# Patient Record
Sex: FEMALE | Race: WHITE
[De-identification: names, ages, dates, MRNs, and addresses within clinical notes are randomized per-mention and may not be internally consistent; named-entity substitution may affect disease eponyms.]

---

## 2018-12-19 ENCOUNTER — HOSPITAL ENCOUNTER (EMERGENCY)
Dept: HOSPITAL 97 - ER | Age: 48
Discharge: HOME | End: 2018-12-19
Payer: COMMERCIAL

## 2018-12-19 DIAGNOSIS — I10: ICD-10-CM

## 2018-12-19 DIAGNOSIS — J18.9: Primary | ICD-10-CM

## 2018-12-19 PROCEDURE — 99283 EMERGENCY DEPT VISIT LOW MDM: CPT

## 2018-12-19 PROCEDURE — 87081 CULTURE SCREEN ONLY: CPT

## 2018-12-19 PROCEDURE — 87804 INFLUENZA ASSAY W/OPTIC: CPT

## 2018-12-19 PROCEDURE — 71046 X-RAY EXAM CHEST 2 VIEWS: CPT

## 2018-12-19 PROCEDURE — 87070 CULTURE OTHR SPECIMN AEROBIC: CPT

## 2018-12-19 NOTE — ER
Nurse's Notes                                                                                     

 [facility]                                                                

Name: Maricel Varner                                                                              

Age: 48 yrs                                                                                       

Sex: Female                                                                                       

: 1970                                                                                   

MRN: [id number]                                                                                   

Arrival Date: 2018                                                                          

Time: 18:18                                                                                       

Account#: [id number]                                                                            

Bed DIS1                                                                                          

Private MD: MISTI ROONEY                                                                          

Diagnosis: Pneumonia                                                                              

                                                                                                  

Presentation:                                                                                     

                                                                                             

18:38 Presenting complaint: Patient states: Cough, sore throat, sore ribs, chills and body    ph  

      aches x 2-3 days, denies N/V/D. Transition of care: patient was not received from           

      another setting of care. Onset of symptoms was 2018. Risk Assessment: Do       

      you want to hurt yourself or someone else? Patient reports no desire to harm self or        

      others. Initial Sepsis Screen: Does the patient meet any 2 criteria? No. Patient's          

      initial sepsis screen is negative. Does the patient have a suspected source of              

      infection? No. Patient's initial sepsis screen is negative. Care prior to arrival: None.    

18:38 Method Of Arrival: Ambulatory                                                           ph  

18:38 Acuity: NAY 4                                                                           ph  

                                                                                                  

Historical:                                                                                       

- Allergies:                                                                                      

18:43 No Known Allergies;                                                                     ph  

- PMHx:                                                                                           

18:43 Hepatitis; Hypertension;                                                                ph  

- PSHx:                                                                                           

18:43 Tonsillectomy; ;                                                               ph  

                                                                                                  

- Immunization history:: Adult Immunizations up to date.                                          

- Social history:: Smoking status: Patient/guardian denies using tobacco.                         

- Ebola Screening: : No symptoms or risks identified at this time.                                

                                                                                                  

                                                                                                  

Screenin:56 Abuse screen: Denies threats or abuse. Nutritional screening: No deficits noted.        la1 

      Tuberculosis screening: No symptoms or risk factors identified. Fall Risk None              

      identified.                                                                                 

                                                                                                  

Assessment:                                                                                       

18:55 General: Appears in no apparent distress. Behavior is calm, cooperative. Pain: Denies   la1 

      pain. Neuro: Level of Consciousness is awake, alert, obeys commands, Oriented to            

      person, place, time, situation. Cardiovascular: Capillary refill < 3 seconds Patient's      

      skin is warm and dry. Respiratory: Airway is patent Trachea midline Respiratory effort      

      is even, unlabored, Respiratory pattern is regular, symmetrical, Breath sounds are          

      clear bilaterally. Respiratory: Reports cough that is non-productive, persistent. GI:       

      No signs and/or symptoms were reported involving the gastrointestinal system. : No        

      signs and/or symptoms were reported regarding the genitourinary system.                     

                                                                                                  

Vital Signs:                                                                                      

18:41  / 95; Pulse 94; Resp 18; Temp 98.9(O); Pulse Ox 98% on R/A; Weight 90.72 kg;     ph  

      Height 5 ft. 7 in. (170.18 cm); Pain 10/10;                                                 

18:41 Body Mass Index 31.32 (90.72 kg, 170.18 cm)                                             ph  

                                                                                                  

ED Course:                                                                                        

18:18 Patient arrived in ED.                                                                  sb2 

18:19 MISTI ROONEY is Private Physician.                                                      sb2 

18:23 Jean-Pierre Soares PA is PHCP.                                                              jmm 

18:23 José Brar MD is Attending Physician.                                              m 

18:33 Ronald Pandya, RN is Primary Nurse.                                                       la1 

18:41 Triage completed.                                                                       ph  

18:43 Arm band placed on.                                                                     ph  

18:56 Call light in reach.                                                                    la1 

18:56 No provider procedures requiring assistance completed. Patient did not have IV access   la1 

      during this emergency room visit.                                                           

19:08 Chest Pa And Lat (2 Views) XRAY In Process Unspecified.                                 EDMS

19:43 MISTI ROONEY is Referral Physician.                                                     [facility] 

                                                                                                  

Administered Medications:                                                                         

No medications were administered                                                                  

                                                                                                  

                                                                                                  

Outcome:                                                                                          

19:43 Discharge ordered by MD.                                                                m 

19:57 Discharged to home ambulatory.                                                          la1 

19:57 Condition: stable                                                                           

19:57 Discharge instructions given to patient, Instructed on discharge instructions, follow       

      up and referral plans. medication usage, Demonstrated understanding of instructions,        

      follow-up care, medications, Prescriptions given X 1.                                       

19:57 Patient left the ED.                                                                    la1 

                                                                                                  

Signatures:                                                                                       

Dispatcher [employer]                           EDMS                                                 

Jean-Pierre Soares PA PA   Ronald Yang, RN                         RN   la1                                                  

Katlin Mitchell RN                      RN                                                      

Larissa Whitley                               sb2                                                  

                                                                                                  

**************************************************************************************************

## 2018-12-19 NOTE — EDPHYS
Physician Documentation                                                                           

 [facility]                                                                

Name: Maricel Varner                                                                              

Age: 48 yrs                                                                                       

Sex: Female                                                                                       

: 1970                                                                                   

MRN: [id number]                                                                                   

Arrival Date: 2018                                                                          

Time: 18:18                                                                                       

Account#: [id number]                                                                            

Bed DIS1                                                                                          

Private MD: MISTI ROONEY                                                                          

ED Physician José Brar                                                                       

HPI:                                                                                              

                                                                                             

18:47 This 48 yrs old  Female presents to ER via Ambulatory with complaints of Flu   jmm 

      Symptoms.                                                                                   

18:47 The patient or guardian reports cough. Onset: The symptoms/episode began/occurred       jmm 

      gradually, 2 day(s) ago. Modifying factors: The symptoms are alleviated by nothing. the     

      symptoms are aggravated by nothing. Associated signs and symptoms: Pertinent positives:     

      sore throat, Pertinent negatives:. This is a 48 year old female with a history of HTN       

      that presents to the ED with cough, congestion, sore throat beginning this past Monday.     

      Family members have similar symptoms. .                                                     

                                                                                                  

Historical:                                                                                       

- Allergies:                                                                                      

18:43 No Known Allergies;                                                                     ph  

- PMHx:                                                                                           

18:43 Hepatitis; Hypertension;                                                                ph  

- PSHx:                                                                                           

18:43 Tonsillectomy; ;                                                               ph  

                                                                                                  

- Immunization history:: Adult Immunizations up to date.                                          

- Social history:: Smoking status: Patient/guardian denies using tobacco.                         

- Ebola Screening: : No symptoms or risks identified at this time.                                

                                                                                                  

                                                                                                  

ROS:                                                                                              

18:47 Eyes: Negative for injury, pain, redness, and discharge.                                jmm 

18:47 Neck: Negative for injury, pain, and swelling, Cardiovascular: Negative for chest pain,     

      palpitations, and edema.                                                                    

18:47 Constitutional: Positive for chills.                                                        

18:47 ENT: Positive for sore throat.                                                              

18:47 Respiratory: Positive for cough.                                                            

18:47 All other systems are negative.                                                             

                                                                                                  

Exam:                                                                                             

18:47 Constitutional:  This is a well developed, well nourished patient who is awake, alert,  jmm 

      and in no acute distress. Head/Face:  atraumatic. Eyes:  EOMI, no conjunctival erythema     

      appreciated ENT:  Moist Mucus Membranes Neck:  Trachea midline, Supple Chest/axilla:        

      Normal chest wall appearance and motion.                                                    

18:47 Cardiovascular: Rate: normal, Rhythm: regular.                                              

18:47 Respiratory: the patient does not display signs of respiratory distress,  Respirations:     

      normal, Breath sounds: are clear throughout.                                                

18:47 Abdomen/GI: Inspection: abdomen appears normal, Bowel sounds: normal, Palpation: soft,      

      nontender, in all quadrants.                                                                

18:47 Back: ROM is normal.                                                                        

18:47 Musculoskeletal/extremity: ROM: intact in all extremities.                                  

18:47 Skin: Appearance: Color: normal in color.                                                   

18:47 Neuro: Orientation: is normal, Mentation: is normal, Memory: is normal.                     

18:47 Psych: Behavior/mood is pleasant, cooperative.                                              

                                                                                                  

Vital Signs:                                                                                      

18:41  / 95; Pulse 94; Resp 18; Temp 98.9(O); Pulse Ox 98% on R/A; Weight 90.72 kg;     ph  

      Height 5 ft. 7 in. (170.18 cm); Pain 10/10;                                                 

18:41 Body Mass Index 31.32 (90.72 kg, 170.18 cm)                                             ph  

                                                                                                  

MDM:                                                                                              

18:43 Patient medically screened.                                                             [facility] 

19:42 Data reviewed: vital signs, nurses notes. Counseling: I had a detailed discussion with  [facility] 

      the patient and/or guardian regarding: the historical points, exam findings, and any        

      diagnostic results supporting the discharge/admit diagnosis, lab results, radiology         

      results, the need for outpatient follow up, to return to the emergency department if        

      symptoms worsen or persist or if there are any questions or concerns that arise at          

      home. ED course: Patient is alert and non toxic in appearance in the ED. Patient            

      advised to follow up with PCP in 1 to 2 days for reevaluation. Patient has no signs of      

      resp distress. Patient given strict return precautions. .                                   

                                                                                                  

                                                                                             

18:44 Order name: Influenza Screen (a \T\ B); Complete Time: 19:28                              [facility]

                                                                                             

18:44 Order name: Strep; Complete Time: 19:28                                                 [facility] 

                                                                                             

18:44 Order name: Chest Pa And Lat (2 Views) XRAY; Complete Time: 19:21                       [facility] 

                                                                                             

19:25 Order name: Throat Culture                                                              EDMS

                                                                                                  

Administered Medications:                                                                         

No medications were administered                                                                  

                                                                                                  

                                                                                                  

Disposition:                                                                                      

                                                                                             

07:40 Co-signature as Attending Physician, José Brar MD I agree with the assessment and   kdr 

      plan of care.                                                                               

                                                                                                  

Disposition:                                                                                      

18 19:43 Discharged to Home. Impression: Pneumonia.                                         

- Condition is Stable.                                                                            

- Discharge Instructions: Community-Acquired Pneumonia, Adult.                                    

- Prescriptions for Levaquin 750 mg Oral Tablet - take 1 tablet by ORAL route once                

  daily for 10 days; 10 tablet.                                                                   

- Medication Reconciliation Form, Thank You Letter, Antibiotic Education, Prescription            

  Opioid Use form.                                                                                

- Follow up: MISTI ROONEY; When: 1 - 2 days; Reason: Recheck today's complaints,                  

  Continuance of care, Re-evaluation by your physician.                                           

                                                                                                  

                                                                                                  

                                                                                                  

Signatures:                                                                                       

Dispatcher [employer]                           EDMS                                                 

José Brar MD MD kdr Mickail, Joel, PA PA jmm Attema, Lee, RN                         RN   la1                                                  

Katlin Mitchell RN                      RN   ph                                                   

                                                                                                  

Corrections: (The following items were deleted from the chart)                                    

                                                                                             

19:57 19:43 2018 19:43 Discharged to Home. Impression: Pneumonia. Condition is Stable.  la1 

      Forms are Medication Reconciliation Form, Thank You Letter, Antibiotic Education,           

      Prescription Opioid Use. Follow up: MISTI ROONEY; When: 1 - 2 days; Reason: Recheck          

      today's complaints, Continuance of care, Re-evaluation by your physician. rafi               

                                                                                                  

**************************************************************************************************

## 2018-12-19 NOTE — RAD REPORT
EXAM DESCRIPTION:  RAD - Chest Pa And Lat (2 Views) - 12/19/2018 7:08 pm

 

CLINICAL HISTORY:  cough, fever

Chest pain.

 

COMPARISON:  No comparisons

 

FINDINGS:  Mild linear opacities are seen in the lingula, suspicious for developing pneumonia. The tito
ngs are otherwise clear. The heart is normal in size. No displaced fractures.

 

IMPRESSION:  Early developing lingular pneumonia is suspected.

## 2018-12-21 NOTE — XMS REPORT
Patient Summary Document

 Created on:2018



Patient:WARREN PAGE

Sex:Female

:1970

External Reference #:[id number]





Demographics







 Address  [address] [place] [address]

 

 Home Phone  [phone]

 

 Work Phone  [phone] CE[employer]

 

 Email Address  [email or web address]

 

 Preferred Language  Unknown

 

 Marital Status  Unknown

 

 [employer] Affiliation  Unknown

 

 Race  Unknown

 

 Additional Race(s)  Unavailable

 

 Ethnic Group  Unknown









Author







 Organization  [facility]

 

 Address  [address]



   [place] [address]

 

 Phone  [phone]









Care Team Providers







 Name  Role  Phone

 

 DR QUINTIN RUIZ  Unavailable  Unavailable









Problems

This patient has no known problems.



Allergies, Adverse Reactions, Alerts

This patient has no known allergies or adverse reactions.



Medications

This patient has no known medications.



Encounters







 Start  End  Encounter  Admission  Attending  Care  Care  Encounter



 Date/Time  Date/Time  Type  Type  Clinicians  Facility  Department  ID

 

 2018  Outpatient  FAIZAN RUIZ  [facility]  [id number]



 03:56:00  05:45:00      QUINTIN      

 

 2018  Outpatient  FAIZAN RUIZ  [facility]  [id number]



 04:10:00  07:30:00      QUINTIN      

 

 2018  Outpatient  FAIZAN RUIZ  [facility]  [id number]



 04:29:00  06:32:00      QUINTIN      







Results







 Test Description  Test Time  Test Comments  Text Results  Atomic Results  
Result Comments









 PREGNANCY URINE MONOCLONAL**FB**  2018 04:58:00    









   Test Item  Value  Reference Range  Comments









 PREG UR (test code=PGU)  NEGATIVE  NEGATIVE  



PREGNANCY URINE MONOCLONAL**FB**2018 05:47:00





 Test Item  Value  Reference Range  Comments

 

 PREG UR (test code=PGU)  NEGATIVE  NEGATIVE  



PREGNANCY URINE MONOCLONAL**FB**2018 04:51:00





 Test Item  Value  Reference Range  Comments

 

 PREG UR (test code=PGU)  NEGATIVE  NEGATIVE  



PREGNANCY URINE MONOCLONAL**FB**2018 04:50:00





 Test Item  Value  Reference Range  Comments

 

 PREG UR (test code=PGU)  NEGATIVE  NEGATIVE

## 2019-09-22 ENCOUNTER — HOSPITAL ENCOUNTER (EMERGENCY)
Dept: HOSPITAL 97 - ER | Age: 49
Discharge: HOME | End: 2019-09-22
Payer: COMMERCIAL

## 2019-09-22 VITALS — TEMPERATURE: 98 F | DIASTOLIC BLOOD PRESSURE: 64 MMHG | OXYGEN SATURATION: 97 % | SYSTOLIC BLOOD PRESSURE: 131 MMHG

## 2019-09-22 DIAGNOSIS — H93.8X2: Primary | ICD-10-CM

## 2019-09-22 DIAGNOSIS — I10: ICD-10-CM

## 2019-09-22 PROCEDURE — 99283 EMERGENCY DEPT VISIT LOW MDM: CPT

## 2019-09-22 NOTE — XMS REPORT
Patient Summary Document

 Created on:2019



Patient:WARREN PAGE

Sex:Female

:1970

External Reference #:[id number]





Demographics







 Address  [address]



   [place] [address]

 

 Home Phone  [phone]

 

 Work Phone  [phone] CE[employer]

 

 Email Address  [email or web address]

 

 Preferred Language  Unknown

 

 Marital Status  Unknown

 

 [employer] Affiliation  Unknown

 

 Race  Unknown

 

 Additional Race(s)  Unavailable

 

 Ethnic Group  Unknown









Author







 Organization  [facility]

 

 Address  [address]



   [place] [address]

 

 Phone  [phone]









Support







 Name  Relationship  Address  Phone

 

 SONIA PAGE  Unavailable  [address]  [phone]



     [place] [address]  

 

 SONIA PAGE  Unavailable  [address]  [phone]



     [place] [address]  









Care Team Providers







 Name  Role  Phone

 

 DR QUINTIN RUIZ  Unavailable  Unavailable









Payers







 Payer Name  Policy Type  Policy Number  Effective Date  Expiration Date







Problems

This patient has no known problems.



Allergies, Adverse Reactions, Alerts







 Allergy  Allergy  Status  Severity  Reaction(s)  Onset  Inactive  Treating  
Comments



 Name  Type        Date  Date  Clinician  

 

 No Known  DA  Active  U    2019      



 Allergies          -15      



           00:00:0      



           0      







Medications

This patient has no known medications.



Encounters







 Start  End  Encounter  Admission  Attending  Care  Care  Encounter



 Date/Time  Date/Time  Type  Type  Clinicians  Facility  Department  ID

 

 2018  Outpatient  FAIZAN RUIZ  [facility]  [id number]



 03:56:00  05:45:00      QUINTIN      

 

 2018  Outpatient  FAIZAN RUIZ  [facility]  [id number]



 04:10:00  07:30:00      QUINTIN      

 

 2018  Outpatient  FAIZAN RUIZ  [facility]  [id number]



 04:29:00  06:32:00      QUINTIN      







Results







 Test Description  Test Time  Test Comments  Text Results  Atomic Results  
Result Comments









 PREGNANCY URINE MONOCLONAL**FB**  2018 04:58:00    









   Test Item  Value  Reference Range  Comments









 PREG UR (test code=PGU)  NEGATIVE  NEGATIVE  



PREGNANCY URINE MONOCLONAL**FB**2018 05:47:00





 Test Item  Value  Reference Range  Comments

 

 PREG UR (test code=PGU)  NEGATIVE  NEGATIVE  



PREGNANCY URINE MONOCLONAL**FB**2018 04:51:00





 Test Item  Value  Reference Range  Comments

 

 PREG UR (test code=PGU)  NEGATIVE  NEGATIVE  



PREGNANCY URINE MONOCLONAL**FB**2018 04:50:00





 Test Item  Value  Reference Range  Comments

 

 PREG UR (test code=PGU)  NEGATIVE  NEGATIVE

## 2019-09-22 NOTE — EDPHYS
Physician Documentation                                                                           

 [facility]                                                                 

Name: Maricel Varner                                                                              

Age: 48 yrs                                                                                       

Sex: Female                                                                                       

: 1970                                                                                   

MRN: [id number]                                                                                   

Arrival Date: 2019                                                                          

Time: 05:08                                                                                       

Account#: [id number]                                                                            

Bed 7                                                                                             

Private MD:                                                                                       

ED Physician Terrell Wong                                                                      

HPI:                                                                                              

                                                                                             

07:50 This 48 yrs old  Female presents to ER via Ambulatory with complaints of Bug   wa  

      In Ear.                                                                                     

07:50 This 48 yrs old  Female presents to ER via Ambulatory with complaints of Bug   wa  

      In Ear.                                                                                     

07:50 The patient presents with a foreign body sensation. The complaints affect the left ear. wa  

      Onset: The symptoms/episode began/occurred just prior to arrival. Modifying factors:        

      The symptoms are alleviated by nothing, the symptoms are aggravated by nothing.             

      Associated signs and symptoms: The patient has no apparent associated signs or              

      symptoms. Severity of symptoms: At their worst the symptoms were moderate in the            

      emergency department the symptoms have resolved. The patient has not experienced            

      similar symptoms in the past. The patient has not recently seen a physician. states         

      awoke with FB sensation in L ear. they put peroxide in the ear and patted the side of       

      the left side of head several times in route to ED. states has since improved.              

                                                                                                  

OB/GYN:                                                                                           

05:24 LMP 2019                                                                            ak1 

                                                                                                  

Historical:                                                                                       

- Allergies:                                                                                      

05:28 No Known Allergies;                                                                     ak1 

- Home Meds:                                                                                      

05:28 Buspirone Oral [Active]; Lisinopril Oral [Active];                                      ak1 

- PMHx:                                                                                           

05:28 Hepatitis; Hypertension;                                                                ak1 

- PSHx:                                                                                           

05:28 Tonsillectomy; ; hand sx; right brest abscess sx; foot sx;                     ak1 

                                                                                                  

- Immunization history:: Adult Immunizations unknown.                                             

- Social history:: Smoking status: Patient/guardian denies using tobacco.                         

- Ebola Screening: : No symptoms or risks identified at this time.                                

- Family history:: not pertinent.                                                                 

- Hospitalizations: : No recent hospitalization is reported.                                      

                                                                                                  

                                                                                                  

ROS:                                                                                              

07:52 Constitutional: Negative for fever, chills, and weight loss, Eyes: Negative for injury, wa  

      pain, redness, and discharge, Neck: Negative for injury, pain, and swelling,                

      Cardiovascular: Negative for chest pain, palpitations, and edema, Respiratory: Negative     

      for shortness of breath, cough, wheezing, and pleuritic chest pain, Abdomen/GI:             

      Negative for abdominal pain, nausea, vomiting, diarrhea, and constipation, Back:            

      Negative for injury and pain, : Negative for injury, bleeding, discharge, and             

      swelling, MS/Extremity: Negative for injury and deformity, Skin: Negative for injury,       

      rash, and discoloration, Neuro: Negative for headache, weakness, numbness, tingling,        

      and seizure, Psych: Negative for depression, anxiety, suicide ideation, homicidal           

      ideation, and hallucinations.                                                               

07:52 ENT: Positive for foreign body sensation, of the left ear.                                  

07:52 All other systems are negative.                                                             

                                                                                                  

Exam:                                                                                             

07:52 Constitutional:  This is a well developed, well nourished patient who is awake, alert,  wa  

      and in no acute distress. Head/Face:  Normocephalic, atraumatic. Eyes:  Pupils equal        

      round and reactive to light, extra-ocular motions intact.  Lids and lashes normal.          

      Conjunctiva and sclera are non-icteric and not injected.  Cornea within normal limits.      

      Periorbital areas with no swelling, redness, or edema. Neck:  Trachea midline, no           

      thyromegaly or masses palpated, and no cervical lymphadenopathy.  Supple, full range of     

      motion without nuchal rigidity, or vertebral point tenderness.  No Meningismus.             

      Chest/axilla:  Normal chest wall appearance and motion.  Nontender with no deformity.       

      No lesions are appreciated. Cardiovascular:  Regular rate and rhythm with a normal S1       

      and S2.  No gallops, murmurs, or rubs.  Normal PMI, no JVD.  No pulse deficits.             

      Respiratory:  Lungs have equal breath sounds bilaterally, clear to auscultation and         

      percussion.  No rales, rhonchi or wheezes noted.  No increased work of breathing, no        

      retractions or nasal flaring. Abdomen/GI:  Soft, non-tender, with normal bowel sounds.      

      No distension or tympany.  No guarding or rebound.  No evidence of tenderness               

      throughout. Back:  No spinal tenderness.  No costovertebral tenderness.  Full range of      

      motion. Skin:  Warm, dry with normal turgor.  Normal color with no rashes, no lesions,      

      and no evidence of cellulitis. MS/ Extremity:  Pulses equal, no cyanosis.                   

      Neurovascular intact.  Full, normal range of motion. Neuro:  Awake and alert, GCS 15,       

      oriented to person, place, time, and situation.  Cranial nerves II-XII grossly intact.      

      Motor strength 5/5 in all extremities.  Sensory grossly intact.  Cerebellar exam            

      normal.  Normal gait. Psych:  Awake, alert, with orientation to person, place and time.     

       Behavior, mood, and affect are within normal limits.                                       

07:52 ENT: External ear(s): are unremarkable, Ear canal(s): foreign body, is not appreciated,     

      TM's: mildly dull on L side compared to Right.                                              

                                                                                                  

Vital Signs:                                                                                      

05:24  / 64; Pulse 98; Resp 18; Temp 98.0; Pulse Ox 97% on R/A; Weight 90.72 kg (R);    ak1 

      Height 5 ft. 7 in. (170.18 cm) (R); Pain 0/10;                                              

05:24 Body Mass Index 31.32 (90.72 kg, 170.18 cm)                                             ak1 

                                                                                                  

MDM:                                                                                              

05:22 Patient medically screened.                                                             wa  

07:54 Differential diagnosis: otitis media, otitis externa, foreign body, acute otalgia,      wa  

      cerumen impaction. Data reviewed: vital signs, nurses notes. ED course: suspect FB came     

      out prior to presentation. will cover with Cortisporin otic and advise close f/u.           

                                                                                                  

Administered Medications:                                                                         

No medications were administered                                                                  

                                                                                                  

                                                                                                  

Disposition:                                                                                      

19 05:37 Discharged to Home. Impression: acute left ear foreign body sensation and          

  discomfort.                                                                                     

- Condition is Stable.                                                                            

- Discharge Instructions: Ear Foreign Body, Easy-to-Read.                                         

- Prescriptions for Cortisporin- TC 3.3-3-10-0.5 mg/mL Otic Drops, Suspension - instill           

  4 drops by OTIC route every 6 hours for 4 days; 1 bottle.                                       

- Medication Reconciliation Form, Thank You Letter, Antibiotic Education, Prescription            

  Opioid Use form.                                                                                

- Follow up: Private Physician; When: 2 - 3 days; Reason: Recheck today's complaints.             

- Problem is new.                                                                                 

- Symptoms have improved.                                                                         

- Notes: no bug was noted in your ear canal on exam. see [facility] doctor within 2-3 days if           

  sensation persists                                                                              

                                                                                                  

                                                                                                  

Signatures:                                                                                       

Mimi Rowley, RN                       RN   ak1                                                  

Terrell Wong MD MD   wa                                                   

                                                                                                  

Corrections: (The following items were deleted from the chart)                                    

05:48 05:37 2019 05:37 Discharged to Home. Impression: acute left ear foreign body      ak1 

      sensation and discomfort. Condition is Stable. Forms are Medication Reconciliation          

      Form, Thank You Letter, Antibiotic Education, Prescription Opioid Use. Follow up:           

      Private Physician; When: 2 - 3 days; Reason: Recheck today's complaints. Problem is         

      new. Symptoms have improved. wa                                                             

                                                                                                  

**************************************************************************************************

## 2019-09-22 NOTE — ER
Nurse's Notes                                                                                     

 [facility]                                                                 

Name: Maricel Varner                                                                              

Age: 48 yrs                                                                                       

Sex: Female                                                                                       

: 1970                                                                                   

MRN: [id number]                                                                                   

Arrival Date: 2019                                                                          

Time: 05:08                                                                                       

Account#: [id number]                                                                            

Bed 7                                                                                             

Private MD:                                                                                       

Diagnosis: acute left ear foreign body sensation and discomfort                                   

                                                                                                  

Presentation:                                                                                     

                                                                                             

05:25 Presenting complaint: Patient states: at 0400 she felt something in her left ear.       ak1 

      Transition of care: patient was not received from another setting of care. Onset of         

      symptoms was 2019 at 04:00. Risk Assessment: Do you want to hurt yourself     

      or someone else? Patient reports no desire to harm self or others. Initial Sepsis           

      Screen: Does the patient meet any 2 criteria? No. Patient's initial sepsis screen is        

      negative. Does the patient have a suspected source of infection? No. Patient's initial      

      sepsis screen is negative. Care prior to arrival: None.                                     

05:25 Method Of Arrival: Ambulatory                                                           ak1 

05:25 Acuity: NAY 4                                                                           ak1 

                                                                                                  

Triage Assessment:                                                                                

05:28 General: Appears in no apparent distress. Behavior is calm, cooperative, appropriate    ak1 

      for age. Pain: Denies pain. EENT: Reports insect in her left ear. Neuro: Level of           

      Consciousness is awake, alert, obeys commands, Oriented to person, place, time, Moves       

      all extremities. Speech is normal. Cardiovascular: No deficits noted. Respiratory: No       

      deficits noted. GI: No signs and/or symptoms were reported involving the                    

      gastrointestinal system. : No signs and/or symptoms were reported regarding the           

      genitourinary system. Derm: No signs and/or symptoms reported regarding the                 

      dermatologic system. Musculoskeletal: No signs and/or symptoms reported regarding the       

      musculoskeletal system.                                                                     

                                                                                                  

OB/GYN:                                                                                           

05:24 LMP 2019                                                                            ak1 

                                                                                                  

Historical:                                                                                       

- Allergies:                                                                                      

05:28 No Known Allergies;                                                                     ak1 

- Home Meds:                                                                                      

05:28 Buspirone Oral [Active]; Lisinopril Oral [Active];                                      ak1 

- PMHx:                                                                                           

05:28 Hepatitis; Hypertension;                                                                ak1 

- PSHx:                                                                                           

05:28 Tonsillectomy; ; hand sx; right brest abscess sx; foot sx;                     ak1 

                                                                                                  

- Immunization history:: Adult Immunizations unknown.                                             

- Social history:: Smoking status: Patient/guardian denies using tobacco.                         

- Ebola Screening: : No symptoms or risks identified at this time.                                

- Family history:: not pertinent.                                                                 

- Hospitalizations: : No recent hospitalization is reported.                                      

                                                                                                  

                                                                                                  

Screenin:29 Abuse screen: Denies threats or abuse. Denies injuries from another. Nutritional        ak1 

      screening: No deficits noted. Tuberculosis screening: No symptoms or risk factors           

      identified. Fall Risk None identified.                                                      

                                                                                                  

Assessment:                                                                                       

05:29 Reassessment: Patient appears in no apparent distress at this time. No changes from     ak1 

      previously documented assessment. see triage assessment.                                    

                                                                                                  

Vital Signs:                                                                                      

05:24  / 64; Pulse 98; Resp 18; Temp 98.0; Pulse Ox 97% on R/A; Weight 90.72 kg (R);    ak1 

      Height 5 ft. 7 in. (170.18 cm) (R); Pain 0/10;                                              

05:24 Body Mass Index 31.32 (90.72 kg, 170.18 cm)                                             ak1 

                                                                                                  

ED Course:                                                                                        

05:08 Patient arrived in ED.                                                                  1 

05:22 Terrell Wong MD is Attending Physician.                                             wa  

05:24 Mimi Rowley RN is Primary Nurse.                                                     ak1 

05:24 Arm band placed on Patient placed in an exam room, on a stretcher, on pulse oximetry,   ak1 

      Patient notified of wait time.                                                              

05:26 Triage completed.                                                                       ak1 

05:29 Patient has correct armband on for positive identification. Bed in low position. Call   ak1 

      light in reach. Side rails up X 1. Adult w/ patient. Pulse ox on. NIBP on.                  

05:47 No provider procedures requiring assistance completed. Patient did not have IV access   ak1 

      during this emergency room visit.                                                           

                                                                                                  

Administered Medications:                                                                         

No medications were administered                                                                  

                                                                                                  

                                                                                                  

Outcome:                                                                                          

05:37 Discharge ordered by MD.                                                                wa  

05:47 Discharged to home ambulatory, with family.                                             ak1 

05:47 Condition: good                                                                             

05:47 Discharge instructions given to patient, Instructed on discharge instructions, follow       

      up and referral plans. Demonstrated understanding of instructions, follow-up care,          

      medications, Prescriptions given X 1.                                                       

05:48 Patient left the ED.                                                                    ak1 

                                                                                                  

Signatures:                                                                                       

Mary Jo Schroeder                                ds1                                                  

Mimi Rowley RN                       RN   ak1                                                  

Terrell Wong MD MD wa                                                   

                                                                                                  

**************************************************************************************************

## 2019-09-22 NOTE — XMS REPORT
[facility]

 Created on:2019



Patient:Maricel Varner

Sex:Female

:1970

External Reference #:[id number]





Demographics







 Address  [address] [place] [address]



   [place] [address]

 

 Phone  [phone]

 

 Preferred Language  en

 

 Marital Status  Unknown

 

 Tenriism Affiliation  Unknown

 

 Race  White

 

 Ethnic Group  Unknown









Author







 Organization  [employer]









Care Team Providers







 Name  Role  Phone

 

 Lexie Pan  Provider Role  Unavailable









Allergies, Adverse Reactions, Alerts







 Substance  Reaction  Event Type

 

 N.K.D.A.  Info Not Available  Non Drug Allergy







Problems







 Problem Type  Condition  Code  Onset Dates  Condition Status

 

 Problem  Muscle cramps  R25.2    Active

 

 Problem  Hepatitis B carrier  B18.1    Active

 

 Problem  Hypokalemia  E87.6    Active

 

 Assessment  Major depressive disorder with single  F32.4    Active



   episode, in partial remission      

 

 Problem  Major depressive disorder with single  F32.4    Active



   episode, in partial remission      

 

 Problem  Hypertension, unspecified type  I10    Active







Medications







 Medication  Code  Code  Instructions  Start  End  Status  Dosage



   System      Date  Date    

 

 Zestoretic  NDC  52270604062  20-25 MG Orally      Active  take 1 tablet



       Once a day        every day

 

 Xerese  NDC  84501330069  5-1 %      Active  1 application



       Externally Five        to affected



       times a day        area

 

 Trintellix  NDC  [id number]  10 MG Orally  April    Active  1 tablet



       Once a day  2019      

 

 Sertraline  NDC  [id number]  50 MG Orally      Active  take 1 tablet



 HCl      Once a day        every day







Results

No Known Results



Summary Purpose

[employer] Submission

## 2019-09-22 NOTE — XMS REPORT
[facility]

 Created on:2019



Patient:Maricel Varner

Sex:Female

:1970

External Reference #:[id number]





Demographics







 Address  [address] [place] [address]

 

 Phone  [phone]

 

 Preferred Language  en

 

 Marital Status  Unknown

 

 Sikhism Affiliation  Unknown

 

 Race  White

 

 Ethnic Group  Unknown









Author







 Organization  [employer]









Care Team Providers







 Name  Role  Phone

 

 Lexie Pan  Provider Role  Unavailable









Allergies

No Known Allergies



Problems







 Problem Type  Condition  Code  Onset Dates  Condition Status

 

 Problem  Muscle cramps  R25.2    Active

 

 Problem  Hepatitis B carrier  B18.1    Active

 

 Problem  Hypokalemia  E87.6    Active

 

 Problem  Major depressive disorder with single  F32.4    Active



   episode, in partial remission      

 

 Problem  Hypertension, unspecified type  I10    Active







Medications







 Medication  Code System  Code  Instructions  Start  End Date  Status  Dosage



         Date      

 

 Trintellix  NDC  [id number]  10 MG Orally Once  ,    Active  1 tablet



       a day        

 

 Zestoretic  NDC  [id number]  20-25 MG Orally      Active  take 1



       Once a day        tablet



               every day







Results

No Known Results



Summary Purpose

[employer] Submission

## 2020-10-13 ENCOUNTER — HOSPITAL ENCOUNTER (OUTPATIENT)
Dept: HOSPITAL 97 - OR | Age: 50
Discharge: HOME | End: 2020-10-13
Attending: OBSTETRICS & GYNECOLOGY
Payer: COMMERCIAL

## 2020-10-13 VITALS — SYSTOLIC BLOOD PRESSURE: 127 MMHG | DIASTOLIC BLOOD PRESSURE: 67 MMHG | OXYGEN SATURATION: 99 % | TEMPERATURE: 97.4 F

## 2020-10-13 DIAGNOSIS — D25.2: ICD-10-CM

## 2020-10-13 DIAGNOSIS — C54.1: ICD-10-CM

## 2020-10-13 DIAGNOSIS — C53.0: Primary | ICD-10-CM

## 2020-10-13 DIAGNOSIS — D25.0: ICD-10-CM

## 2020-10-13 DIAGNOSIS — E05.90: ICD-10-CM

## 2020-10-13 DIAGNOSIS — I10: ICD-10-CM

## 2020-10-13 DIAGNOSIS — Z20.828: ICD-10-CM

## 2020-10-13 PROCEDURE — 88305 TISSUE EXAM BY PATHOLOGIST: CPT

## 2020-10-13 PROCEDURE — 0UBC8ZX EXCISION OF CERVIX, VIA NATURAL OR ARTIFICIAL OPENING ENDOSCOPIC, DIAGNOSTIC: ICD-10-PCS

## 2020-10-13 PROCEDURE — 0UDB8ZX EXTRACTION OF ENDOMETRIUM, VIA NATURAL OR ARTIFICIAL OPENING ENDOSCOPIC, DIAGNOSTIC: ICD-10-PCS

## 2020-10-13 PROCEDURE — 81025 URINE PREGNANCY TEST: CPT

## 2020-10-13 PROCEDURE — 58558 HYSTEROSCOPY BIOPSY: CPT

## 2020-10-13 NOTE — XMS REPORT
[facility]

                           Created on:2020



Patient:Maricel Varner

Sex:Female

:1970

External Reference #:[id number]





Demographics







                          Address                   [address] [place] [address]

 

                          Phone                     [phone]

 

                          Preferred Language        en

 

                          Marital Status            Unknown

 

                          Rastafarian Affiliation     Unknown

 

                          Race                      White

 

                          Ethnic Group              Unknown









Author







                          Organization              [employer]









Care Team Providers







                    Name                Role                Phone

 

                    Talia Pan        Provider Role       Unavailable









Allergies, Adverse Reactions, Alerts







                    Substance           Reaction            Event Type

 

                    N.K.D.A.            Info Not Available  Non Drug Allergy







Problems







             Problem Type Condition    Code         Onset Dates  Condition Statu

s

 

             Assessment   Anxiety      F41.9                     Active

 

             Assessment   Muscle cramps R25.2                     Active

 

             Problem      Muscle cramps R25.2                     Active

 

             Problem      Major depressive disorder with single F32.4           

          Active



                          episode, in partial remission                         

  

 

             Problem      Anxiety      F41.9                     Active

 

             Assessment   Hypertension, unspecified type I10                    

   Active

 

             Assessment   Major depressive disorder with single F32.4           

          Active



                          episode, in partial remission                         

  

 

             Problem      Hypertension, unspecified type I10                    

   Active







Medications







        Medication Code    Code    Instructions Start   End Date Status  Dosage



                System                  Date                    

 

        Trintellix NDC     [id number] 10 MG                   Active  TAKE 1



                                                                TABLET BY



                                                                MOUTH



                                                                EVERY DAY

 

        Zestoretic NDC     [id number] 20-25 MG Orally                 Active  t

guido 1



                                Once a day                         tablet



                                                                every day

 

        BusPIRone HCl NDC     [id number] 7.5 MG Orally                 Active  

1 tablet



                                Three times a                         



                                day                             







Results

No Known Results



Summary Purpose

[employer] Submission

## 2020-10-13 NOTE — XMS REPORT
Continuity of Care Document

                           Created on:2020



Patient:WARREN PAGE

Sex:Female

:1970

External Reference #:[id number]





Demographics







                          Address                   [id number] [place] [address] [address]



                                                    [place] [id number]

 

                          Home Phone                [phone]

 

                          Work Phone                [phone] CE[facility]

 

                          Email Address             [email or web address]

 

                          Preferred Language        en

 

                          Marital Status            Unknown

 

                          Worship Affiliation     Unknown

 

                          Race                      Unknown

 

                          Additional Race(s)        Unavailable



                                                    White

 

                          Ethnic Group              Unknown









Author







                          Organization              [facility]

t

 

                          Address                   [address]



                                                    [place] [address]

 

                          Phone                     [phone]









Support







                Name            Relationship    Address         Phone

 

                KAYLEE          Unavailable     [id number] [facility]  [phone]



                                                [place] [address] 

 

                KAYLEE          Unavailable     [id number] [facility]  [phone]



                                                [place] [address] 









Care Team Providers







                    Name                Role                Phone

 

                    DR SARA           Attending Clinician Unavailable

 

                    DR SARA           Admitting Clinician Unavailable









Payers







           Payer Name Policy Type Policy Number Effective Date Expiration Date S

ource







Problems







       Condition Condition Condition Status Onset  Resolution Last   Treating Co

mments 

Source



       Name   Details Category        Date   Date   Treatment Clinician        



                                                 Date                 

 

       Muscle Muscle Problem Active                                    [facility]



       cramps cramps                                                  [facility]



                                                                      [facility]



                                                                      l



                                                                      Outpati



                                                                      ent



                                                                      Clinics

 

       Major  Major  Diagnosis Active                                    [facility]



       depressive depressive                                                  [facility]

[facility] -



       disorder disorder                                                  [facility]

a



       with   with                                                    l



       single single                                                  Outpati



       episode, episode,                                                  ent



       in partial in partial                                                  Cl

inics



       remission remission                                                  

 

       Hypertensi Hypertensi Problem Active                                    [facility]

[facility]



       on,    on,                                                     [facility]



       unspecifie unspecifie                                                  Me

moria



       d type d type                                                  l



                                                                      Outpati



                                                                      ent



                                                                      Clinics

 

       Anxiety Anxiety Problem Active                                    [facility]



                                                                      [facility]



                                                                      [facility]







Allergies, Adverse Reactions, Alerts







       Allergy Allergy Status Severity Reaction(s) Onset  Inactive Treating Comm

ents 

Source



       Name   Type                        Date   Date   Clinician        

 

       No Known DA     Active U                                   [facility]



       Allergie                             2-15                        [place]



       s                                  00:00:                      [facility]



                                          00                          [facility]ic



                                                                      [facility]



                                                                      l







Medications







       Ordered Filled Start  Stop   Current Ordering Indication Dosage Frequency

 Signature

                    Comments            Components          Source



     Medication Medication Date Date Medication? Clinician                (SIG) 

          



     Name Name                                                   

 

     Trintellix Trintellix       Yes  Talia                TAKE 1         

  [facility]



               4-05           [place]                TABLET BY           [facility]



               00:00:                               MOUTH           [facility]



               00                                 EVERY DAY           l



                                                                 Outpati



                                                                 ent



                                                                 Clinics

 

     Zestoretic Zestoretic           Yes  Talia                take 1           

[facility]



                              [place]                tablet           [facility]



                                                  [facility]           [facility]



                                                                 [facility]



                                                                 ent



                                                                 Clinics

 

     BusPIRone BusPIRone           Yes  Talia                1 tablet           

[facility]



     HCl  HCl                 [place]                               [facility]



                                                                 [facility]



                                                                 l



                                                                 Outpati



                                                                 ent



                                                                 Clinics







Procedures

This patient has no known procedures.



Encounters







        Start   End     Encounter Admission Attending Care    Care    Encounter 

Source



        Date/Time Date/Time Type    Type    Clinicians Facility Department ID   

   

 

        2020 Outpatient                 [facility]

[id number] [facility]



        08:20:00 08:20:00                         [facility]



                                                Medicine                 Outpati



                                                                        ent



                                                                        Clinics

 

        2020 Outpatient                 [facility]

[id number] [facility]



        16:20:00 16:20:00                         [facility]



                                                Medicine                 Outpati



                                                                        ent



                                                                        Clinics

 

        2020 Outpatient                 [facility]

[id number] [facility]



        16:00:00 16:00:00                         [facility]



                                                Medicine                 Outpati



                                                                        ent



                                                                        Clinics

 

        2020 Outpatient                 [facility]

[id number] [facility]



        15:12:00 15:12:00                         [facility]



                                                Medicine                 Outpati



                                                                        ent



                                                                        Clinics

 

        2020 Outpatient                 [facility]

[id number] [facility]



        15:00:00 15:00:00                         [facility]



                                                Medicine                 Outpati



                                                                        ent



                                                                        Clinics

 

        2020 Outpatient                 [facility]

[id number] [facility]



        17:35:00 17:35:00                         [facility]



                                                Medicine                 Outpati



                                                                        ent



                                                                        Clinics

 

        2020 Outpatient                 [facility]

[id number] [facility]



        16:20:00 16:20:00                         [facility]



                                                Medicine                 Outpati



                                                                        ent



                                                                        Clinics

 

        2020 Outpatient                 [facility]

[id number] [facility]



        11:36:00 11:36:00                         [facility]



                                                Medicine                 Outpati



                                                                        ent



                                                                        Clinics

 

        2019-10-24 2019-10-24 Outpatient                 [facility]

[id number] [facility]



        08:00:00 08:00:00                         [facility]                 Outpati



                                                                        ent



                                                                        Clinics

 

        2019 Outpatient                 [facility]

[id number] [facility]



        16:00:00 16:00:00                         [facility]                 Outpati



                                                                        ent



                                                                        Clinics

 

        2019 Outpatient                 [facility]

[id number] [facility]



        11:46:00 11:46:00                         [facility]                 Outpati



                                                                        ent



                                                                        Clinics

 

        2019 Outpatient                 [facility]

[id number] [facility]



        16:40:00 16:40:00                         [facility]



                                                                        ent



                                                                        [facility]

 

        2018 Outpatient FAIZAN RUIZ  [facility]    [id number]

[address]



        03:56:00 05:45:00                 [facility]

 

        2018 Outpatient FAIZAN RUIZ  [facility]    [id number]

[address]



        04:10:00 07:30:00                 [facility]

 

        2018 Outpatient FAIZNA RUIZ  [facility]    [id number]

[address]



        04:29:00 06:32:00                 [facility]







Results







           Test Description Test Time  Test Comments Results    Result Comments 

Source









                    PREGNANCY URINE MONOCLONAL**FB** 2018 04:58:00 









                      Test Item  Value      Reference Range Interpretation Comme

nts









             PREG UR (test code = PGU) NEGATIVE     NEGATIVE                  



PREGNANCY URINE MONOCLONAL**FB**2018 05:47:00





             Test Item    Value        Reference Range Interpretation Comments

 

             PREG UR (test code = PGU) NEGATIVE     NEGATIVE                  



PREGNANCY URINE MONOCLONAL**FB**2018 04:51:00





             Test Item    Value        Reference Range Interpretation Comments

 

             PREG UR (test code = PGU) NEGATIVE     NEGATIVE                  



PREGNANCY URINE MONOCLONAL**FB**2018 04:50:00





             Test Item    Value        Reference Range Interpretation Comments

 

             PREG UR (test code = PGU) NEGATIVE     NEGATIVE

## 2020-10-13 NOTE — OP
Date of Procedure:  10/13/2020



Surgeon:  Guera Andrews MD



Preoperative Diagnosis:  Excessive menstrual cycle with leiomyomata (AUB-L).



Postoperative Diagnosis:  Excessive menstrual cycle with leiomyomata (AUB-L) and possible cervical ma
ss.



Procedures Performed:  

1.Exam under anesthesia, cervical biopsies.  

2.Hysteroscopy, polypectomy with MyoSure and dilatation and curettage.



Anesthesia:  General with LMA.



Specimens:  Cervical biopsy and endometrial curettings with polyps.



Complications:  No complications.



Drains:  No drains.



Condition:  Stable.



Ebl:  Minimal.



Findings:  On exam on direct visualization on the cervical canal, the circumferential irregular tissu
e starting at 1 o'clock to 5 o'clock in anticlockwise direction, appeared to be irregular and concern
ing for a cervical mass, so this was biopsied with punch biopsy.  Then, on diagnostic hysteroscopy, 3
 polyps were noted, 1 at the fundus posterior wall and 2 on the right lateral wall.  There was impres
chaparro of a leiomyoma on the left wall of the uterus.  Rest of the endometrial cavity unremarkable.



Indications:  The patient is a 49-year-old with bleeding.  She was evaluated with transvaginal ultras
ound, had fibroid in her uterus.  Endometrial lining was thickened concerning for polyps or a myoma. 
 She was unable to tolerate even an office exam very well and therefore wanted to have a hospital-bas
ed procedure for pain control and sedation, so this could be feasible with optimal visualization and 
performance of all the procedures that were indicated.



Description Of Procedure:  After informed consent was verified, she was taken back to OR, placed in a
 supine fashion on the operating table.  General anesthesia was given.  Placed in a dorsal lithotomy 
position.  Vulva, vagina, perineum were prepped in a sterile fashion.  Speculum was placed to expose 
the cervix.  Anterior lip was grasped with a single-tooth tenaculum.  After visualizing the entire ce
rvix well, cervical punch biopsy forceps was taken and biopsies were performed at 8, 9 and 10 o'clock
 positions and 12.  Cauterization here with the help of astringent was done with scope at the end of 
the procedure. 

Then, direct hysteroscopy was performed with SlimLine hysteroscope entering the uterine cavity.  Once
 the cavity was entered, the polyps were noted in the fundal aspect in the center posterior wall and 
on the right lateral wall small polyp polyps were seen as well.  The rest of the endometrium thickene
d, but impression of the fibroid on the left side.  The diagnostic scope was pulled out and a hystero
scopic resecting scope was introduced.  The MyoSure Lite device was inserted and the polyps were cut 
and removed and then the endometrial curettings were performed with the help of the same device as we
ll as the left lateral wall biopsy, which probably could include small fragments of the myoma. 



There was excellent hemostasis.  All the instruments were removed.  Instrument and sponge counts were
 done and were correct at the end of the case.  The patient tolerated the procedure well.  She was re
covered from anesthesia and taken to the PACU in a stable condition.  She was given 30 mg of Toradol 
prior to being woken up.  She has a 1 week followup appointment in the office.  She will be discharge
d today after tolerating diet and ambulating well.  No pain medication, ibuprofen, and other discharg
e instructions have been given to the patient in the form of a handout.  I looked for her significant
 other 

but was unable to find him.  We will communicate results with the patient later.





STARLA

DD:  10/13/2020 09:33:55Voice ID:  [id number]

DT:  10/13/2020 10:24:24Report ID:  [id number]

## 2020-11-12 ENCOUNTER — HOSPITAL ENCOUNTER (EMERGENCY)
Dept: HOSPITAL 97 - ER | Age: 50
Discharge: HOME | End: 2020-11-12
Payer: COMMERCIAL

## 2020-11-12 DIAGNOSIS — I10: ICD-10-CM
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
## 2020-11-12 DIAGNOSIS — F41.8: ICD-10-CM
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
## 2020-11-12 DIAGNOSIS — S93.691A: Primary | ICD-10-CM
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
## 2020-11-12 PROCEDURE — 99283 EMERGENCY DEPT VISIT LOW MDM: CPT

## 2020-11-12 NOTE — ER
Nurse's Notes                                                                                     

 [facility]                                                                 

Name: Maricel Varner                                                                              

Age: 49 yrs                                                                                       

Sex: Female                                                                                       

: 1970                                                                                   

MRN: [id number]                                                                                   

Arrival Date: 2020                                                                          

Time: 20:32                                                                                       

Account#: [id number]                                                                            

Bed 15                                                                                            

Private MD:                                                                                       

Diagnosis: Contusion of right foot;Other sprain of right foot                                     

                                                                                                  

Presentation:                                                                                     

                                                                                             

20:50 Chief complaint: Patient states: she tripped and fell just PTA injuring right foot      bb  

      which is painful pt also received new dx today of cervical and uterine cancer which has     

      spread to the lymph nodes. Coronavirus screen: At this time, the client does not            

      indicate any symptoms associated with coronavirus-19. The client reports previous COVID     

      testing was negative. Ebola Screen: No symptoms or risks identified at this time.           

      Initial Sepsis Screen: Does the patient meet any 2 criteria? No. Patient's initial          

      sepsis screen is negative. Does the patient have a suspected source of infection? No.       

      Patient's initial sepsis screen is negative. Risk Assessment: Do you want to hurt           

      yourself or someone else? Patient reports no desire to harm self or others. Onset of        

      symptoms was 2020.                                                             

20:50 Method Of Arrival: Wheelchair                                                           bb  

20:50 Acuity: NAY 4                                                                           bb  

                                                                                                  

OB/GYN:                                                                                           

20:54 LMP N/A -                                                                               bb  

                                                                                                  

Historical:                                                                                       

- Allergies:                                                                                      

20:54 No Known Allergies;                                                                     bb  

- Home Meds:                                                                                      

20:54 Buspirone Oral [Active]; lisinopril Oral [Active]; HCTZ [Active]; Trintellix oral oral  bb  

      [Active];                                                                                   

- PMHx:                                                                                           

20:54 Hepatitis; Hypertension; Depression; Anxiety; cervical/uterine cancer;                  bb  

- PSHx:                                                                                           

20:54 Tonsillectomy; Dental surgeries;                                                        bb  

                                                                                                  

- Immunization history:: Adult Immunizations up to date.                                          

- Social history:: Smoking status: Patient denies any tobacco usage or history of.                

  Patient uses alcohol, occasionally. Patient/guardian denies using street drugs.                 

                                                                                                  

                                                                                                  

Screenin:00 Abuse screen: Denies threats or abuse. Nutritional screening: No deficits noted.        jb4 

      Tuberculosis screening: No symptoms or risk factors identified. Fall Risk None              

      identified.                                                                                 

                                                                                                  

Assessment:                                                                                       

21:00 General: Appears in no apparent distress. uncomfortable, Behavior is calm, cooperative, jb4 

      appropriate for age. Pain: Complains of pain in dorsum of right foot Pain does not          

      radiate. Pain currently is 10 out of 10 on a pain scale. Quality of pain is described       

      as throbbing. Neuro: Level of Consciousness is awake, alert, obeys commands, Oriented       

      to person, place, time, situation. Cardiovascular: Patient's skin is warm and dry.          

      Pulses are 3+ in right dorsalis pedis artery. Respiratory: Airway is patent Respiratory     

      effort is even, unlabored, Respiratory pattern is regular, symmetrical. GI: No signs        

      and/or symptoms were reported involving the gastrointestinal system. : No signs           

      and/or symptoms were reported regarding the genitourinary system. EENT: No signs and/or     

      symptoms were reported regarding the EENT system. Derm: Skin is intact, Skin is pink,       

      warm \T\ dry. Musculoskeletal: Circulation, motion, and sensation intact. Range of          

      motion: intact in all extremities, Swelling present in dorsum of right foot.                

                                                                                                  

Vital Signs:                                                                                      

20:50  / 77; Pulse 101; Resp 18 S; Temp 99.5; Pulse Ox 98% on R/A; Weight 88.45 kg (R); bb  

      Height 5 ft. 7 in. (170.18 cm) (R); Pain 10/10;                                             

21:00  / 75; Pulse 89; Resp 16; Pulse Ox 97% on R/A;                                    jb4 

20:50 Body Mass Index 30.54 (88.45 kg, 170.18 cm)                                             bb  

                                                                                                  

ED Course:                                                                                        

20:32 Patient arrived in ED.                                                                  bp1 

20:45 [facility]                                                        kb  

20:45 Gilberto Blackwell MD is Attending Physician.                                             kb  

20:52 Triage completed.                                                                       bb  

20:54 Arm band placed on Patient placed in an exam room, on a stretcher, on pulse oximetry.   bb  

21:00 Patient has correct armband on for positive identification. Bed in low position. Call   jb4 

      light in reach. Side rails up X 1. Pulse ox on. NIBP on.                                    

21:02 Juan Pablo Carcamo, RN is Primary Nurse.                                                     jb4 

21:41 Foot Right 3 View XRAY In Process Unspecified.                                          EDMS

                                                                                                  

Administered Medications:                                                                         

21:11 Drug: Norco 10 mg-325 mg 1 tabs Route: PO;                                              jb4 

                                                                                                  

                                                                                                  

Outcome:                                                                                          

21:44 Discharge ordered by MD.                                                                kb  

22:23 Patient left the ED.                                                                    jb4 

                                                                                                  

Signatures:                                                                                       

Dispatcher [employer]                           EDMS                                                 

Nickolas, Gi, FNP-C                 FNP-Elaina Sandoval, RN                     RN   bb                                                   

Juan Pablo Carcamo RN                       RN   jb4                                                  

Melissa Prater                           [facility]                                                  

                                                                                                  

**************************************************************************************************

## 2020-11-12 NOTE — XMS REPORT
Summarization of Episode Note

                           Created on:2020



Patient:Maricel Varner

Sex:Female

:1970

External Reference #:[id number]





Demographics







                          Address                   [address] 



                                                    [place] [address]

 

                          Home Phone                [phone]

 

                          Mobile Phone              [phone]

 

                          Email Address             [email or web address]

 

                          Preferred Language        en

 

                          Marital Status            Unknown

 

                          Pentecostal Affiliation     Unknown

 

                          Race                      White

 

                          Ethnic Group              Not  or 









Author







                          Organization              [facility]

 

                          Address                   [address]. [address]



                                                    [place] [address]

 

                          Phone                     [phone]









Support







                Name            Relationship    Address         Phone

 

                Telly          Unavailable     [id number]    [phone]



                                                [place] [address] 

 

                Telly          Unavailable     [id number]    [phone]



                                                [place] [address] 









Care Team Providers







                    Name                Role                Phone

 

                    Mxaim               Unavailable         [phone]









PROBLEMS







        Type    Condition ICD9-CM OAX31-NX Onset   Condition SNOMED Code Notes



                        Code    Code    Dates   Status          

 

        Problem Malignant         C54.9           Active  575560644 



                neoplasm of                                         



                body of uterus,                                         



                unspecified                                         



                site                                            

 

        Problem Malignant         C53.9           Active  591791402 



                neoplasm of                                         



                cervix,                                         



                unspecified                                         



                site                                            

 

        Problem Hypertension,         I10             Active  98461174 



                unspecified                                         



                type                                            

 

        Problem Major           F32.4           Active  14839070 



                depressive                                         



                disorder with                                         



                single episode,                                         



                in partial                                         



                remission                                         

 

        Problem Muscle cramps         R25.2           Active  94336380 

 

        Problem Anxiety         F41.9           Active  47438578 







ALLERGIES

No Known Allergies



ENCOUNTERS from 1970 to 2020-10-30







             Encounter    Location     Date         Provider     Diagnosis

 

             [facility] [address] [facility] 30 Oct, 2020 Talia ingram neoplasm

of



             Family Medicine [address] [place],                           cervix,

 unspecified



                          [place] [address]                           site C53.9 and



                                                                 Malignant neopl

asm of



                                                                 body of uterus,



                                                                 unspecified sit

e



                                                                 C54.9







IMMUNIZATIONS

No Information



SOCIAL HISTORY

Tobacco Use:





                    Social History Observation Description         Date

 

                    Details (start date - stop date) Never Smoker        



Sex Assigned At Birth:





                          Social History Observation Description

 

                          Sex Assigned At Birth     Unknown



PHQ9





                    Question            Answer              Notes

 

                    Little interest or pleasure in doing things Nearly every day

    

 

                    Feeling down, depressed, or hopeless Nearly every day    

 

                    Trouble falling or staying asleep or sleeping too much Nearl

y every day    

 

                    Feeling tired or having little energy Nearly every day    

 

                    Poor appetite or overeating Not at all          

 

                    Feeling bad about yourself, or that you are a failure, or No

t at all          



                    have let yourself or your family down                     

 

                    Trouble concentrating on things, such as reading the Several

 days        



                    newspaper or watching television                     

 

                    Moving or speaking so slowly that other people could have No

t at all          



                    noticed; or the opposite, being so fidgety or restless that 

                    



                    you have been moving around a lot more than usual           

          

 

                    Total Score         13                  

 

                    Interpretation      Moderate Depression 

 

                    Thoughts that you would be better off dead or of hurting Not

 at all          



                    yourself in some way                     



Alcohol Screen





                    Question            Answer              Notes

 

                    Did you have a drink containing alcohol in the past Yes     

            



                    year?                                   

 

                    Points              1                   

 

                    Interpretation      Negative            

 

                    How often did you have a drink containing alcohol in Monthly

 or less (1 point) 



                    the past year?                          



Tobacco Use/Smoking





                    Question            Answer              Notes

 

                    Are you a           never smoker        







REASON FOR REFERRAL

No Information



VITAL SIGNS

No information



MEDICATIONS







             Medication   SIG (Take, Route, Frequency, Start Date   End Date    

 Status



                          Duration)                              

 

             Zestoretic 20-25 MG take 1 tablet every day Orally Once a          

                 Active



                          day for 90 days                           

 

             BusPIRone HCl 7.5 MG 1 tablet Orally Three times a day for         

                  Active



                          90 days                                

 

             Trintellix 10 MG TAKE 1 TABLET BY MOUTH EVERY DAY for              

             Active



                          90                                     







PROCEDURES

No Information



RESULTS

No Results



REASON FOR VISIT

speak to Lexie Pan



MEDICAL (GENERAL) HISTORY







                    Type                Description         Date

 

                    Medical History     Breast mass         

 

                    Medical History     Abnormal mammogram  

 

                    Medical History     Muscle cramps       

 

                    Medical History     Hypokalemia         

 

                    Medical History     Hypertension, unspecified type 

 

                    Medical History     Hepatitis B carrier 

 

                    Medical History     Hypokalemia         

 

                    Medical History     Epigastric pain     

 

                    Surgical History    Tumor Removal Sx - Left Ring Finger 

 

                    Surgical History    T&A                 

 

                    Surgical History               

 

                    Surgical History    Mass rt breast      2018

 

                    Surgical History    left heel spur      2018

 

                    Surgical History    rt bunionectomy     2018

 

                    Surgical History    tendon release lt mid finger and left th

umb 2019

 

                    Surgical History    tooth extraction    2020







Goals Section

No Information



Health Concerns

No Information



MEDICAL EQUIPMENT

No Information



MENTAL STATUS

No Information



FUNCTIONAL STATUS

No Information



ASSESSMENTS







                    Encounter Date      Diagnosis           Notes

 

                    30 Oct, 2020        Malignant neoplasm of body of uterus, un

specified site (ICD-10 - 



                                        C54.9)              

 

                    30 Oct, 2020        Malignant neoplasm of cervix, unspecifie

d site (ICD-10 - C53.9) 







PLAN OF TREATMENT

Medication





                Medication Name Sig             Start Date      Stop Date

 

                Zestoretic 20-25 MG take 1 tablet every day Orally Once a day   

              



                                for 90 days                     

 

                Trintellix 10 MG TAKE 1 TABLET BY MOUTH EVERY DAY for 90        

         

 

                BusPIRone HCl 7.5 MG 1 tablet Orally Three times a day for 90   

              



                                days                            



Next Appt





                                        Details

 

                                        Provider Name:Talia Pan, 2021 08

:20:00 AM, [address], [address], [place], [address], [phone]







Insurance Providers







          Payer Name Payer Address Payer     Insured   Patient   Coverage  Cover

age End



                              Phone     Name      Relationship to Start Date Duaret

e



                                                  Insured             

 

          [employer]     [address] [phone] Guido Varner        non [place]                         



                    [id number]

## 2020-11-12 NOTE — XMS REPORT
Continuity of Care Document

                          Created on:2020



Patient:WARREN PAGE

Sex:Female

:1970

External Reference #:[id number]





Demographics







                          Address                   [address] [place] [address] [address]



                                                    [place] [address]

 

                          Home Phone                [phone]

 

                          Work Phone                [phone] CELL

 

                          Mobile Phone              [phone]

 

                          Email Address             [email or web address]

 

                          Preferred Language        en

 

                          Marital Status            Unknown

 

                          Christianity Affiliation     Unknown

 

                          Race                      Unknown

 

                          Additional Race(s)        Unavailable



                                                    Unavailable



                                                    White

 

                          Ethnic Group              Unknown









Author







                          Organization              [facility]

t

 

                          Address                   [address] [place] [address] [address]



                                                    [place] [address]

 

                          Phone                     [phone]









Support







                Name            Relationship    Address         Phone

 

                KAYLEE          Unavailable     [address] [facility]  [phone]



                                                [place] [address] 

 

                KAYLEE          Unavailable     [address] [facility]  [phone]



                                                [place] [address] 

 

                Benoitia          Unavailable     [id number]  [address] [phone]



                                                [place] [address] 

 

                Benoitia          Unavailable     [address]  [address] [phone]



                                                [place] [address] 

 

                Alex           Friend          Unavailable     [phone]









Care Team Providers







                    Name                Role                Phone

 

                    HALI              Primary Care Physician Unavailable

 

                    HALI              Attending Clinician Unavailable

 

                    SYSTEM,  NOT IN     Attending Clinician Unavailable

 

                    Hali TOBIAS           Attending Clinician [phone]

 

                    Yudi BROWN,  P     Attending Clinician [phone]

 

                    Juani SANDERS             Attending Clinician [phone]

 

                    JUANI                Attending Clinician Unavailable

 

                    Herve Mcintyre  Attending Clinician Unavailable

 

                    Paul LARRY,  A        Attending Clinician Unavailable

 

                    Ray LARRY,  S      Attending Clinician Unavailable

 

                    Lay TOBIAS            Attending Clinician [phone]

 

                    BHARAT GOTTI        Attending Clinician Unavailable

 

                    Bharat Mccormick     Attending Clinician [phone]

 

                    Katiana SANDERS            Attending Clinician [phone]

 

                    RICHI James MA     Attending Clinician [phone]

 

                    Ashwin TOBIAS  G       Attending Clinician [phone]

 

                    Christina TOBIAS,  SPricila      Attending Clinician [phone]

 

                    Cornelia              Attending Clinician Unavailable

 

                    DR SARA           Attending Clinician Unavailable

 

                    HALI              Admitting Clinician Unavailable

 

                    DR SARA           Admitting Clinician Unavailable









Payers







           Payer Name Policy Type Policy Number Effective Date Expiration Date [employer]            [id number] 2008-10-28 00:00:00            



           OPEN ACCESS                                             







Problems







       Condition Condition Condition Status Onset  Resolution Last   Treating Co

mments 

Source



       Name   Details Category        Date   Date   Treatment Clinician        



                                                 Date                 

 

       Malignant Malignant Disease Active 2020                             MD



       neoplasm neoplasm               0-26                               Alpesh

o



       of cervix of cervix               00:00:                             n



       uteri  uteri                00                                 

 

       Complex Complex Disease Active 2020                             MD



       endometria endometria               0-26                               An

derso



       l      l                    00:00:                             n



       hyperplasi hyperplasi               00                                 



       a      a                                                       

 

       Abnormal Abnormal Disease Active 2020                             MD



       uterine uterine               0-26                               Anderso



       bleeding bleeding               00:00:                             n



                                   00                                 

 

       Malignant Malignant Disease Active 2020                             MD



       neoplasm neoplasm               0-26                               Alpesh

o



       of     of                   00:00:                             n



       endometriu endometriu               00                                 



       m      m                                                       

 

       Anxiety Anxiety Disease Active 2020                             MD



       disorder disorder               0-26                               Alpesh

o



                                   00:00:                             n



                                   00                                 

 

       Essential Essential Disease Active 2020                             MD



       hypertensi hypertensi               0-26                               An

derso



       on     on                   00:00:                             n



                                   00                                 

 

       Chronic Chronic Disease Active 2020                             MD



       depression depression               0-26                               An

derso



                                   00:00:                             n



                                   00                                 







Allergies, Adverse Reactions, Alerts







       Allergy Allergy Status Severity Reaction(s) Onset  Inactive Treating Comm

ents 

Source



       Name   Type                        Date   Date   Clinician        

 

       No Known DA     Active U                                   [facility]



       Allergie                             2-15                        [place]



       s                                  00:00:                      [facility]



                                          00                          dic



                                                                      [facility]



                                                                      l







Family History







           Family Member Diagnosis  Comments   Start Date Stop Date  Source

 

           Maternal aunt Breast cancer                                  MD Raymon

son

 

           Maternal aunt Lung cancer                                  MD Ramos

n

 

           Maternal grandfather Lung cancer                                  MD Blackwell

 

           Maternal uncle Lung cancer                                  MD Betts

on

 

           Paternal aunt Breast cancer                                  MD Raymon

son

 

           Paternal grandmother Lung cancer                                  MD Blackwell

 

           Natural sister Cervical cancer                                  MD Bozena padilla







Social History







           Social Habit Start Date Stop Date  Quantity   Comments   Source

 

           Sex Assigned At                                             MD Betts

on



           Birth                                                  

 

           Exposure to                       Not sure              MD Blackewll



           SARS-CoV-2 (event)                                             

 

           Tobacco use and 2020 Never used            MD Betts

on



           exposure   00:00:00   00:00:00                         

 

           Alcohol intake 2020 Current drinker            MD Bozena padilla



                      00:00:00   00:00:00   of alcohol            



                                            (finding)             

 

           Alcohol Comment 2020-10-26 2020-10-26 social                MD Betts

on



                      00:00:00   00:00:00                         









                Smoking Status  Start Date      Stop Date       Source

 

                Never smoker                                    MD Blackwell







Medications







       Ordered Filled Start  Stop   Current Ordering Indication Dosage Frequency

 Signature

                    Comments            Components          Source



     Medication Medication Date Date Medication? Clinician                (SIG) 

          



     Name Name                                                   

 

     aspirin 81      2020      Yes            81mg      Take 81 mg           M

D



     mg EC      1-12                               by mouth           Anderso



     tablet      16:58:                               daily.           n



               55                                                

 

     LYSINE ORAL      2020      Yes            1{tbl}      Take 1           MD



               1-12                               tablet by           Anderso



               16:58:                               mouth           n



               55                                 daily.           

 

     POTASSIUM      2020      Yes            1{tbl}      Take 1           MD



     ORAL      1-12                               tablet by           Anderso



               16:58:                               mouth           n



               55                                 daily.           

 

     Trintellix      2020      Yes            1{tbl}      Take 1           MD



     10 mg tab      0-23                               tablet by           Raymon

so



               00:00:                               mouth           n



               00                                 daily.           

 

     busPIRone            Yes            1{tbl}      Take 1           MD



     (BUSPAR)      8-21                               tablet by           Alpesh

o



     7.5 mg      00:00:                               mouth 3           n



     tablet      00                                 (three)           



                                                  times a           



                                                  day.           

 

     lisinopril-            Yes            1{tbl}      Take 1           MD



     hydroCHLORO      8-20                               tablet by           And

erso



     thiazide      00:00:                               mouth           n



     (PRINZIDE,Z      00                                 daily.           



     ESTORETIC)                                                        



     20-25 mg                                                        



     per tablet                                                        

 

     Trintellix Trintellix       Yes  Talia                TAKE 1         

  [facility]



               4-05           [place]                TABLET BY           [facility]



               00:00:                               MOUTH           [facility]



               00                                 EVERY DAY           l



                                                                 [facility]



                                                                 ent



                                                                 Clinics

 

     Zestoretic Zestoretic           Yes  Talia                take 1           

[facility]



                              [place]                tablet           [facility]



                                                  every day           [facility]



                                                                 l



                                                                 Outpati



                                                                 ent



                                                                 Clinics

 

     BusPIRone BusPIRone           Yes  Talia                1 tablet           

[facility]



     HCl  HCl                 [place]                               [facility]



                                                                 [facility]



                                                                 l



                                                                 Outpati



                                                                 ent



                                                                 Clinics







Vital Signs







             Vital Name   Observation Time Observation Value Comments     Source

 

             WEIGHT       2020 10:48:00 89.3 kg                   

 

             WEIGHT       2020 10:48:00 89.3 kg                   

 

             Systolic blood pressure 2020 16:57:34 132 mm[Hg]             

   MD Blackwell

 

             Diastolic blood pressure 2020 16:57:34 86 mm[Hg]             

    MD Blackwell

 

             Heart rate   2020 16:57:34 67 /min                   MD Ennis

son

 

             Body temperature 2020 16:57:34 36.72 Mercedes                 MD ANISHA estes

 

             Respiratory rate 2020 16:57:34 16 /min                   MD ANISHA estes

 

             Oxygen saturation in 2020 16:57:34 99 /min                   

MD Blackwell



             Arterial blood by Pulse                                        



             oximetry                                            

 

             Body weight  2020 16:48:00 89.3 kg                   MD Raymon don

 

             BMI          2020 16:48:00 31.64 kg/m2               MD Raymon

arian

 

             Body height  2020 20:36:00 168 cm                    MD Raymonvishnu don







Procedures







                Procedure       Date / Time Performed Performing Clinician [facility]

e

 

                PETCT CONTRAST ENHANCED 2020 22:30:00 Ina Gloria MD



                INITIAL TREATMENT STRATEGY                                 

 

                PROTHROMBIN TIME 2020 20:12:00 Ina Gloria MD

 

                TYPE AND SCREEN 2020 20:12:00 Ina Gloria MD

 

                PARTIAL THROMBOPLASTIN TIME 2020 20:12:00 Ina Gloria MD

 

                ABORH           2020 20:12:00 Ina Gloria MD

 

                ANTIBODY SCREEN 2020 20:12:00 Ina Gloria MD

 

                CLOT EXPIRATION DATE 2020 20:12:00 Ina Gloriae

rson

 

                TMP INTERPRETATION ANTIBODY 2020 20:12:00 Ina Gloria MD



                SCREEN NEGATIVE                                 

 

                CONFIRM ABORH TYPE 2020 20:09:00 Ina Gloriaers

on

 

                URINALYSIS WITH MICROSCOPIC IF 2020 20:08:00 Ina Gloria MD



                INDICATED                                       

 

                HUMAN CHORIONIC GONADOTROPIN, 2020 20:08:00 Ina Gloria MD



                QUALITATIVE, URINE                                 

 

                URINALYSIS MICROSCOPIC 2020 20:08:00 Ina Gloria MD

derson

 

                 -NCOV COVID-19 2020 15:21:00 Annalisa Lal MD And

quintinon

 

                IR CT GUIDED BIOPSY 2020 20:42:54 Ina Gloria MD Raymon

son



                MUSCLE/SOFT TISSUE PELVIC                                 

 

                PATHOLOGY BIOPSY 2020 20:24:00 Ina Gloria MD



                INTERPRETATION                                  

 

                PROTHROMBIN TIME 2020 17:41:00 Amanuel Gotti MD

 

                HUMAN CHORIONIC GONADOTROPIN, 2020 17:31:00 Amanuel Gotti MD



                QUALITATIVE, URINE                                 

 

                US HEAD NECK SOFT TISSUE 2020 16:38:15 Ina Gloria MD

 

                US FINE NEEDLE ASPIRATION 2020 16:38:15 Ina Gloria MD

 

                CYTOLOGY IMAGE-GUIDED FNA 2020 14:39:00 Ina Gloria MD



                INTERPRETATION                                  

 

                MRI PELVIS W WO CONTRAST - GYN 2020-10-30 21:36:00 Ina Gloria MD

 

                CT CHEST W CONTRAST 2020-10-28 12:24:00 Ina Gloria MD Raymon

son

 

                PATHOLOGY BIOPSY 2020-10-26 15:20:00 Ina Gloria MD



                INTERPRETATION                                  

 

                COMPREHENSIVE METABOLIC PANEL 2020-10-26 15:16:00 Ina Gloria MD

 

                COMPLETE BLOOD COUNT W/ 2020-10-26 15:16:00 Ina Gloria MDrson



                DIFFERENTIAL                                    

 

                HEMOGLOBIN A1C  2020-10-26 15:16:00 Ina Gloria MD

 

                HC HIV 1/2 AG AND AB 4TH GEN 2020-10-26 15:16:00 Ina Gloria MD

 

                GLUCOSE LEVEL   2020-10-26 15:16:00 Ina Gloria MD

 

                BLOOD UREA NITROGEN 2020-10-26 15:16:00 Ina Gloria MD Raymon

son

 

                ELECTROLYTE PANEL 2020-10-26 15:16:00 Ina Gloria MD Andquintino

n

 

                SERUM CREATININE 2020-10-26 15:16:00 Ina Gloria MD

 

                .GLOMERULAR FILTRATION RATE 2020-10-26 15:16:00 Ina Gloria MD

 

                CALCIUM LEVEL TOTAL 2020-10-26 15:16:00 Ina Gloria MD Raymon

son

 

                ALBUMIN LEVEL   2020-10-26 15:16:00 Ina Gloria MD

 

                ALKALINE PHOSPHATASE 2020-10-26 15:16:00 Ina Gloria MD

rson

 

                ALANINE AMINOTRANSFERASE 2020-10-26 15:16:00 Ina Gloria MD

 

                ASPARTATE AMINOTRANSFERASE 2020-10-26 15:16:00 Ina Gloria

 

                TOTAL PROTEIN   2020-10-26 15:16:00 Ina Gloria MD

 

                FRACTIONATED BILIRUBIN 2020-10-26 15:16:00 Ina Gloria MD

derson

 

                Results CBC     2020-10-26 15:16:00 Ina Gloria MD

 

                MANUAL DIFFERENTIAL 2020-10-26 15:16:00 Ina Gloria MD Raymon

son

 

                TMP HIV 1/2 AG&AB PATH INTERP 2020-10-26 15:16:00 Ina Gloria MD

 

                MICHELLE MISCELLANEOUS TEST 2020-10-26 15:16:00 Ina Gloria MD

nderson

 

                 -NCOV COVID-19 2020-10-22 18:14:00 Annalisa Lal MD And

erson

 

                PATHOLOGY OUTSIDE 2020-10-13 00:00:00 Carla Vasquez MD



                INTERPRETATION                                  

 

                OSI US PELVIC   2020 16:48:17 Annalisa Lal MD







Plan of Care







             Planned Activity Planned Date Details      Comments     Source







Encounters







        Start   End     Encounter Admission Attending Care    Care    Encounter 

Source



        Date/Time Date/Time Type    Type    Clinicians Facility Department ID   

   

 

        2020-10-27         Inpatient         BAILEE LAL     GYN     [id number] 

MD



        17:10:12                         ANNALISA           Oncology         Alpesh currie

 

        2020-10-21         Outpatient         [facility], MDA     MDA     [id number]

 MD



        10:32:06                         PROVIDER                         Alpesh

o



                                                                        rosey

 

        2020 Outpatient EL      HALI BAILEE     MDA     [id number]

[id number] MD



        10:26:34 13:05:48                 ANNALISA                           Alpesh

o



                                                                        n

 

        2020 Outpatient EL      INA GLORIA MDA     MDA     107

[id number] MD



        14:20:38 14:20:38                                                 Alpesh

o



                                                                        rosey

 

        2020 Outpatient EL      INA GLORIA     MDA     107

[id number] MD



        13:48:22 13:48:22                                                 Alpesh

o



                                                                        n

 

        2020 Outpatient EL      INA GLORIA MDA     MDA     107

[id number] MD



        09:14:15 09:43:40                                                 Alpesh

o



                                                                        n

 

        2020-11-10 2020-11-10 Outpatient EL      INA GLORIA MDA     MDA     107

[id number] MD



        09:02:42 09:42:36                                                 Alpesh

o



                                                                        rosey

 

        2020 Outpatient EL      INA GLORIA MDA     MDA     107

[id number] MD



        12:41:43 23:59:00                                                 Alpesh

o



                                                                        n

 

        2020 Outpatient EL      AMANUEL GOTTI MDA     MDA     [id number]

[id number] MD



        11:31:33 12:40:00                                                 Alpesh

o



                                                                        rosey

 

        2020 Outpatient INA DURAN     MDA     107

[id number] MD



        07:37:43 07:37:43                                                 Alpesh currie

 

        2020 Outpatient BAILEE HOROWITZ     MDA     [id number]

[id number] MD



        10:30:00 23:59:00                 ANNALISA currie

 

        2020-10-30 2020-10-30 Outpatient INA DURAN     MDA     107

[id number] MD



        14:40:32 14:40:32                                                 Alpesh currie

 

        2020-10-30 2020-10-30 Outpatient                 [facility]  [facility]  [id number]

 [facility]



        00:00:00 00:00:00                                                 [facility]



                                                                        ent



                                                                        Clinics

 

        2020-10-28 2020-10-28 Outpatient BAILEE HOROWITZ     MDA     [id number]

[id number] MD



        11:46:26 11:46:26                 ANNALISA currie

 

        2020-10-28 2020-10-28 Outpatient INA DURAN     MDA     107

[id number] MD



        07:02:02 07:02:02                                                 Alpesh currie

 

        2020-10-26 2020-10-26 Outpatient INA DURAN     MDA     [id number]

[id number] MD



        09:58:12 10:17:54                                                 Alpesh currie

 

        2020-10-26 2020-10-26 Outpatient BAILEE HOROWITZ     MDA     [id number]

[id number] MD



        08:01:09 08:01:09                 ANNALISA currie

 

        2020-10-26 2020-10-26 Outpatient BAILEE HOROWITZ     MDA     [id number]

[id number] MD



        07:54:14 07:56:50                 ANNALISA currie

 

        2020-10-22 2020-10-22 Outpatient BAILEE HOROWITZ     MDA     [id number]

[id number] MD



        13:06:29 13:06:29                 ANNALISA currie

 

        2020 Outpatient                 [facility] [address]

[id number] [facility]



        08:20:00 08:20:00                         [facility]



                                                Medicine                 Outpati



                                                                        ent



                                                                        Clinics

 

        2020 Outpatient                 [facility] [address]

[id number] [facility]



        16:20:00 16:20:00                         [facility]



                                                Medicine                 Outpati



                                                                        ent



                                                                        Clinics

 

        2020 Outpatient                 [facility]

[id number] [facility]



        16:00:00 16:00:00                         t [facility]



                                                Medicine                 Outpati



                                                                        ent



                                                                        Clinics

 

        2020 Outpatient                 [facility]

[id number] [facility]



        15:12:00 15:12:00                         t [facility]



                                                Medicine                 Outpati



                                                                        ent



                                                                        Clinics

 

        2020 Outpatient                 [facility]

[id number] [facility]



        15:00:00 15:00:00                         t [facility]



                                                Medicine                 Outpati



                                                                        ent



                                                                        Clinics

 

        2020 Outpatient                 [facility]

[id number] [facility]



        17:35:00 17:35:00                         t [facility]



                                                Medicine                 Outpati



                                                                        ent



                                                                        Clinics

 

        2020 Outpatient                 [facility]

[id number] [facility]



        16:20:00 16:20:00                         t [facility]



                                                Medicine                 Outpati



                                                                        ent



                                                                        Clinics

 

        2020 Outpatient                 [facility]

[id number] [facility]



        11:36:00 11:36:00                         t [facility]



                                                Medicine                 Outpati



                                                                        ent



                                                                        Clinics

 

        2019-10-24 2019-10-24 Outpatient                 [facility]

[id number] [facility]



        08:00:00 08:00:00                         t [facility]



                                                Medicine                 Outpati



                                                                        ent



                                                                        Clinics

 

        2019 Outpatient                 [facility]

[id number] [facility]



        16:00:00 16:00:00                         t [facility]



                                                Medicine                 Outpati



                                                                        ent



                                                                        Clinics

 

        2019 Outpatient                 [facility]

[id number] [facility]



        11:46:00 11:46:00                         t [facility]



                                                Medicine                 Outpati



                                                                        ent



                                                                        Clinics

 

        2019 Outpatient                 [facility]

[id number] [facility]



        16:40:00 16:40:00                         [facility]



                                                Medicine                 Outpati



                                                                        ent



                                                                        Clinics

 

        2018 Outpatient FAIZAN RUIZ  [facility]    [id number]

[address]



        03:56:00 05:45:00                 [facility]

 

        2018 Outpatient FAIZAN RUIZ  [facility]    [id number]

[address]



        04:10:00 07:30:00                 [facility]

 

        2018 Outpatient FAIZAN RUIZ  [facility]    [id number]

[address]



        04:29:00 06:32:00                 [facility]







Results







           Test Description Test Time  Test Comments Results    Result     [facility]

e



                                                       Comments   

 

           PETCT Contrast 2020             Primary neoplasm            MD 

Rishi



           Enhanced Initial 14:54:20              is noted within the           

 



           Treatment                        cervix. Nodular            



           Strategy                         density is noted in            



                                            the paracervical            



                                            region, may            



                                            represent a            



                                            paracervical lymph            



                                            node Left common            



                                            iliac lymph node            



                                            has been biopsied            



                                            and demonstrated no            



                                            evidence of            



                                            lymphoid tissue.            



                                            Interface,            



                                            Radiology Results            



                                            In - 2020            



                                            8:56 AM CSTFULL            



                                            RESULT:Examination:            



                                             Contrast-Enhanced            



                                            FDG PET/CT,            



                                            2020 4:30            



                                            PMClinical History:            



                                            Squamous cell            



                                            cancer of the            



                                            cervixIndication:            



                                            Evaluate disease            



                                            statusComparison:            



                                            None correlation            



                                            was made to an MRI            



                                            dated 2020Technique:            



                                            Following             



                                            intravenous            



                                            administration of            



                                            10.9 mCi of F-18            



                                            FDG via the left            



                                            and cubital vein a            



                                            PET/CT was            



                                            performed from the            



                                            vertex of the skull            



                                            to the proximal            



                                            thighs. The blood            



                                            glucose level at            



                                            the time of            



                                            injection was 105            



                                            mg per DL. CT            



                                            scanning was done            



                                            for attenuation            



                                            correction, image            



                                            registration, and            



                                            diagnosis with scan            



                                            parameters            



                                            optimized to            



                                            minimize radiation            



                                            exposure to the            



                                            patient. The CT            



                                            portion of the            



                                            examination was            



                                            performed with            



                                            intravenous and            



                                            enteric contrast.            



                                            SUV measurements            



                                            are reported as            



                                            maximum SUV based            



                                            on body weight            



                                            unless otherwise            



                                            specified.            



                                            Findings: Head and            



                                            Neck: No FDG avid            



                                            disease is noted            



                                            within the brain.            



                                            No neck adenopathy            



                                            is noted to suggest            



                                            metastatic            



                                            disease.Chest:            



                                            Biopsy-proven            



                                            pulmonary nodules            



                                            are noted within            



                                            the thyroid gland.            



                                            No lung nodules            



                                            identified to            



                                            suggest metastatic            



                                            disease.              



                                            Nonspecific right            



                                            upper lobe            



                                            pulmonary nodule is            



                                            noted measures less            



                                            than 5 mm and is            



                                            unchanged since the            



                                            prior study. The            



                                            heart is normal            



                                            size.Abdomen and            



                                            Pelvis: No focal            



                                            hepatic lesions            



                                            identified to            



                                            suggest malignancy.            



                                            There is no            



                                            intrahepatic or            



                                            extra hepatic            



                                            biliary ductal            



                                            dilation. The            



                                            spleen the adrenal            



                                            glands and the            



                                            pancreas are            



                                            normal. There is no            



                                            evidence              



                                            hydronephrosis. The            



                                            gallbladder is            



                                            normal.A left            



                                            common iliac lymph            



                                            node is noted            



                                            measures 1.4 x 1.1            



                                            cm is FDG avid has            



                                            a maximum SUV of            



                                            4.5 has been            



                                            biopsied previously            



                                            however no lymphoid            



                                            tissue is noted            



                                            within this lymph            



                                            node.A mass is            



                                            noted within the            



                                            cervix has a            



                                            maximum SUV of            



                                            13.6. Left            



                                            paracervical nodule            



                                            is identified has a            



                                            maximum SUV of 4.0            



                                            and is of concern            



                                            for metastatic            



                                            disease. Fibroids            



                                            are noted within            



                                            the uterus. Cysts            



                                            are noted within            



                                            the ovaries            



                                            bilaterally.Musculo            



                                            skeletal: No            



                                            definite skeletal            



                                            metastases are            



                                            noted.IMPRESSION:Pr            



                                            imary neoplasm is            



                                            noted within the            



                                            cervix. Nodular            



                                            density is noted in            



                                            the paracervical            



                                            region, may            



                                            represent a            



                                            paracervical lymph            



                                            nodeLeft common            



                                            iliac lymph node            



                                            has been biopsied            



                                            and demonstrated no            



                                            evidence of            



                                            lymphoid tissue.            









                    MD COVID-19 (PRASHANTH-CoV-2) PCR Asymptomatic 2020 06:10:49

 









                      Test Item  Value      Reference Range Interpretation Comme

nts









             COVID19 SARS Indication (test Pre-OR Procedure                     

      



             code = 02408)                                        

 

             COVID19 SARS Result (test code Not Detected Not Detected           

   



             = 79221-9)                                          

 

             COVID19 SARS Interpretation SARS-CoV-2 NOT Detected. Reference     

                      



             (test code = 72260) Range: Not Detected Methodology: The           

                



                          [employer] RealTime SARS-CoV-2 assay is a                 

          



                          qualitative real-time reverse                         

  



                          transcription polymerase chain                        

   



                          reaction (rRT-PCR) test to detect RNA                 

          



                          from SARS-CoV-2 in nasal,                           



                          nasopharyngeal and oropharyngeal swabs                

           



                          from patients with signs and symptoms                 

          



                          of infection who are suspected of                     

      



                          COVID-19 by their health care                         

  



                          provider. The [employer] RealTime                         

  



                          SARS-CoV-2 performed on the [employer]                    

       



                          m2000 System is a dual target assay                   

        



                          with primers and probes for the RdRp                  

         



                          and N genes. Results must be                          

 



                          interpreted within the context of all                 

          



                          relevant clinical and laboratory                      

     



                          findings, and epidemiological risk                    

       



                          factors. Positive results are                         

  



                          indicative of the presence of                         

  



                          SARS-CoV-2 RNA; clinical correlation                  

         



                          with patient history and other                        

   



                          diagnostic information is necessary to                

           



                          determine patient infection status.                   

        



                          Positive results do not rule out                      

     



                          bacterial infection or co-infection                   

        



                          with other viruses. Negative results                  

         



                          do not preclude SARS-CoV-2 infection                  

         



                          and should not be used as the sole                    

       



                          basis for patient management                          

 



                          decisions. The [employer] RealTime                        

   



                          SARS-CoV-2 assay is for in vitro                      

     



                          diagnostic use under FDA Emergency Use                

           



                          Authorization only. Testing is limited                

           



                          to laboratories certified under the                   

        



                          Clinical Laboratory Improvement                       

    



                          Amendments of 1988 (CLIA), 42U.S.C.                   

        



                          263a, to perform high complexity                    

       



                          tests. The Test was performed by the                  

         



                          CLIA-certified, high-complexity                       

    



                          [facility] ([facility])                

           



                          at [facility] under the                

           



                                                    Food and Drug Administration

 (FDA)



                                                            



                          s Emergency Use Authorization.                        

   



                          Factsheet for patients:                           



                          https://[email or web address]/AbbottFactS                

           



                          heetPatientsFactsheet for healthcare                  

         



                          providers:                             



                          https://www.[email or web address]/AbbottFactS                

           



                          heetHCP Test performed by:The                         

  



                          [facility] [address] [place] [address]                        

   



MD BlackwellTM Interpretation Antibody Screen [id number] 02:03:55





             Test Item    Value        Reference Range Interpretation Comments

 

             TMP Auto Neg  At the present                           ____________

____________



             ABSC Interp  time, patient                           ______________

__________



             (test code = plasma shows no                           ____SHELBY GARCIAS MD -



             7535)        evidence of RBC                           [id number]

d by: SHELBY



                          alloantibodies.                           MD Shahzad LOWE



                                                                 [id number]

ate/Time:



                                                                 2020 20:0

3 PM CST



                                                                  Transcribed Da

te/Time:



                                                                 2020 20:0

3 PM



                                                                 CSTElectronical

ly Signed



                                                                 By: MD Shahzad MOROCHO



                                                                 [id number]  on 



                                                                 20:03 PM



MD BlackwellAntibody [id number] 23:23:07





             Test Item    Value        Reference Range Interpretation Comments

 

             ABSC. (test code = Negative ABSC                           



             890-4)                                              

 

             PRABHJOT (test code = PRABHJOT) Please schedule at [facility]                                 



MD [id number] 23:22:52





             Test Item    Value        Reference Range Interpretation Comments

 

             ABORh. (test code = A POS                                  



             882-1)                                              

 

             PRABHJOT (test code = PRABHJOT) Please schedule at [facility]                                 



MD BlackwellClot Expiration [id number] 23:22:50





             Test Item    Value        Reference Range Interpretation Comments

 

             T & S Expiration (test code = 2020                           

  



             5318)                                               



MD BlackwellConfirm [id number] 23:22:13





             Test Item    Value        Reference Range Interpretation Comments

 

             ABORh Confirm. (test code = 882-1) A POS                           

       



MD BlackwellPartial Thromboplastin [id number] 20:54:36





             Test Item    Value        Reference Range Interpretation Comments

 

             PTT (test code 27.3         24.2- 36.0                Testing Perfo

rmed



             = 86591-4)                second(s)                 [facility]



                                                                 Ambulatory Care



                                                                 [id number]



                                                                 [address]



                                                                 [id number] Licen

se:



                                                                 [id number]

 

             PRABHJOT (test code Please schedule at                           



             = PRABHJOT)       [facility] lab                           



                          cannot be scheduled                           



                          at the following                           



                          locations due to                           



                          collection/proccess                           



                          ing restrictions:                           



                          [facility] CTR                           



                          and [facility]                           



                          CTR.                                   



MD BlackwellPT/YEB3045-26-74 20:54:35





             Test Item    Value        Reference Range Interpretation Comments

 

             PT (test code 13.7         12.0- 14.3                Testing Perfor

med



             = 5902-2)                 second(s)                 [facility]



                                                                 Ambulatory Care



                                                                 [id number]



                                                                 [address], [address]



                                                                 [address] [address]

[address]

 

             INR (test code 1.10         0.90-1.10                 Testing Perfo

rmed



             = 6301-6)                                           [facility]



                                                                 Ambulatory Care



                                                                 [id number]



                                                                 [address] [address]

[address]

 

             PRABHJOT (test code Please schedule at                           



             = PRABHJOT)       [facility] lab                           



                          cannot be scheduled                           



                          at the following                           



                          locations due to                           



                          collection/proccess                           



                          ing restrictions:                           



                          [facility] CTR                           



                          and [facility]                           



                          CTR.                                   



MD BlackwellUrinalysis with Oqceztvveuh5910-28-14 20:51:13





             Test Item    Value        Reference Range Interpretation Comments

 

             UA WBC (test code = 107          0- 2 /HPF    H            



             7904)                                               

 

             UA RBC (test code = 89           0- 2 /HPF    H            



             7891)                                               

 

             UA Mucous (test code = 2+           TRACE /HPF   A            



             7887)                                               

 

             UA Bacteria (test code 1+           NOT SEEN /HPF A            



             = 7870)                                             

 

             UA Squam Epi (test 4+           OCC /HPF     A            



             code = 7896)                                        

 

             PRABHJOT (test code = PRABHJOT) Some reporting                           



                          parameters within the                           



                          Urinalysis test have                           



                          changed due to the                           



                          implementation of new                           



                          instrumentation in the                           



                          [facility], allowing                           



                          greater sensitivity of                           



                          measurement. Urinalysis                           



                          results reported by the                           



                          [facility]                           



                          using existing                           



                          instrumentation, as                           



                          well as Urinalysis                           



                          testing performed                           



                          manually or by backup                           



                          methodology at the [facility] will remain                           



                          relatively unchanged.                           



                          New reporting                           



                          parameters and units                           



                          will now be reported                           



                          for all [facility].                           

 

             Lab Interpretation Abnormal                               



             (test code = 20664-0)                                        



MD BlackwellUrinalysis with Szwquvjmhgw7717-59-63 20:48:46





             Test Item    Value        Reference Range Interpretation Comments

 

             UA Color (test code = Yellow       Yellow                    



             7877)                                               

 

             UA Appear (test code = Cloudy       Clear        A            



             7868)                                               

 

             UA Glucose (test code = NEG          NEG mg/dL                 



             7881)                                               

 

             UA Bili (test code = 7871) NEG          NEG                       

 

             UA Ketones (test code = NEG          NEG mg/dL                 



             7884)                                               

 

             UA Spec Grav (test code = 1.018        1.002-1.035               



             7894)                                               

 

             UA Blood (test code = Moderate     NEG          A            



             7872)                                               

 

             UA pH (test code = 7909) 5.0          4.5-8.0                   

 

             UA Protein (test code = 30 mg/dL     NEG          A            



             7890)                                               

 

             UA Urobilinogen (test code NEG          NEG                       



             = 7903)                                             

 

             UA Nitrite (test code = NEG          NEG                       



             7888)                                               

 

             UA Leuk Est (test code = Moderate     NEG          A            



             7886)                                               

 

             PRABHJOT (test code = PRABHJOT) Please schedule at                           



                          [facility] campus                            

 

             Lab Interpretation (test Abnormal                               



             code = 66301-9)                                        



MD BlackwellUrine [id number] 20:23:57





             Test Item    Value        Reference Range Interpretation Comments

 

             U beta hCG Ql Negative     Negative                  Very dilute ur

ine



             (test code =                                        specimens may c

ause



             4181)                                               false negative



                                                                 results. Sugges

t



                                                                 repeat in 48 ho

urs



                                                                 with a first mo

rning



                                                                 voided urine or



                                                                 request quantit

ative



                                                                 serum beta HCG 

test.



                                                                 Testing Perform

ed



                                                                 [facility] Amb[facility]

[facility]



                                                                 [address] [address]

[address]

 

             PRABHJOT (test code Please schedule at                           



             = PRABHJOT)       [facility]                            



MD BlackwellPathology Biopsy [id number] 21:52:00





             Test Item    Value        Reference Range Interpretation Comments

 

             Diagnosis (test code = 34) v9ygfPKiPONilDD7QnX                     

      



                          tWYXon6zsi7LkhFXwmE                           



                          IgMMaltTIuqvTzne61g                           



                          VV1mM82AL2aNNOgJxC8                           



                          KAQqzvC5Gje0VSNvSED                           



                          buARrK001m0iyu3oyiw                           



                          UvwLU9uBnzOWOcMZCeB                           



                          UknDNSlHoFzCW8gEAog                           



                          cSdlud9uUNttBOhzdWJ                           



                          tpENmWHn2tVV8OVHrma                           



                          psvFxmCBknfX30RePhA                           



                          pafuw3cRKyto2ZhQJxd                           



                          jGifdXo8j7l6RIhcgbR                           



                          jX9tenxvjCGRtYULzoN                           



                          0dHIOxcJWwGO13bc3ee                           



                          OElZN4rHKv5fQPxCS2h                           



                          TPHzjCejn4KvDCbbAM5                           



                          0vLOcZHTtZTQsDNCix7                           



                          9fUL92ZRdiGLBiuUqfY                           



                          KkjbqFhkHSkIQsUI9XO                           



                          XHBhcn0=                               

 

             Comment (test code = 9835) c0uhsIZqFFOplRK3JfL                     

      



                          rTYFge7jss5HpaIGaoY                           



                          MiTYlrmIAmuiBjhl08a                           



                          FW7yJ89OR2gYCQqZzG3                           



                          DEMtkuJ0Akg7PYGiHGZ                           



                          kkYLwY477j1bzc0pweq                           



                          IjlUU6rAfqJDIkWWKbF                           



                          WluXGZzMjAgUmVwZWF0                           



                          FBTwf4F4GCCfrwQlhTA                           



                          1lL1jIPrxVDdswgIcvm                           



                          RdDW4kJLGapw6=                           

 

             Gross Description (test f2xntGNaCSGlv2rdECQ                        

   



             code = [id number]) uMqEoFGRQy2brd701uE                           



                          NwQFypQtIaTdK5iVDsC                           



                          GDxrSTmz5Z2KPgkkPHg                           



                          AaEDsjhbiXr2q6utO2d                           



                          vhr1qRJ3mAfWdVEErHL                           



                          QwXGZwcnEyIFRpbWVzI                           



                          B9npiWSm27gubt3o9qq                           



                          EYhosE9pXPLsUWOytXN                           



                          zh8O8TPnaaPRcPQUXz8                           



                          OgzAOfHU7ekhd9y7jwA                           



                          Vkrm5gpy7TvWrCuOOCl                           



                          ZXQwXGZwcnEyIEFyaWF                           



                          dEF2wwrSaavl0l7joFX                           



                          ZxMs6sZNUitjiaQ3edt                           



                          vGqwPChYzLrhYLlA925                           



                          auprzyc1g3ftFOBlIr7                           



                          keDfcO6cxhsJqfXMsZd                           



                          BycTIgVHJveSBNaWNyb                           



                          uGyjKH9kFvqHaQcNWWz                           



                          b85hyavdP7tufbUntIQ                           



                          xTaWizBIdJ1gfOc9yN2                           



                          91WWJnFFaoz5swv0NkH                           



                          mNoYXJzZXQwXGZwcnEy                           



                          UTLdG00cDWQEU216GHO                           



                          qVZMeRGSps4ues2olO0                           



                          hhcnNldDBcZnBycTIgQ                           



                          JBzFIt1bQ0Tu4wsy7oo                           



                          vjEfbWQ6YTUsSGYxW6R                           



                          rVC4kGROddPHyK7koBG                           



                          RjMEeylaKviqS3FDOqb                           



                          BGuOIfywqEvWeOjR2Jl                           



                          MQ9nFYkftQSfMuT0BVA                           



                          aRPJ5JXquAFBrVZooXl                           



                          a0XEI9P5mpQCQ0O6era                           



                          tBfzbJlBBFeyLW9Fesw                           



                          yhKbOrslU1AcIE73VXh                           



                          kzMPmNdj8VMZlLYj8BD                           



                          cjHDWmZSHmLph3PMq9H                           



                          1hdJSUjILRbU6ZoRY3s                           



                          KQZrNvb6BSTaRKzcjyI                           



                          rQPA2BKvuNQZiAWI2AO                           



                          TtpBBpKgg1DAKoLLJ6U                           



                          7mwbnPcqvV2W2xbkJFg                           



                          XAVpO4lrDWPzDTphP2U                           



                          iYW7tWMyaRey1YWH8IK                           



                          ecdsBpBTd5QEfkPYGgF                           



                          Vd2BPEwtJKcIpB2NWNc                           



                          SQH8YJgzziVfzpF8DDj                           



                          plXOyQAxuZ3qiYGXgUK                           



                          gbH9VkPV4xXIhmDti0W                           



                          TIwNztccmVkMjIzXGdy                           



                          ZWVuMjIzXGJsdWUyMjM                           



                          7XHJlZDIzOVxncmVlbj                           



                          LgXUxiwFDsVnE2Y8hzJ                           



                          ZKpGFHbE2MwVD3qLJLz                           



                          Nwm3NAZ2FWnxuuRfOJy                           



                          fahVqyaLgEph0WOD8SQ                           



                          e8Klili6U4wELajWRmb                           



                          HtcczEgaGVhZGluZyAx                           



                          B586GFAkJSdsTFLszqy                           



                          xNnn1s1hlQfImTRKkoH                           



                          1eJZI8sVkxfyTkuFIvM                           



                          JbvKzC8X438ABH6BIzq                           



                          YOLbioqjRPo5j9atMgL                           



                          fESLmiX4sEXV5rJ1OVd                           



                          qiXAXpvcleECq4BYzsS                           



                          UHmosxhOoV0EZriIMTx                           



                          nNy2VJlbGYStrpB7Pai                           



                          tYXJndDcyMFxtYXJnYj                           



                          gpBNekUBPeWDM4PyHiH                           



                          JSok8Lnkqq9WeHgKySo                           



                          SQEISnuqcKNcKSB4KHL                           



                          0ZjFcZXBpYzkyMDBcYW                           



                          3ggShkqOl2pRnhMLHrz                           



                          jJ5dEZsJHpqw8uxFNC1                           



                          o0kvkwinOUMuOWszEf5                           



                          udHRibHtcZjAgQXJpYW                           



                          f2wF04TMKvyR3qjMQlZ                           



                          Pq3KNSlirTbqZpcdG7g                           



                          XqRkBPPPHpOJtJ5jsPS                           



                          Py5EkLKTuXFNVSBW0OA                           



                          QMRHo4gUP0YUErbvZzj                           



                          6OePQ5sQPCbyoLpR02t                           



                          SWEqp5CpgWvmtlKfy1S                           



                          9CCBeh8R4ZUG1kWZ5RP                           



                          GnK7KdO2N0TRF0laVaQ                           



                          jAgeCAwLjMgeCAwLjEg                           



                          I967JQMbuZqrEEs4UVH                           



                          7Hc1qpZEzETQbwmUHPY                           



                          2lTOjejf86FJF4BRDlq                           



                          5WsF1IkFYhJOGLop2Bu                           



                          C9RyERoxzEUyicavyvW                           



                          dXCQiwr7fmTAgzQx2                           

 

             Disclaimer (test code = k8gsmMKjQUHbiLAtYaK                        

   



             9844)        wRCOoZPVcp3niOKAwqR                           



                          FuZzEwMzNcZnRuYmpcd                           



                          ZZdILUgDzVgu3wrq189                           



                          gQXdb0ckEVNdWuX7aUU                           



                          aNBDycSGsO831NYPzXG                           



                          pcm6pgt5QjKWOcjUHrc                           



                          4R5HVRUusdifIs5tOwb                           



                          C10jg3W5BmyoR4aaXVK                           



                          aFJCmE2QfKV2pJLCvKc                           



                          v3YLR7YUB0PLZbESYwI                           



                          9XsBH4fUVSzfFFjZSg2                           



                          k0qlcEbqKAVlAIT5q9h                           



                          eJHcnpiVsKF7nqy5ffQ                           



                          d1a6xftvApDJOyAHRfy                           



                          LBZPSFaR1SxvVteHg6a                           



                          bCt5lJzmGoxwAVX6Qla                           



                          5RV9cpo12ild1eSwnBN                           



                          KrhbieUkR3PNjtNECal                           



                          ihkMMj2CXbxOYVvlKE8                           



                          CIBagRZiO3RoYGDvKX1                           



                          annv1IBM9OZfnBNTfRt                           



                          I0VVXiuAHnRRNwvSgaV                           



                          Yqdi133XNB0EcNfSJ9q                           



                          V0Hak1V0lB0qwBHdQML                           



                          qqGDrTnHrQVLazj4qdI                           



                          WeMLpfk9CqOXC6cyN6f                           



                          VIvpLMfPLFfQQ75Gntv                           



                          i4GnGbloMHU7DZBmidS                           



                          mu6Hgn6kxWiNbpcPvJ9                           



                          jsE0RvLLViLPKpMKNwD                           



                          hDftmLvt6Vce2BlnWXo                           



                          zYh2u0mlMJLtTFHqeBc                           



                          ah2snLOZ9JHLpG6F0nK                           



                          Pin8odJYboLPBheHN7w                           



                          yJ0QQYmsZAeX9DzdC1t                           



                          LEIpGX9fozy4w6btPWP                           



                          3SPqkJJYmQpI6adP3GU                           



                          BcaGVhZGVyeTcyMFxmb                           



                          195IJR8LdFoKZNeu4Lz                           



                          Z3UvfIdjR18fgItiV47                           



                          rIPFxcKkpvJ7xfXviuT                           



                          5cZjBcZnMyNFxxbFxwb                           



                          VQidqwkEVnzwwF8OFzm                           



                          meuzHIZmETkhE9fsEuG                           



                          zPAErdNhvGDuak8SsEJ                           



                          AzTCHzSkbuxqH5AIZRk                           



                          44tTQTsy9TgBTNafW7q                           



                          cTFiHHajemVmsQO2IYx                           



                          hdmUgYmVlbiBkZXZlbG                           



                          3gTZRfED1pPZAwetSwn                           



                          x4sleToQAFuWFLhU8Tb                           



                          cmlzdGljcyBkZXRlcm1                           



                          byiCkQNZ0APSVOT9OXT                           



                          ThEQRdg10wMZHnjTpdi                           



                          X0zpYWtgwAyAZEfp2Qc                           



                          tM5qoFFBWDQxI9vcGZ3                           



                          jNKefp3BzwUAbfALusA                           



                          C9LDNtc1CnOkErbsRxx                           



                          EEscKYmZ7SdqWceM6tm                           



                          ZEZgQMFjzuOarSQvq4H                           



                          dQRWiqYY6vTJgWG7GEl                           



                          XIe07xCOKsBLOSezDvM                           



                          HDduCsxlCL3xtN7sS9s                           



                          LiBJZiBhcHBsaWNhYmx                           



                          [id number]                           



                          QiCYGgibjck7EbAJNkA                           



                          BRelR29BJCqOAMfcj4m                           



                          dueebBMrwpKgE2Fmkgo                           



                          5dV1iOJDfUFadTBNdER                           



                          ZzMjJcbGFuZzEwMzNca                           



                          GljaFxmMVxkYmNoXGYx                           



                          XZogN2eeXvCgSbZdRel                           



                          wYXJ9                                  



MD Blackwell CT GUIDED BIOPSY MUSCLE/SOFT TISSUE [id number] 21:13:01Date 
of Procedure:  20  Attending Physician: Mj Chun MD Assistant:  None 
Pre Procedure Diagnosis:  Malignant neoplasm of cervix uteri, not otherwise 
specified   Post Procedure Diagnosis:  Unchanged  Indication:  Evaluate for 
metastasis Protocol Number:  None Title of Procedure:PercutaneousComputed 
Tomography-Guided Biopsy Operative Findings:  Percutaneous image-guided biopsy 
of 1.8cm left retroperitoneum lesion. Consent:  The procedure, risks, 
indications and alternatives were explained. All questions were answered and 
informed consent was obtained.    I have reviewed the history and physical 
dictated by the mid-level practitioner / fellow. Sedation/Anesthesia:  Moderate 
sedation for pain control and anxiety was administered by a dedicated nurse 
under my supervision.  There was continuous monitoring of oxygen saturation, 
heart rate and intermittent monitoring of blood pressure during the procedure.  
Medication given was midazolam and fentanyl.   I was present for the 
administrationof the medications indicated above.Procedure Events Event Event 
Time Sedation Start 2020  2:15 PM Sedation End 2020  2:41 PM   
Procedure in Detail:  A time out was performed prior to the start of the 
procedure and the correct patient, procedure, presence of consent, site, and 
side were confirmed with all members of the team. With the patient in the prone 
position, the skin overlying the area of interest was prepped and draped in the 
usual sterile fashion.  Lidocaine 1% was used for local anesthesia. Using a 
posterior approach under Computed Tomography image-guidance, a 17 gauge needle 
wasadvanced down to the left retroperitoneum. An image was obtained and placed 
into the medical record.Samples were obtained for evaluation.  Sampling: Core 
Biopsy:  An 18 gauge needle used to obtain samples for surgical pathology 
evaluation.  Total number of samples:   3 Specimens Disposition: Diagnostic 
Biopsy:  The biopsy samples were submitted to pathology. Additional Comments:  
This was an exceedingly difficult biopsy due to target location.  If repeat 
biopsy is necessary due to non-diagnostic result, recommend considering an 
anterior approach. Estimated Blood Loss:  Minimal Immediate Complications:  None
 Disposition:  PACU Plan: 1. No follow-up with Interventional Radiology 
required.MD [facility]yt[facility] Image-Guided FNA [id number] 17:49:00





             Test Item    Value        Reference Range Interpretation Comments

 

             Gross Description (test [id number]                        

   



             code = [id number]) eBcMrRJFGo4kzj684qL                           



                          PaCVxhGdEsWwO7hLJsA                           



                          BFiuVPkc7C1FMiicZPk                           



                          OgQOdvpnnJr5g6kcW2m                           



                          tib7dYA2nFgSwGUJvMP                           



                          QwXGZwcnEyIFRpbWVzI                           



                          A2dslYPe91bbrt0o5fj                           



                          BZfkeM3sCUVvQKEhpSD                           



                          ws2J4JCwixAElRGPLg4                           



                          SajHArHY3encc2h9fuG                           



                          Ndni1hyz6XcKrJgMKSy                           



                          ZXQwXGZwcnEyIEFyaWF                           



                          wHH0mfrCzjeb5p9hiWE                           



                          TbYf8qGYUkhbyfL4wsx                           



                          aHlqSKeWyUdeXEbW076                           



                          tvgeljt7u3oiHKEvEb1                           



                          nePfkA6sgfhAeaEAnWo                           



                          BycTIgVHJveSBNaWNyb                           



                          pBufJQ6xNscYyJnMALz                           



                          i83nzowsT4ofgbOnaAJ                           



                          wBaRtoCUaF5nqOt9tY1                           



                          78NQDaGWstj0cbq5VfY                           



                          mNoYXJzZXQwXGZwcnEy                           



                          EQWxS03wWFZSS720TUS                           



                          iTJYfNNIrl2chh5gtB5                           



                          hhcnNldDBcZnBycTIgQ                           



                          DQaNNt6rR5Ve7uqo2gx                           



                          luAxxTW2XJTlNKCcO0B                           



                          gFC7pAFDjwSGrM9whFE                           



                          EgAReglgCvuuU8CYPfl                           



                          GZfUJssffZdJiLwX9Sl                           



                          AQ1xNWzyiKEpFkK2PCA                           



                          vFRO7YKbkRHMoZFgpZp                           



                          o1DYI2K3sdRTP8Y3hyf                           



                          qOcumWnXTJasGJ2Uvpu                           



                          trNvZcgxV0WtDP98UZc                           



                          bkINrYnv9KLMcWAj1PD                           



                          dmLLKiKSOvVau4LSv3D                           



                          2hjDTTbREYpE7WyPO5b                           



                          SFLnAah8IYLrXAoamyP                           



                          mESW1ICwdGROqNOO6FO                           



                          BtlQViXxw9XLHcONP7O                           



                          4kltmQdanO8P0qxfFIk                           



                          DTLfP8mqOYVjCIqeM8Q                           



                          xOP4dWStaVss4GZX9PL                           



                          pfzeHqZNu0QWqcIXKsJ                           



                          Px8BTXpePOoInB7TOWx                           



                          XHA6BBekqdTrveJ2MJy                           



                          guUHzEQfbS4jpZSYoOR                           



                          usE1UeAT1lRCwmUde4Q                           



                          TIwNztccmVkMjIzXGdy                           



                          ZWVuMjIzXGJsdWUyMjM                           



                          7XHJlZDIzOVxncmVlbj                           



                          IoXEanpDSjKxB4P4lnY                           



                          TIyMUXuY5NdDF4vLJFw                           



                          Fxo7PCE1BBmmwyYmOBu                           



                          gtaUdzqCsDys5KYM5AX                           



                          l1Qjsdk7I3nDMtdCBqq                           



                          HtcczEgaGVhZGluZyAx                           



                          U614UPQaRIzhZYUqpyl                           



                          wOfe7f5jdRaGtCNLbtL                           



                          2gAWV6jMgmtoTpmBNuV                           



                          UvqTqX4B927KOQ3LSks                           



                          SRCsuvizXSd1b9vkIaE                           



                          eQRZbnY8zRUV1uH5EUe                           



                          iyMRCkpaueWIh3CNcxM                           



                          UVymedoVdC3DBzlAIUs                           



                          aSr6AFtoVCIerdT4Bht                           



                          tYXJndDcyMFxtYXJnYj                           



                          npUQzdDNOvLHO8TfMdP                           



                          ADxk9Volcz8BgFuHtJd                           



                          FYXLNqbjcHIkMDF3MDT                           



                          0ZjFcZXBpYzkxMDFcYW                           



                          0hvPyamOd6cSarNBXpr                           



                          oI0vOAdHOhky4cbZUD2                           



                          f1fenumgCDJpQPejRp7                           



                          udHRibHtcZjAgQXJpYW                           



                          q8nM30MZIusY8hjXQeB                           



                          PkqztLqHbX5PRuaWYPq                           



                          XrX6QJLkiHFzPXL3nRx                           



                          wYXJkXHBsYWluXGZzMj                           



                          ZvK6GkZ8rwLD1uXNBqw                           



                          6C1ubXiHpbbNWGfQyKF                           



                          gXJcWOE1qHi5LOFjXYY                           



                          uNDJ4IKxpAAEgnWOrw7                           



                          xwYXIgMTAgbWwsIFxwc                           



                          y62DHB0ZVDsz4PrF9Qp                           



                          JRCqv9AovMAiqYNlpMG                           



                          jdDBccHJvdGVjdDAgYm                           



                          irl9Z6GWLtn1EsS9GyO                           



                          PqwdGKvngwngn62GAQ4                           



                          MFxmczIwICBmbHVpZFx                           



                          cjj99XLM2ZKOsvYcgxR                           



                          1sWdVbYYIiyF9nZdKMT                           



                          YuxSGQuGYGVkYMxd8Nt                           



                          blxwYXJccGFyXHBhciB                           



                          ReRosZuApX1ayHzIhoZ                           



                          SxUn76HUupOq83CLdpS                           



                          E51LYQvMEEuolpybWZx                           



                          mCJ7ODNEnK3nEBxgeNK                           



                          gYXNzZXNzbWVudCBmb3                           



                          Svz2DpA7pkBD6fHNUhe                           



                          AFrD0jxu9ZgWV0lRWYj                           



                          DTUih6VuZ1JkmDMcwUV                           



                          jdDAgeDFccHJvdGVjdD                           



                          AgXHBsYWluXGZzMjAgI                           



                          QO6VRYjZmYBrQ8ueCgi                           



                          EIQjMIXody6bnSBvcVp                           



                          9                                      

 

             Immediate Assessment (test Adequate                               



             code = 9837) cellularity, favor                           



                          benign                                 

 

             Major Classification (test NFMC/benign                            



             code = 9839)                                        

 

             Diagnosis (test code = 34) d2kycKXwTFDyyZG9HMO                     

      



                          kAEPzv6sxm0LsvACrtD                           



                          UpZFvfdMRfwnJhzl34t                           



                          KT9dG09ZA1dXSGpGwW8                           



                          FOAyxgW0Zbt7NMGbWZL                           



                          vaBIlB783w4chp5jsmk                           



                          UhxKB6gEplZWMaFLJpC                           



                          LtdNTYxZqVtQZ2pJNf9                           



                          pl6pYPwmkZ2xYLIhm0S                           



                          ignfloJNrmB4tKKauDp                           



                          luZSBuZWVkbGUgYXNwa                           



                          XJhdGlvbjpccGFyXHRh                           



                          EcieHPBufJn1EkBvdQm                           



                          uNzIwIENvbGxvaWQgbm                           



                          2txYmcSRsgyHuuA6tdw                           



                          RgtUODdG0SoZVGgpMkg                           



                          blxwYXJ9                               

 

             Retained/Biomarker Testing o7tzqHXcFFMmeAV7ZJY                     

      



             (test code = 9838) jMYCib3usy2QvuEQneB                           



                          PzLFhcjQCsftVqsl47r                           



                          HT0wK33FF1uBEXxPaJ5                           



                          FLCpqqM3Gxk1ZWGzIZD                           



                          juSCzK980b3gka9olwg                           



                          QwcAE3cQveHJZmGDKiD                           



                          SfgXPUjWxDgY8D8QIqz                           



                          N5odWHU8                               

 

             Informational Points (test m7yldHCyWLGpzEBgYzN                     

      



             code = 9836) rUBFdUNAdv4fkEAZioF                           



                          FuZzEwMzNcZnRuYmpcd                           



                          TKuKKRkCeBwg3kqg400                           



                          zFCeh8baJJJvQwK1dHQ                           



                          xRKTjvEGcS488WWKdKQ                           



                          lol9sjh9JjZWLrvDBcm                           



                          6Y1GNJFHBcfFNZRDUb1                           



                          u1ubCcXyMhL6rYRmONf                           



                          eS7phhsQxzBExGXVbFR                           



                          n3vN14VAYxyK0lzLMzJ                           



                          UhslgElXrX6ZYecOFDo                           



                          TkH7ODMxaNLmSCZlQ0g                           



                          yZWQwXGdyZWVuMFxibH                           



                          FcMDV4pWijh7K4rLBfx                           



                          GVldHtcZjBcZnMyMiBO                           



                          o1MaUPk9hWljK0EvABX                           



                          yPkK2aUZnWIVqNLhsIU                           



                          OxOFYmmrG5oA43IZuev                           



                          qJ7oWZbl1Cyj71qe283                           



                          iC2leDQxICS6UMMcKCS                           



                          qpCYhXBTxWBV3ZMXamJ                           



                          WoM8kmZQKlHM5dxzcvX                           



                          WogIDoxNZKraUD4HAKg                           



                          aGAuO3QvWKStRWwsOKB                           



                          ugle0JfNpBc3dmAChrI                           



                          tsMRyfd6mdo0mzcADcD                           



                          fs9OUIsRiMhTstwRZic                           



                          h4Wum7gdVOWifh0kUEB                           



                          7rXNwqUfns3F1eCKmOP                           



                          XjoVWgiwEkOVHiJwG7U                           



                          RjtHZ6lph36KFBgGKN7                           



                          hy8feYRfbTooyaPfeVD                           



                          aJNxfJ0OoNHHtx428FN                           



                          AcV3TfGQLku2Z8jiKuI                           



                          qGnWXGypCA0xfO0MGXz                           



                          OBr6fCRngmQ6pmHuiML                           



                          tL1flhS5wNLXkIT5axg                           



                          jzh3dnKAbhKNnxJISft                           



                          JL4urQ0UPOgpDSwZ2Fa                           



                          vZ3iXGIrGSffUGUaczb                           



                          5FaZhTo2wfFLtaCraHP                           



                          xzYmtwYWdlXHBnbmNvb                           



                          nRccGduZGVjXHBsYWlu                           



                          XHBsYWluXGYwXGZzMjR                           



                          yrVdlsJrlgI2lGbYsVg                           



                          NbTWxaEB9pYIXjT8vhd                           



                          UZsEHYbVMDxY2shOgEd                           



                          jD6npTmyFTvfkqN6QQl                           



                          kA95gEAN9SRI8jpOuOO                           



                          SjqmXsBDLyYISbWC9qo                           



                          NMbBDXqERSaLA8gFUT1                           



                          ZWxvcGVkIGFuZCBwZXJ                           



                          uq8VrNT8jPVEhxWNuJJ                           



                          U5UPWdj1GjS3PkXSO5K                           



                          BSfpN8iVOQsuWVDQSMK                           



                          RCBBbmRlcnNvbiBQYXR                           



                          bm7ksO8nhCW0qREdkTp                           



                          9yYXRvcnkgTWVkaWNpb                           



                          tGcBSMnTLCgIAImi4Az                           



                          MCtzhnCzpm76LLRiAS1                           



                          tf9FwA8qffNOtnYd8TR                           



                          QpTWOcWIDim3YhRKJiu                           



                          j69DAXaKqdmsNgjIUSe                           



                          Dw9xCw2rUHIyzdHhBFM                           



                          5EtVKQR8vywmnnRMjjL                           



                          kfby7fAXYgDGdfHVJpS                           



                          GZzMjJcbGFuZzEwMzNc                           



                          aGljaFxmMlxkYmNoXGY                           



                          gITmzK6yaHcMuZbWxEm                           



                          xwYXJ9                                 



MD Antonio HEAD NECK SOFT [id number] 17:36:34 1. Percutaneous 
Ultrasound-Guided Biopsy of a 3.5 cm right inferior pole thyroid nodule.  2. 
Enlarged bilateral multinodular thyroid with no one nodule showing specific 
internal features to suggest malignancy. 3. Immediate cytologic assessment on 
2020 of the biopsied right thyroid nodule is negative for malignancy with 
findings favoring colloid nodule.. Final cytologic report is pending to be fo
[place].  Interface, Radiology Results In - 2020 11:38 AM CSTFULL 
RESULT:Examination: US HEAD NECK SOFT TISSUE, US FINE NEEDLE ASPIRATION on 
2020 10:38 AMClinical History: Multinodular thyroid.Indication: 
Multinodular thyroid with a 3.5 cm nodule requiring FNA.Comparison: 
None.Technique: Real-time grayscale and power Doppler ultrasound of the 
neck.Findings: Ultrasound shows an enlarged right thyroid lobe that measures 5.9
 x 2.3 x 3 cm. Within the inferior pole of the right thyroid, there is a 3.5 x 
1.8 x 2.9 cm nodule. The nodule is predominantly isoechoic to adjacent thyroid 
with regardsto the soft tissue, although shows several large internal cysts. No 
suspicious findings.A nodule in the right superior pole measures up to 1.6 cm 
and shows comet tail artifact likely related to colloid. A right mid nodule 
measures up to 1.3 cm and is mixed cystic and solid with no internal suspicious 
features.The left lobe measures 5.1 x 1.4 x 1.5 cm with several nodules all of 
which measure under 1 cm without suspicious appearing features.No 
lymphadenopathy.The 3.5 cm right inferior nodule was selected for targeted 
FNA.Ultrasound-guided fine-needle aspiration of the 3.5 cm right-sided thyroid 
nodule:Risks and benefits of procedure were explained to patient. Informed 
consent was obtained. The right neck was cleansed with alcohol. 1% Xylocaine was
 used for local anesthesia.Ultrasound-guided fine needle aspiration of the 3.5 
cm right thyroid nodule was performed using a 20-gauge needle under sonographic 
guidance. One pass was made. A second pass was made with a 25-gauge 
needle.Immediate cytologicassessment:Adequate cellularityPreliminary cytology 
result is negative for malignancy and final results are pending. Estimated Blood
 Loss: None.Immediate Complications: There were no immediate complications, and 
the patient was discharged in stable condition.IMPRESSION:1. Percutaneous 
Ultrasound-Guided Biopsy of a 3.5 cm right inferior pole thyroid nodule. 2. 
Enlarged bilateral multinodular thyroid with no one nodule showing specific 
internal features to suggest malignancy.3. Immediate cytologic assessment on 
2020 of the biopsied right thyroid nodule is negative for malignancy with 
findings favoring colloid nodule.. Final cytologic report is pending to be 
followed.MD Antonio Fine Needle [id number] 17:36:34 1. Percutaneous
 Ultrasound-Guided Biopsy of a 3.5 cm right inferior pole thyroid nodule.  2. 
Enlarged bilateral multinodular thyroid with no one nodule showing specific 
internal features to suggest malignancy. 3. Immediate cytologic assessment on 
2020 of the biopsied right thyroid nodule is negative for malignancy with 
findings favoring colloid nodule.. Final cytologic report is pending to be fo
llowed.  Interface, Radiology Results In - 2020 11:38 AM CSTFULL 
RESULT:Examination: US HEAD NECK SOFT TISSUE, US FINE NEEDLE ASPIRATION on 
2020 10:38 AMClinical History: Multinodular thyroid.Indication: 
Multinodular thyroid with a 3.5 cm nodule requiring FNA.Comparison: 
None.Technique: Real-time grayscale and power Doppler ultrasound of the 
neck.Findings: Ultrasound shows an enlarged right thyroid lobe that measures 5.9
 x 2.3 x 3 cm. Within the inferior pole of the right thyroid, there is a 3.5 x 
1.8 x 2.9 cm nodule. The nodule is predominantly isoechoic to adjacent thyroid 
with regardsto the soft tissue, although shows several large internal cysts. No 
suspicious findings.A nodule in the right superior pole measures up to 1.6 cm 
and shows comet tail artifact likely related to colloid. A right mid nodule 
measures up to 1.3 cm and is mixed cystic and solid with no internal suspicious 
features.The left lobe measures 5.1 x 1.4 x 1.5 cm with several nodules all of 
which measure under 1 cm without suspicious appearing features.No 
lymphadenopathy.The 3.5 cm right inferior nodule was selected for targeted 
FNA.Ultrasound-guided fine-needle aspiration of the 3.5 cm right-sided thyroid 
nodule:Risks and benefits of procedure were explained to patient. Informed 
consent was obtained. The right neck was cleansed with alcohol. 1% Xylocaine was
 used for local anesthesia.Ultrasound-guided fine needle aspiration of the 3.5 
cm right thyroid nodule was performed using a 20-gauge needle under sonographic 
guidance. One pass was made. A second pass was made with a 25-gauge 
needle.Immediate cytologicassessment:Adequate cellularityPreliminary cytology 
result is negative for malignancy and final results are pending. Estimated Blood
 Loss: None.Immediate Complications: There were no immediate complications, and 
the patient was discharged in stable condition.IMPRESSION:1. Percutaneous 
Ultrasound-Guided Biopsy of a 3.5 cm right inferior pole thyroid nodule. 2. 
Enlarged bilateral multinodular thyroid with no one nodule showing specific 
internal features to suggest malignancy.3. Immediate cytologic assessment on 
2020 of the biopsied right thyroid nodule is negative for malignancy with 
findings favoring colloid nodule.. Final cytologic report is pending to be 
followed.[facility] with and without Contrast - [id number] 
06:28:111. Cervical mass with suspicion for left parametrial involvement and 
left parametrial metastatic lymphadenopathy.2. Left common iliac metastatic 
lymphadenopathy and nonspecific prominent bilateral external iliac lymph 
nodes.3. Enlarged uterus secondary to multiple fibroids.4. Thickening and cystic
 changes of the junctional zone suggestive of adenomyosis.5. Thickening of 
endometrial stripe consistent with biopsy-proven atypical endometrial 
hyperplasia.Interface, Radiology Results In - 2020  1:31 AM CDTFULL 
RESULT:Examination: MRI PELVIS W WO CONTRAST - GYN, 10/30/2020 4:36 PM.Clinical 
History:Cervical cancerIndication: Initial staging, Malignant neoplasm of 
endometrium, cervix cancer, surgical planning and treatment decision; rule out 
any other pelvic disease or invasionComparison: Pelvic ultrasound on 
2020Technique: Multiplanar multisequence magnetic resonance imaging of 
pelvis was performed with and without intravenous administration of 
contrast.Findings:Cervix: The cervical mass measuring 2.9 x 2.1 cm concerning 
for primary cervical cancer. A 1.1 x 1.3 cm lesion in left parametrial (4, image
 31) with decreased signal on ADC images (9, image 31) likely a parametrial 
metastatic lymph node. Suspect left parametrial involvement (4, image 
31).Uterus: The uterus is enlarged secondaryto multiple fibroids and measures 
12.3 x 6.1 x 10 cm. The largest subserosal fibroid measures 5.1 x 6.1 cm (4, 
image 21). Another fibroid in the fundus (4, image 28) measures 3.6 x 3.2 cm. 
Another fibroid (4, image 30) measures 3.4 x 3.1 cm. Thickening and cystic 
changes of the junction of zone measuring up to 1.8 cm (3, image 20) suggestive 
of adenomyosis. The endometrial stripe is thickened and measures 1.2 cm. C-
section scar noted.Ovaries: Ovaries contain multiple follicles. The left ovary 
measures 4.3 x 2 cm (series 4, image 24) containing a 2.8 x 1.9 cm simple cyst 
(4, image 22). Right ovary measures 3.1 x 2.5 cm and contains a few cysts and a 
corpus luteal cyst measuring 1.6 x 1.2 cm (13, image 54).Lymph Nodes:Enlarged 
left common iliac lymph node (series 4, image 9) in medial aspect of theleft 
psoas muscle measuring 1.5 x 1.8 cm concerning for metastatic disease. 
Nonspecific left external iliac lymph node (4, image 22) measures 0.8 x 0.9 cm. 
Nonspecific right common iliac lymph node (series 4, image 10) measures 0.8 x 1 
cm.Vessels:Patent.Bowel:No bowel wall thickening. Bladder:No bladder wall 
thickening. Musculoskeletal/Soft tissues:No aggressive osseous lesion. No soft 
tissue mass.IMPRESSION:1. Cervical mass with suspicion for left parametrial 
involvement and left parametrial metastatic lymphadenopathy.2. Left common iliac
 metastatic lymphadenopathy and nonspecific prominent bilateral external iliac 
lymph nodes.3. Enlarged uterus secondary to multiple fibroids.4. Thickening and 
cystic changes of the junctional zone suggestive of adenomyosis.5. Thickening of
 endometrial stripe consistent with biopsy-proven atypical endometrial 
hyperplasia.[facility] [id number] 19:13:00





             Test Item    Value        Reference Range Interpretation Comments

 

             Materials Received (test [id number]                      

     



             code = 9973) NSLdFRAfe2oiOOVwdDAe                           



                          ZzEwMzNcZnRuYmpcdWMx                           



                          QQBhIeCgj7iyi256rBUm                           



                          x1xhYJDrHdZ5sPXkPKMh                           



                          uUOnK392GRJcYCawf7tx                           



                          x9EvOQIlfAWfl4V9YXZI                           



                          xqkqwLv1rLqfX00is3A5                           



                          LlqaC7ljYUDySZYaF4Xe                           



                          RB1nOOEhNce1NLI1AKI8                           



                          FWMgEGApW5BtUE6uNODy                           



                          iZGmBTy5a7klfJeiLNEj                           



                          XJL6m6jsOZdmtzFoCS0b                           



                          pf7jeAj0q9jewnPwMVSj                           



                          KVKgxLSGOOJrB0AklJeh                           



                          Lc5izBd3cThiLhdbFVC9                           



                          Okq6SL5ssp00myt1iTsj                           



                          WFPiuesrFfN5TQftLDQr                           



                          azxqBTx1LHklIQTqrUzm                           



                          MFxtYXJncjcyMFxtYXJn                           



                          fWS9EFLipXReB4BfWHVj                           



                          KGszWSUgpfg2CdAnBv7n                           



                          cWCqmNsnYIcpj4flh9vs                           



                          fADrOmg5YDBtFbFhRztj                           



                          RJsfj8Uks2koIYKwzv0g                           



                          JQF4gUQazDiku7J8uAMx                           



                          JFYrzSEtmiEuWSZfqg12                           



                          oFMglOUghYExfu5oicLc                           



                          eEOnwTInVZL7vPAckePv                           



                          OJDfbIFgFUOzMQ0ciSIm                           



                          UFEvaG8ubtimIQVtGoXw                           



                          sowhAXZtkCwjkhJwOc3n                           



                          lEaoQRT8TGelG6ewkT8a                           



                          OtG8HAajN6veiO9tNHn1                           



                          MMoemGG5FXAhoJ3cKY9i                           



                          olgyo3mwEnFgEA1jqhyu                           



                          w0maXtHeTU8jcwg9y8va                           



                          FXZ3GUvyEQFuVmO4ntQ3                           



                          NDBcaGVhZGVyeTcyMFxm                           



                          m202QIF7MpAnVADqb1Cz                           



                          L4DsjBwhG39ndHbeO24y                           



                          JYYjxBndwS3ipJdsnH1d                           



                          VyDmBlMdSQe1up80YZm4                           



                          nlhpeRmuZLe9bgSsYHVd                           



                          LBZ0NXAadWUjSJSuV6c3                           



                          saFeEBHuMHB2NXPbdSHs                           



                          CLXqW3p7xkIlBLI9ZTz7                           



                          cnBhZGRmdDNcdHJwYWRk                           



                          YjBcdHJwYWRkZmIzXHRy                           



                          fZYtaKZgbHTcpO7isUme                           



                          HIPxbGXvwY1cVCG5JHHn                           



                          cmgzMjBcdHJoZHJcbHRy                           



                          mn99FMImitDnnZIfpIgq                           



                          lBLxLVT6ISXbAACtHWNj                           



                          HLW0RCUlKvPukpAxFSla                           



                          bGJyZHJiXGJyZHJzXGJy                           



                          JNT6WUYwZdQelfZlPPel                           



                          bGJyZHJsXGJyZHJzXGJy                           



                          ENT4DIGwKnYoojTqSFzl                           



                          bGJyZHJyXGJyZHJzXGJy                           



                          SQQ4HRIpPlZfxwUzLDxj                           



                          bHBhZHQxMFxjbHBhZGZ0                           



                          Y3allYFjWVRwRBbabPVc                           



                          BBNiF0ijwJUoZXkjZVXz                           



                          cGFkZmwzXGNscGFkYjBc                           



                          E4znYAHnTwUxG4CjhDb2                           



                          MDAwXGNsdmVydGFsdFxj                           



                          wPJpNLW1VGBwUWIySDTk                           



                          KAO6FDOvKkOrgsQyXHct                           



                          bGJyZHJiXGJyZHJzXGJy                           



                          JCS1CFYuYuMtpfJaJKrf                           



                          bGJyZHJsXGJyZHJzXGJy                           



                          NNM6QJUmKiOmckPoRKnk                           



                          bGJyZHJyXGJyZHJzXGJy                           



                          KOZ1OZCjUeYcfwUvYStv                           



                          bHBhZHQxMFxjbHBhZGZ0                           



                          L6oteUIgPCYfXDvwzGBo                           



                          EGVsH6fgeXMtADsmREHs                           



                          cGFkZmwzXGNscGFkYjBc                           



                          C1ylHCCbEqEtA6ZjbUz5                           



                          NjAwXGNsdmVydGFsdFxj                           



                          iTKkYPC4HSZeULMbVTCc                           



                          TSX9GIHiPlFszqXiAGlz                           



                          bGJyZHJiXGJyZHJzXGJy                           



                          YXJ4JVRvIlFdibCdZDps                           



                          bGJyZHJsXGJyZHJzXGJy                           



                          IGF6BWNbHcJxahQeBPqx                           



                          bGJyZHJyXGJyZHJzXGJy                           



                          QHD5WIQfFtOspiNpGAuv                           



                          bHBhZHQxMFxjbHBhZGZ0                           



                          I0sydXLdMEIcTJyepALx                           



                          QVAnP3gwqOReQVoxATCv                           



                          cGFkZmwzXGNscGFkYjBc                           



                          X3quJYVcPgUjK1LqvKj9                           



                          TjTdOXFckcQhhK13Uxcb                           



                          h1NsYLCrYLJ7RRflYRij                           



                          bFxwbGFpblxmMVxmczIw                           



                          RFkjksnuMECcJLrnA9aj                           



                          QaRpOIPshQqhCWbwm0Uo                           



                          XGYxXGNmMlxmczIwXGIg                           



                          OBLsLBBghQ4kPplcC6Th                           



                          xH7xDNfnAkkbP0llGXAf                           



                          g1AkyI8jLLxdtJFmfyyw                           



                          MVxmczIwXGxhbmcxMDMz                           



                          MTylX7azDnPuOSRjbLla                           



                          DQcrg6EbMPCfCUHhAfrn                           



                          onPdTWm3zvMaVALxiOrl                           



                          sHSjHFreeaZqjEcsq2Cx                           



                          kyFgdVdeNKFvPRd7osVr                           



                          orjufAd9qACniCpbDVAq                           



                          tXszpM0rLgTuNnGkAGqg                           



                          bGFpblxmMVxmczIwXGxh                           



                          ybpkOWFpWRbwA9krGuTy                           



                          IBOtoAzuNUurl3LiJZPj                           



                          TCDvRnhxivCnHFRnU71r                           



                          bGVjdGVkXHBsYWluXGYx                           



                          XGZzMjBcbGFuZzEwMzNc                           



                          aGljaFxmMVxkYmNoXGYx                           



                          IGwjY5cyUgDaI1BkMPQu                           



                          UgOyvDFvW8ffR6FdhZjo                           



                          YXJkXGludGJsXHNzcGFy                           



                          CTJ1iPAfvlLppMFpzJMp                           



                          PBKhJNywSFJ9sGVwfrod                           



                          vOApdyhkRLndmoU1NQDd                           



                          YWluXGYxXGZzMjBcbGFu                           



                          ZzEwMzNcaGljaFxmMVxk                           



                          VtImAAKpILscF4agSnCa                           



                          F7OmMBWaIxQiQrBCLGYz                           



                          aXZlZFxwbGFpblxmMVxm                           



                          czIwXGxhbmcxMDMzXGhp                           



                          S7keJeCdORGvsFxcPAww                           



                          t5VrFZNqHLDjBsfkhiRe                           



                          TZb8gkGoDFSqvChokS91                           



                          Kqkucg48URKwz5vsVLSp                           



                          S3TlpMOaNECihCEjRMcd                           



                          MDhcdHJwYWRkZmwzXHRy                           



                          cGFkZHIxMDhcdHJwYWRk                           



                          ZnIzXHRycGFkZHQwXHRy                           



                          mDLeLRL1I4f6voDoKKOz                           



                          ZLb1raQoPYNlKgBwmWVd                           



                          AWY3RYf4RgceogQ5sLBk                           



                          N9r5KufkskIlHGcueMEp                           



                          wz54YNRglqLisQCkhVmz                           



                          uIPtEIG9XIKgMHYdRWQk                           



                          NWE9RKWaHvVpilVkIVan                           



                          bGJyZHJiXGJyZHJzXGJy                           



                          QNB6MINvZxBrxpHlSAgk                           



                          bGJyZHJsXGJyZHJzXGJy                           



                          QDB4JBOfObUvrjTnYLdw                           



                          bGJyZHJyXGJyZHJzXGJy                           



                          YTC2XDEgIcIyuoAnMVqv                           



                          bHBhZHQxMFxjbHBhZGZ0                           



                          T6imgNPzIPVvAOhgvRAc                           



                          ZBQhX8zecEYmPBmoAKTh                           



                          cGFkZmwzXGNscGFkYjBc                           



                          C8vrLWGcPlCvM3PtvEm6                           



                          MDAwXGNsdmVydGFsdFxj                           



                          fGFkDXB7EBXiJMRaYYZg                           



                          SRI6NSOiWlMpbaAkZSam                           



                          bGJyZHJiXGJyZHJzXGJy                           



                          VFB2WOZoMyCabpNvOCfr                           



                          bGJyZHJsXGJyZHJzXGJy                           



                          KEK4LFCxUoFhiqOnZBjw                           



                          bGJyZHJyXGJyZHJzXGJy                           



                          RPH2VSOeYiZsugDhYEhz                           



                          bHBhZHQxMFxjbHBhZGZ0                           



                          E0mbzVKcYAQxNVwfoVMm                           



                          ZTAgG3jjeKZeKYdbGDBm                           



                          cGFkZmwzXGNscGFkYjBc                           



                          Z8epERQgQoXuG9BviXb7                           



                          NjAwXGNsdmVydGFsdFxj                           



                          aFKdHTA0UEUtXADnHAKw                           



                          KON1SIQtLaLfmiWuOOlg                           



                          bGJyZHJiXGJyZHJzXGJy                           



                          GTN6QJVdQzKruxExDEre                           



                          bGJyZHJsXGJyZHJzXGJy                           



                          YOQ5NGNoOlPzuoKbPEca                           



                          bGJyZHJyXGJyZHJzXGJy                           



                          FBD1GJYwLeVyaaKsTKda                           



                          bHBhZHQxMFxjbHBhZGZ0                           



                          X7jvoXIwCHThBCqzwLBf                           



                          MQNsC5rybHXmNZqpRGHj                           



                          cGFkZmwzXGNscGFkYjBc                           



                          Y4zcBQXbGdBzM5CwwOi9                           



                          HaBgRHYtjaCmlJ78Xdkt                           



                          n5BwQBCpRWN2IUtdPOvj                           



                          bFxwbGFpblxmMFxmczI0                           



                          XHBsYWluXGYxXGZzMjBc                           



                          bGFuZzEwMzNcaGljaFxm                           



                          ODfuPcQaSKAmNBeaE0zl                           



                          QuObB4LxMDDsXmGdHZ8n                           



                          LuG6HYTqYuV0DKA8ELIQ                           



                          TYCwIPNAA3UKMQJoQIZH                           



                          Q4lhvVCmxucaYAedhiZu                           



                          ALkbedtwKXDuOLfgN0it                           



                          YbJzAGDgcCfhRCjlp0Zp                           



                          XGYxXGNmMlxmczIwXGx0                           



                          cmNoXGNlbGxccGFyZFxp                           



                          qmYqwVvpl0WejcBbkYxm                           



                          XHMwXHFsXHBsYWluXGYw                           



                          HZNcUvKkeAdloM0oWmRw                           



                          OdHzUArwHE8jALBwN4kd                           



                          dHRxBSLoBVJcR6cjNkDr                           



                          rH8ucGdvDMtwXuRfXjLh                           



                          AVNiOL9oUo6vZILzKTDd                           



                          YWluXGYxXGZzMjBcbGFu                           



                          ZzEwMzNcaGljaFxmMVxk                           



                          GdLsFPNfLHqvH5fcCoUz                           



                          H9GsZOYnTwVzvBUyV8jk                           



                          M9BzxOhdUKAaZZhwaQLq                           



                          LMJykBNkVLT9uPWahqHb                           



                          vZxzhXhiiN7hDsHlHiSs                           



                          NFxwbGFpblxmMVxmczIw                           



                          LIsoygcoHJJnRUaoQ8qa                           



                          YhHrCUZpiKjvRMhgk4Vt                           



                          XGYxXGNmMlxmczIwIDEw                           



                          OgJ0RhTrAeNdcFommQ7t                           



                          RrQcLhLyGJvcXC7oBMEz                           



                          H4azeVRxOBGhSHWpX0qt                           



                          YqDyrI9srWmdDChdJhXa                           



                          ZnMyMFxsdHJjaFxjZWxs                           



                          XGvtsXVbIHHml6azVKCh                           



                          ZDNkoNSuFHY3jFIascFz                           



                          eZpsnGcnxI6zOvCqFaAb                           



                          NFxwbGFpblxmMVxmczIw                           



                          YXidizqcANGsSDaaX7vu                           



                          YgMoVWZhlRpaYMztd1Pz                           



                          XGYxXGZzMjBccGFyfQ==                           

 

             Diagnosis (test code = w0fsaHPpLKVtxPN5IZAy                        

   



             34)          WDJom6vxw4HnoATxjMZm                           



                          PBdnlRXrzlNkqb36mBI1                           



                          iF96HS4rGSRiCrM8NPDc                           



                          ojY8Xqv5XVDxWDKpfWZg                           



                          D634n9fme8galiZwdUJ5                           



                          fVxwYXJkXHBsYWluXGZz                           



                          WgNaAy44dbJvgERreVTl                           



                          DISwfZAyxvMpOvY8ZNVx                           



                          DmM5FQQkYO8uPu0mXWUb                           



                          XAGmZDJxJ93uuROySHIn                           



                          CYYiwHzer1L2DNVszeio                           



                          YXJccGFyXHRhYiBDZXJ2                           



                          eQndCKJbo3GpmJCtULe5                           



                          XHBhclxwYXJcdGFiXHRh                           



                          XkUUMcLYM7nXYYZsMF6G                           



                          UkxZIERJRkZFUkVOVElB                           



                          CARPDENGIDIXTQ3WOmGL                           



                          TPnFDXDSDgPJAv7TSPWD                           



                          TkZJTFRSQVRJTkcgVEhF                           



                          ABRRLRnGIVRPNMsAE75M                           



                          T1FsM2UkTPnNWPWCA9EX                           



                          UKcpJoEWJC5byEXfWKQh                           



                          xqt8SCTdYR0uz24koMTj                           



                          aU5vQPP4myA0hKyvH7D7                           



                          XHBhclxwYXJcbGkxNDQw                           



                          RYpfmfB1BGRxLPQEEUwN                           



                          FFhuSO8WK55XNVMHFNtt                           



                          NImZMRHEBBMOMPOoU2cG                           



                          SCBQQVBJTExBUlkgRkVB                           



                          NGCLINKwJFOCK5QJBNwt                           



                          Dd3ZIQLBLJ4LAY1GCKCR                           



                          MF4OA2RBN8xMI99EWHtH                           



                          QURFIDEsIElOIEEgQkFD                           



                          N3jBY8MMUMTEBgYKTKHY                           



                          NOPNFbrdFB9YU99HNMRP                           



                          BR7bSZKdusuyoYUngWmz                           



                          MFxwYXJcdGFiXHRhYlxw                           



                          YXJccGFyfQ==                           

 

             Disclaimer (test code = [id number]                       

    



             [id number])        KIKiGUZeh3xkSEVpoSWp                           



                          ZzEwMzNcZnRuYmpcdWMx                           



                          QPXyKuLxh3oaj915uMFb                           



                          a9yeUUWmGvG3iTInDRFh                           



                          xVFsN013WSUoRMdxn1ik                           



                          j5PoCYZqjQGxu4W5TWGM                           



                          votekSp2qQipP95oo6L0                           



                          YxcaI9gbEDUaSHVcZ5Tb                           



                          MN4nUBBvYew0BEC7LJB6                           



                          IWIzGILvS6AlXS4aJYJm                           



                          rVOiYBk8i2ralAffCXRm                           



                          UQI3r7jpDLfrajJtYB6t                           



                          mc5ojXs3v2mvviYgCRJl                           



                          DTCndDCCQFBuP0LnvXuf                           



                          Is6mrMv7fMczWklcYTR4                           



                          Oqk2QG3iwk71inp2lWjo                           



                          NYZiumxrYjT7QYcrSATh                           



                          jqhdAZl3SXgnNIPdiUU5                           



                          YWZxuRIfH0KyDYFaXJ3h                           



                          qjw9CBB2EGqaDSAjDzA5                           



                          NDBcaGVhZGVyeTcyMFxm                           



                          b812RLW0YmPfQD9hB3Kg                           



                          g3D8pK2ajYWiLGYpsDPr                           



                          NjHyVZMuch5oiEQhWJkv                           



                          x5WeJQT7pgA2aBWfrDJm                           



                          LDEzBX91Vwkut5SlZtsx                           



                          KBW3WQNgjgAgp4Jma6vf                           



                          BpTamvHpE6ksR0CtZVYb                           



                          XLBnURLcQdOmxmWcc9Bv                           



                          b4LrkPMpmZl2w2zrUPGx                           



                          LFWqpZqfj2tiGRU6ZZYq                           



                          L6L6uCTfw0ksBSmbALSp                           



                          sOA0rfH6LJRxgVGlV1Ae                           



                          eH5mPRFtAW3nrcq8b6by                           



                          AIQ8VDibBFOfDvM0tiH2                           



                          NDBcaGVhZGVyeTcyMFxm                           



                          p115OSL8QkCwTWKov2Uw                           



                          T6RfwDcjD64owAagN69e                           



                          TQZbyIwhlG8yrEwxrB4j                           



                          ZjBcZnMyNFxxbFxwbGFp                           



                          qpaeNOhedjV9HOzfxxuy                           



                          BMXzXKfmA1yoAdChAZTh                           



                          oOqzUCyxn9SgIEHvXSUj                           



                          OeagozQ6KFYHg13dNYZr                           



                          s9NjDXMrnH0ihEWcDRmg                           



                          mhDijFJ5NHqxtxTsSkGw                           



                          ppZrALFtwF0kGYMzUQ5s                           



                          MOKxrzPhgq1aoaGqEGHq                           



                          YVNeK9JgsuclpSljgcPz                           



                          OKRpyw3guyJrXVU0VGQN                           



                          IJ7HBQIzTTWex70vAAJb                           



                          vLagnN6ikCEokzSfQVKi                           



                          b9MvoL9kfVZMCQUmS5yd                           



                          HR5sXNnqw6XumJCczEWb                           



                          pMQ6RAZhs2TgNpFulfCc                           



                          hCPasAPoS0EspHqiL5zt                           



                          JXCnEVOfuwLtrCXxn4Lx                           



                          FUPfqQF9jQAaBJ5WYzIN                           



                          m96kVCFfCEUUuhZjOFYt                           



                          dIpabAH9jsO0uW0dIbPL                           



                          ZiBhcHBsaWNhYmxlLCBj                           



                          g538qh1zjpN7SWSzFLZq                           



                          xqyki4YvLFBpPSRqaQ23                           



                          NIDoYZEmdl4bhbzeiZYl                           



                          syGoP1Mmdlj1gI7tARZe                           



                          YWluXGYxXGZzMjJcbGFu                           



                          ZzEwMzNcaGljaFxmMVxk                           



                          QqCaTRFlSUwzN1pgHzXu                           



                          ZnMyMlxwYXJ9                           



MD BlackwellOSI US Pelvic2020-10-28 16:48:22Study acquired at another 
institution. For comparison only. No MD Blackwell originated interpretation
requested or available.MD BlackwellCT Chest with Contrast2020-10-28 16:15:47*  
Small bilateral pulmonary nodules are nonspecific and can be followed.*  
Multiple small low-density nodules in the thyroid. Further evaluation with 
ultrasound can be performed if not previously evaluated. I personally reviewed 
these image(s) along with the resident's/fellow's interpretations, certify that 
if a procedure was performed I was physically present, and agree with the final 
report.Interface, Radiology Results In - 10/28/2020 11:17 AM CDTFULL 
RESULT:Examination: CT CHEST W CONTRAST, 10/28/2020 7:24 AMClinical History: 
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 
grade 1 endometrial cancer Indication: Initial stagingComparison: NoneTechnique:
 CT of the chest was performed using intravenous contrast.FINDINGS: Lines and 
Tubes: NoneLungs and Airways: There is a 5 mm subpleural nodule in the right 
upper lobe (3/30). Additional small (3mm or less) pulmonary nodules are seen 
series 3, images 53, 74, 87. No focal consolidations or evidence of pulmonary 
edema.The central airways are patent.Pleura: No pleural effusion. No 
pneumothorax.Cardiovascular: The pulmonary arteries are unremarkable. The heart 
is normal in size. There is no pericardial effusion. Thereare no aortic arch or 
coronary artery calcifications.Lymph Nodes: No enlarged supraclavicular, axilla
ry, mediastinal, or hilar lymph nodes.Other Mediastinum: Multinodular right 
thyroid lobe (largest nodule measures 0.9 cm on series 2, image 11).Bones and 
Soft Tissues: No aggressive osseous lytic or blastic lesions.Upper Abdomen: The 
visualized liver, gallbladder, spleen, and adrenal glands are withinnormal 
limits.IMPRESSION:*  Small bilateral pulmonary nodules are nonspecific and can 
be followed.*Multiple small low-density nodules in the thyroid. Further 
evaluation with ultrasound can be performed if not previously evaluated.I 
personally reviewed these image(s) along with the resident's/fellow's 
interpretations, certify that if a procedure was performed I was physically 
present, and agree withthe final report.MD BlackwellPREGNANCY URINE 
MONOCLONAL**FB**2018 04:58:00





             Test Item    Value        Reference Range Interpretation Comments

 

             PREG UR (test code = PGU) NEGATIVE     NEGATIVE                  



PREGNANCY URINE MONOCLONAL**FB**2018 05:47:00





             Test Item    Value        Reference Range Interpretation Comments

 

             PREG UR (test code = PGU) NEGATIVE     NEGATIVE                  



PREGNANCY URINE MONOCLONAL**FB**2018 04:51:00





             Test Item    Value        Reference Range Interpretation Comments

 

             PREG UR (test code = PGU) NEGATIVE     NEGATIVE                  



PREGNANCY URINE MONOCLONAL**FB**2018 04:50:00





             Test Item    Value        Reference Range Interpretation Comments

 

             PREG UR (test code = PGU) NEGATIVE     NEGATIVE

## 2020-11-12 NOTE — EDPHYS
Physician Documentation                                                                           

 [facility]                                                                 

Name: Maricel Varner                                                                              

Age: 49 yrs                                                                                       

Sex: Female                                                                                       

: 1970                                                                                   

MRN: [id number]                                                                                   

Arrival Date: 2020                                                                          

Time: 20:32                                                                                       

Account#: [id number]                                                                            

Bed 15                                                                                            

Private MD:                                                                                       

ED Physician Gilberto Blackwell                                                                      

HPI:                                                                                              

                                                                                             

21:31 This 49 yrs old  Female presents to ER via Wheelchair with complaints of Foot  kb  

      Injury.                                                                                     

21:31 The patient presents with a contusion, an injury, pain, swelling, tenderness. The       kb  

      complaints affect the lateral side of right foot and dorsum of right foot. Context: The     

      problem was sustained at home, outdoors, resulted from the patient falling, the patient     

      is not able to bear weight, the patient is not able to ambulate. Onset: The                 

      symptoms/episode began/occurred just prior to arrival. Modifying factors: The symptoms      

      are alleviated by nothing. the symptoms are aggravated by movement, weight bearing.         

      Associated signs and symptoms: The patient has no apparent associated signs or              

      symptoms. Treatment prior to arrival includes: no previous treatment. Severity of           

      symptoms: At their worst the symptoms were moderate, in the emergency department the        

      symptoms are unchanged. The patient has not experienced similar symptoms in the past.       

      The patient has not recently seen a physician.                                              

                                                                                                  

OB/GYN:                                                                                           

20:54 LMP N/A -                                                                               bb  

                                                                                                  

Historical:                                                                                       

- Allergies:                                                                                      

20:54 No Known Allergies;                                                                     bb  

- Home Meds:                                                                                      

20:54 Buspirone Oral [Active]; lisinopril Oral [Active]; HCTZ [Active]; Trintellix oral oral  bb  

      [Active];                                                                                   

- PMHx:                                                                                           

20:54 Hepatitis; Hypertension; Depression; Anxiety; cervical/uterine cancer;                  bb  

- PSHx:                                                                                           

20:54 Tonsillectomy; Dental surgeries;                                                        bb  

                                                                                                  

- Immunization history:: Adult Immunizations up to date.                                          

- Social history:: Smoking status: Patient denies any tobacco usage or history of.                

  Patient uses alcohol, occasionally. Patient/guardian denies using street drugs.                 

                                                                                                  

                                                                                                  

ROS:                                                                                              

21:29 Constitutional: Negative for fever, chills, and weight loss, Cardiovascular: Negative   kb  

      for chest pain, palpitations, and edema, Respiratory: Negative for shortness of breath,     

      cough, wheezing, and pleuritic chest pain, Abdomen/GI: Negative for abdominal pain,         

      nausea, vomiting, diarrhea, and constipation, Back: Negative for injury and pain, Skin:     

      Negative for injury, rash, and discoloration, Neuro: Negative for headache, weakness,       

      numbness, tingling, and seizure.                                                            

21:29 MS/extremity: Positive for injury or acute deformity, contusion, pain, swelling,            

      tenderness, of the lateral side of right foot and dorsum of right foot.                     

                                                                                                  

Exam:                                                                                             

21:29 Constitutional:  This is a well developed, well nourished patient who is awake, alert,  kb  

      and in no acute distress. Head/Face:  Normocephalic, atraumatic. Chest/axilla:  Normal      

      chest wall appearance and motion.  Nontender with no deformity.  No lesions are             

      appreciated. Cardiovascular:  Regular rate and rhythm with a normal S1 and S2.  No          

      gallops, murmurs, or rubs.  Normal PMI, no JVD.  No pulse deficits. Respiratory:  Lungs     

      have equal breath sounds bilaterally, clear to auscultation and percussion.  No rales,      

      rhonchi or wheezes noted.  No increased work of breathing, no retractions or nasal          

      flaring. Abdomen/GI:  Soft, non-tender, with normal bowel sounds.  No distension or         

      tympany.  No guarding or rebound.  No evidence of tenderness throughout. Neuro:  Awake      

      and alert, GCS 15, oriented to person, place, time, and situation.  Cranial nerves          

      II-XII grossly intact.  Motor strength 5/5 in all extremities.  Sensory grossly intact.     

       Cerebellar exam normal.  Normal gait.                                                      

21:29 Musculoskeletal/extremity: Extremities: grossly normal except: noted in the lateral         

      side of right foot and dorsum of right foot: contusion, pain, swelling, tenderness,         

      ROM: intact in all extremities, Circulation is intact in all extremities. Sensation         

      intact. Weight bearing: is unable to bear weight.                                           

                                                                                                  

Vital Signs:                                                                                      

20:50  / 77; Pulse 101; Resp 18 S; Temp 99.5; Pulse Ox 98% on R/A; Weight 88.45 kg (R); bb  

      Height 5 ft. 7 in. (170.18 cm) (R); Pain 10/10;                                             

21:00  / 75; Pulse 89; Resp 16; Pulse Ox 97% on R/A;                                    jb4 

20:50 Body Mass Index 30.54 (88.45 kg, 170.18 cm)                                             bb  

                                                                                                  

MDM:                                                                                              

20:55 Patient medically screened.                                                             kb  

21:29 Data reviewed: vital signs, nurses notes. Data interpreted: Pulse oximetry: on room air kb  

      is 97 %. Interpretation: normal. Counseling: I had a detailed discussion with the           

      patient and/or guardian regarding: the historical points, exam findings, and any            

      diagnostic results supporting the discharge/admit diagnosis, radiology results, the         

      need for outpatient follow up, a orthopedic surgeon, to return to the emergency             

      department if symptoms worsen or persist or if there are any questions or concerns that     

      arise at home.                                                                              

                                                                                                  

                                                                                             

20:58 Order name: Foot Right 3 View XRAY                                                      kb  

                                                                                             

20:58 Order name: Ice pack; Complete Time: 21:02                                              kb  

                                                                                             

21:47 Order name: Crutches; Complete Time: 22:23                                              kb  

                                                                                                  

Administered Medications:                                                                         

21:11 Drug: Norco 10 mg-325 mg 1 tabs Route: PO;                                              jb4 

                                                                                                  

                                                                                                  

Disposition:                                                                                      

                                                                                             

05:12 Co-signature as Attending Physician, Gilberto Blackwell MD I agree with the assessment and  harvey 

      plan of care.                                                                               

                                                                                                  

Disposition:                                                                                      

20 21:44 Discharged to Home. Impression: Contusion of right foot, Other sprain of right     

  foot.                                                                                           

- Condition is Stable.                                                                            

- Discharge Instructions: Foot Sprain, Foot Contusion, Easy-to-Read.                              

- Prescriptions for Diclofenac Sodium 75 mg Oral Tablet Sustained Release - take 1                

  tablet by ORAL route 2 times per day; 30 tablet.                                                

- Medication Reconciliation Form, Thank You Letter, Antibiotic Education, Prescription            

  Opioid Use form.                                                                                

- Follow up: Emergency Department; When: As needed; Reason: Worsening of condition.               

  Follow up: Private Physician; When: 2 - 3 days; Reason: Recheck today's complaints,             

  Continuance of care, Re-evaluation by your physician.                                           

                                                                                                  

                                                                                                  

                                                                                                  

Signatures:                                                                                       

Dispatcher [employer]                           EDGi Shukla, CABRERAP-C                 CABRERAP-Gilberto Mejia MD MD cha Ballard, Brenda, RN                     RN   Juan Pablo Aguilar RN                       RN   jb4                                                  

                                                                                                  

Corrections: (The following items were deleted from the chart)                                    

                                                                                             

21:47 21:29 Musculoskeletal/extremity: Extremities: grossly normal except: noted in the       kb  

      lateral side of right foot and dorsum of right foot: contusion, pain, swelling,             

      tenderness, ROM: intact in all extremities, Circulation is intact in all extremities.       

      Sensation intact. Weight bearing: can bear weight with assistance only, kb                  

21:47 21:31 Context: The problem was sustained at home, outdoors, resulted from the patient   kb  

      falling, the patient can partially bear weight, the patient is able to ambulate, kb         

21:47 21:44 2020 21:44 Discharged to Home. Impression: Contusion of right foot.         kb  

      Condition is Stable. Forms are Medication Reconciliation Form, Thank You Letter,            

      Antibiotic Education, Prescription Opioid Use. Follow up: Emergency Department; When:       

      As needed; Reason: Worsening of condition. Follow up: Private Physician; When: 2 - 3        

      days; Reason: Recheck today's complaints, Continuance of care, Re-evaluation by your        

      physician. kb                                                                               

22:23 21:47 2020 21:44 Discharged to Home. Impression: Contusion of right foot; Other   jb4 

      sprain of right foot. Condition is Stable. Discharge Instructions: Foot Sprain, Foot        

      Contusion, Easy-to-Read. Prescriptions for Diclofenac Sodium 75 mg Oral Tablet              

      Sustained Release - take 1 tablet by ORAL route 2 times per day; 30 tablet. and Forms       

      are Medication Reconciliation Form, Thank You Letter, Antibiotic Education,                 

      Prescription Opioid Use. Follow up: Emergency Department; When: As needed; Reason:          

      Worsening of condition. Follow up: Private Physician; When: 2 - 3 days; Reason: Recheck     

      today's complaints, Continuance of care, Re-evaluation by your physician. kb                

                                                                                                  

**************************************************************************************************

## 2020-11-12 NOTE — XMS REPORT
[facility]

                           Created on:2020



Patient:Maricel Varner

Sex:Female

:1970

External Reference #:[id number]





Demographics







                          Address                   [address] [place] [id number]

 

                          Phone                     [phone]

 

                          Preferred Language        en

 

                          Marital Status            Unknown

 

                          Nondenominational Affiliation     Unknown

 

                          Race                      White

 

                          Ethnic Group              Unknown









Author







                          Organization              [employer]









Care Team Providers







                    Name                Role                Phone

 

                    Talia Pan        Provider Role       Unavailable









Allergies, Adverse Reactions, Alerts







                    Substance           Reaction            Event Type

 

                    N.K.D.A.            Info Not Available  Non Drug Allergy







Problems







             Problem Type Condition    Code         Onset Dates  Condition Statu

s

 

             Assessment   Anxiety      F41.9                     Active

 

             Assessment   Muscle cramps R25.2                     Active

 

             Problem      Muscle cramps R25.2                     Active

 

             Problem      Major depressive disorder with single F32.4           

          Active



                          episode, in partial remission                         

  

 

             Problem      Anxiety      F41.9                     Active

 

             Assessment   Hypertension, unspecified type I10                    

   Active

 

             Assessment   Major depressive disorder with single F32.4           

          Active



                          episode, in partial remission                         

  

 

             Problem      Hypertension, unspecified type I10                    

   Active







Medications







        Medication Code    Code    Instructions Start   End Date Status  Dosage



                System                  Date                    

 

        Trintellix NDC     [id number] 10 MG                   Active  TAKE 1



                                                                TABLET BY



                                                                MOUTH



                                                                EVERY DAY

 

        Zestoretic NDC     [id number] 20-25 MG Orally                 Active  t

guido 1



                                Once a day                         tablet



                                                                every day

 

        BusPIRone HCl NDC     [id number] 7.5 MG Orally                 Active  

1 tablet



                                Three times a                         



                                day                             







Results

No Known Results



Summary Purpose

[employer] Submission

## 2020-11-13 VITALS — SYSTOLIC BLOOD PRESSURE: 126 MMHG | DIASTOLIC BLOOD PRESSURE: 75 MMHG | OXYGEN SATURATION: 97 %

## 2020-11-13 VITALS — TEMPERATURE: 99.5 F

## 2020-11-13 NOTE — RAD REPORT
EXAM DESCRIPTION:  RAD - Foot Right 3 View - 11/12/2020 9:41 pm

 

CLINICAL HISTORY:  PAIN, trip and fall

 

COMPARISON:  No comparisonsdelete select

 

FINDINGS:  No fracture, dislocation or periosteal reaction. No acute bone or joint process identifiab
le. Metallic suture lines seen in the first proximal phalanx from remote repair. No plantar spur.

 

No air or foreign body in the soft tissues.

 

IMPRESSION:  Negative right foot for acute bone or joint finding.

## 2021-09-14 ENCOUNTER — HOSPITAL ENCOUNTER (EMERGENCY)
Dept: HOSPITAL 97 - ER | Age: 51
LOS: 1 days | Discharge: HOME | End: 2021-09-15
Payer: COMMERCIAL

## 2021-09-14 DIAGNOSIS — F41.8: ICD-10-CM

## 2021-09-14 DIAGNOSIS — I10: ICD-10-CM

## 2021-09-14 DIAGNOSIS — U07.1: Primary | ICD-10-CM

## 2021-09-14 DIAGNOSIS — Z85.41: ICD-10-CM

## 2021-09-14 DIAGNOSIS — E86.0: ICD-10-CM

## 2021-09-14 DIAGNOSIS — Z85.42: ICD-10-CM

## 2021-09-14 DIAGNOSIS — J12.82: ICD-10-CM

## 2021-09-14 LAB
ALBUMIN SERPL BCP-MCNC: 3.1 G/DL (ref 3.4–5)
ALP SERPL-CCNC: 94 U/L (ref 45–117)
ALT SERPL W P-5'-P-CCNC: 54 U/L (ref 12–78)
AST SERPL W P-5'-P-CCNC: 44 U/L (ref 15–37)
BUN BLD-MCNC: 10 MG/DL (ref 7–18)
CRP SERPL-MCNC: 61.9 MG/L (ref ?–3)
FERRITIN SERPL-MCNC: 355.7 NG/ML (ref 8–388)
GLUCOSE SERPLBLD-MCNC: 104 MG/DL (ref 74–106)
HCT VFR BLD CALC: 40.8 % (ref 36–45)
INR BLD: 1.5
LYMPHOCYTES # SPEC AUTO: 0.4 K/UL (ref 0.7–4.9)
MAGNESIUM SERPL-MCNC: 2.3 MG/DL (ref 1.8–2.4)
NT-PROBNP SERPL-MCNC: 32 PG/ML (ref ?–125)
PMV BLD: 6.6 FL (ref 7.6–11.3)
POTASSIUM SERPL-SCNC: 3.4 MMOL/L (ref 3.5–5.1)
RBC # BLD: 4.87 M/UL (ref 3.86–4.86)
TROPONIN (EMERG DEPT USE ONLY): < 0.02 NG/ML (ref 0–0.04)

## 2021-09-14 PROCEDURE — 99284 EMERGENCY DEPT VISIT MOD MDM: CPT

## 2021-09-14 PROCEDURE — 71045 X-RAY EXAM CHEST 1 VIEW: CPT

## 2021-09-14 PROCEDURE — 93005 ELECTROCARDIOGRAM TRACING: CPT

## 2021-09-14 PROCEDURE — 83880 ASSAY OF NATRIURETIC PEPTIDE: CPT

## 2021-09-14 PROCEDURE — 82728 ASSAY OF FERRITIN: CPT

## 2021-09-14 PROCEDURE — 80076 HEPATIC FUNCTION PANEL: CPT

## 2021-09-14 PROCEDURE — 71275 CT ANGIOGRAPHY CHEST: CPT

## 2021-09-14 PROCEDURE — 36415 COLL VENOUS BLD VENIPUNCTURE: CPT

## 2021-09-14 PROCEDURE — 86140 C-REACTIVE PROTEIN: CPT

## 2021-09-14 PROCEDURE — 85610 PROTHROMBIN TIME: CPT

## 2021-09-14 PROCEDURE — 80048 BASIC METABOLIC PNL TOTAL CA: CPT

## 2021-09-14 PROCEDURE — 87040 BLOOD CULTURE FOR BACTERIA: CPT

## 2021-09-14 PROCEDURE — 96374 THER/PROPH/DIAG INJ IV PUSH: CPT

## 2021-09-14 PROCEDURE — 96372 THER/PROPH/DIAG INJ SC/IM: CPT

## 2021-09-14 PROCEDURE — 85025 COMPLETE CBC W/AUTO DIFF WBC: CPT

## 2021-09-14 PROCEDURE — 84484 ASSAY OF TROPONIN QUANT: CPT

## 2021-09-14 PROCEDURE — 83735 ASSAY OF MAGNESIUM: CPT

## 2021-09-14 PROCEDURE — 96375 TX/PRO/DX INJ NEW DRUG ADDON: CPT

## 2021-09-14 NOTE — RAD REPORT
EXAM DESCRIPTION:  RAD - Chest Single View - 9/14/2021 7:27 pm

 

CLINICAL HISTORY:  COUGH

 

COMPARISON:  Chest Pa And Lat (2 Views) dated 12/19/2018

 

FINDINGS:  Lines: None.

Lungs: Mild patchy airspace disease bilaterally.

Pleural: No significant pleural effusions or pneumothorax.

Cardiac: The heart size is within normal limits.

Bones: No acute fractures.

Other:

 

IMPRESSION:  Mild bilateral airspace disease concerning for multifocal pneumonia, including Covid-19 
.

## 2021-09-14 NOTE — XMS REPORT
Clinical Summary

                          Created on:2021



Patient:Maricel Varner

Sex:Female

:1970

External Reference #:[id number]





Demographics







                          Address                   [address] [address] [address]

 

                          Home Phone                [phone]

 

                          Mobile Phone              [phone]

 

                          Email Address             [email or web address]

 

                          Preferred Language        English

 

                          Marital Status            

 

                          Anabaptism Affiliation     Unknown

 

                          Race                      White

 

                          Ethnic Group              Not  or 









Author







                          Organization              [facility] [place]

[facility]

 

                          Address                   [address] [place] [address]









Support







                Name            Relationship    Address         Phone

 

                Yared Johnson        Unavailable     Unavailable     [phone]









Care Team Providers







                    Name                Role                Phone

 

                    MD Nancy         Primary Care Provider [phone]

 

                    SHELLEY Alberto       Unavailable         [phone]









Allergies

No Known Active Allergies



Medications







          Medication Sig       Dispensed Refills   Start     End       Status



                                                  Date      Date      

 

          lisinopril-hydroCHLO Take 1 tablet by           0            

        Active



          ROthiazide mouth daily.                     0                   



          (PRINZIDE,ZESTORETIC                                                  

 



          ) 20-25 mg per                                                   



          tablet                                                      

 

          busPIRone (BUSPAR) Take 1 tablet by           0              

      Active



          7.5 mg tablet mouth 3 (three)                     0                   



                    times a day.                                         

 

          Trintellix 10 mg tab Take 1 tablet by           0         10/23/202   

        Active



                    mouth daily.                     0                   

 

          estrogens, Insert 0.5 g 30 g      3                    Active



          conjugated, into the vagina                     1                   



          (Premarin) vaginal 3 (three) times                                    

     



          creamIndications: a week Monday,                                      

   



          Malignant neoplasm Wednesday and                                      

   



          of cervix uteri, not Friday.                                          

 



          otherwise specified                                                   

 

          estrogens, Take 1 tablet by 30 tablet 6                    Ac

tive



          conjugated,-medroxyP mouth daily.                     1               

    



          ROGESTERone                                                   



          (Prempro) 0.625                                                   



          mg-2.5 mg per                                                   



          tabletIndications:                                                   



          Malignant neoplasm                                                   



          of cervix uteri, not                                                  

 



          otherwise specified                                                   

 

          aspirin 81 mg EC Take 81 mg by           0                     

 Discontinued



          tablet    mouth daily.                               020       (Not



                                                                      Applicable

)

 

          LYSINE ORAL Take 1 tablet by           0                      D

iscontinued



                    mouth daily.                               020       (Not



                                                                      Applicable

)

 

          POTASSIUM ORAL Take 1 tablet by           0                    

  Discontinued



                    mouth daily.                               020       (Not



                                                                      Applicable

)

 

          ondansetron (Zofran) Take 1 tablet by 30 tablet 3            Discontinued



          8 mg      mouth every 8                     0         020       (Thera

py



          tabletIndications: hours on days 2,                                   

      completed)



          Malignant neoplasm 3, and 4 of                                        

 



          of cervix uteri, not chemotherapy                                     

    



          otherwise specified each week, then                                   

      



                    may take 1                                         



                    tablet by mouth                                         



                    every 8 hours as                                         



                    needed for                                         



                    nausea or                                         



                    vomiting.                                         

 

          prochlorperazine Take 1 tablet 30 tablet 3           

 Discontinued



          (Compazine) 10 mg (10 mg) by mouth                     0         021  

     (Therapy



          tabletIndications: every 6 (six)                                      

   completed)



          Malignant neoplasm hours as needed                                    

     



          of cervix uteri, not for nausea or                                    

     



          otherwise specified vomiting.                                         

 

          ondansetron (Zofran) Take 1 tablet (8 30 tablet 2            Discontinued



          8 mg      mg) by mouth                     0         020       (Reorde

r)



          tabletIndications: every 8 (eight)                                    

     



          Malignant neoplasm hours as needed                                    

     



          of cervix uteri, not for nausea.                                      

   



          otherwise specified                                                   

 

          ondansetron (Zofran) Take 1 tablet (8 60 tablet 2            Discontinued



          8 mg      mg) by mouth                     0         021       (Therap

y



          tabletIndications: every 8 (eight)                                    

     completed)



          Malignant neoplasm hours as needed                                    

     



          of cervix uteri, not for nausea.                                      

   



          otherwise specified                                                   

 

          phenazopyridine Take 1 tablet 21 tablet 1            





          (Pyridium) 100 mg (100 mg) by                     0         020       



          tabletIndications: mouth 3 (three)                                    

     



          Malignant neoplasm times a day for                                    

     



          of cervix uteri, not 7 days.                                          

 



          otherwise specified                                                   







Active Problems







                          Problem                   Noted Date

 

                          Infertility due to radiation 2020

 

                          Encounter for examination prior to antineoplastic chem

otherapy 2020

 

                          Malignant neoplasm of cervix uteri 10/26/2020









                                        Cancer Staging: Clinical stage from 10/2

2020: Stage IB1 (Primary) - Unsigned









                          Complex endometrial hyperplasia 10/26/2020

 

                          Abnormal uterine bleeding 10/26/2020

 

                          Malignant neoplasm of endometrium 10/26/2020

 

                          Anxiety disorder          10/26/2020

 

                          Essential hypertension    10/26/2020

 

                          Chronic depression        10/26/2020







Encounters







             Date         Type         Specialty    Care Team    Description

 

             2021   Orders Only  Radiation Oncology Michel      Malignant

 neoplasm



                                                    Haley, NP  of cervix uteri

, not



                                                                 otherwise speci

fied



                                                                 (Primary Dx)

 

             2021   Orders Only  Gynecology   Ina Barrera PA 

 

             08/10/2021   Orders Only  Radiation Oncology Michel,      Malignant

 neoplasm



                                                    Haley, NP  of cervix uteri

, not



                                                                 otherwise speci

fied



                                                                 (Primary Dx)

 

             2021   Orders Only  Radiation Oncology Michel,      



                                                    Haley, NP  

 

             2021   Orders Only  Radiation Oncology Michel,      Malignant

 neoplasm



                                                    Haley, NP  of cervix uteri

, not



                                                                 otherwise speci

fied



                                                                 (Primary Dx)

 

             2021   Ancillary Procedure Radiology    Raul Cruz, Cancer



                                                    MD           

 

             2021   Ancillary Procedure Radiology    Raul Cruz, Cancer



                                                    MD           

 

             2021   Ancillary Procedure Radiology    Raul Cruz, Cancer



                                                    MD           

 

             2021   Ancillary Procedure Radiology    Raul Cruz, Cancer



                                                    MD           

 

             2021   Ancillary Procedure Radiology    Raul Cruz, Cancer



                                                    MD           

 

             2021   Ancillary Procedure Radiology    Raul Cruz, Cancer



                                                    MD           

 

             2021   Ancillary Procedure Radiology    Raul Cruz, Cancer



                                                    MD           

 

             2021   Ancillary Procedure Radiology    Raul Cruz, Cancer



                                                    MD           

 

             2021   Hospital Encounter Radiation Oncology Robert Ybarraant neoplasm of

endometrium (Primary Dx);



                                                    MD Angeles    Malignant neopl

asm of cervix uteri, not otherwise specified

 

             2021   Ancillary Procedure Radiology    Michel,      Malignan

t neoplasm



                                                    Haley, NP  of cervix uteri

, not



                                                                 otherwise speci

fied

 

             2021   Orders Only  Radiation Oncology Michel,      



                                                    Haley, NP  

 

             2021   Travel                                 

 

             2021   Orders Only  Radiation Oncology Michel,      Malignant

 neoplasm



                                                    Haley, NP  of cervix uteri

, not



                                                                 otherwise speci

fied



                                                                 (Primary Dx)

 

             2021   Orders Only  Surgical Oncology Eddi Puente, Maligna

nt neoplasm



                                                    PA           of cervix uteri

, not



                                                                 otherwise speci

fied



                                                                 (Primary Dx)

 

             2021   Office Visit Surgical Oncology Cecilio Montano, Maligna

nt neoplasm of 

cervix uteri, not otherwise specified;



                                                                MD



                                        Malignant neoplasm of endometrium



                                                    Raul Cruz MD           

 

             2021   Travel                                 

 

             2021   Ancillary Procedure Radiology    Ina Barrera, PA [place]

nant neoplasm of 

cervix uteri, not otherwise specified;



                                                                 Malignant neopl

asm of endometrium

 

             2021   Travel                                 

 

             2021   Anesthesia Event Radiology    Yoanna Tony, LARON  

 

             2021   Ancillary Procedure Radiology    Michel,      Pain in 

unspecified



                                                    Haley, NP  hip

 

             2021   Ancillary Procedure Radiology    Michel,      Pain in 

unspecified



                                                    Haley, NP  hip

 

             2021   Travel                                 

 

             2021   Orders Only  Radiation Oncology Michel,      



                                                    Haley, NP  

 

             2021   Orders Only  Radiation Oncology Michel,      Pain in u

nspecified



                                                    Haley, NP  hip (Primary Dx

)

 

             2021   Orders Only  Radiation Oncology Angeles Ybarra MD    

 

             2021   Ancillary Procedure Radiology    Issac, Maligna

nt neoplasm



                                                    Jackelyn A, APN of overlapping 

sites



                                                                 of cervix uteri

 

             2021   Travel                                 

 

             2021   Orders Only  Surgical Oncology Ina Barrera PA 

 

             2021   Office Visit Surgical Oncology Raul Cruz Malignan

t neoplasm of 

cervix uteri, not otherwise specified (Primary Dx);



                                                    MD           Malignant neopl

asm of endometrium

 

             2021   Travel                                 

 

             2021   Orders Only  Infectious Diseases Elena     SARS-CoV

-2



                                                    MD Jessica   vaccination

 

             2021   Documentation Radiation Oncology Angeles Ybarra MD    

 

             2021   Travel                                 

 

             2021   Travel                                 

 

             2021   Travel                                 

 

             2021   Hospital Encounter Radiation Oncology Angeles Ybarra MD    

 

             2021   Anesthesia Event Ambulatory Surgery Queenie Dougherty MD       

 

             2021   Surgery      Ambulatory Surgery Paula Mchugh MD (CT) IN

SERTION OF



                                                                 UTERINE TANDEM 

AND



                                                                 VAGINAL OVOID F

OR



                                                                 CLINICAL



                                                                 BRACHYTHERAPY

 

             2021   Hospital Encounter Ambulatory Surgery Paula Mchugh MD 

 

             2021   Travel                                 

 

             01/10/2021   Clinical Support Infectious Diseases Issac, Enc

ounter for 

observation for other suspected exposure to biological agent ruled out (Primary 
Dx);



                                                                Jackelyn A, APN



                                        Malignant neoplasm of cervix uteri, not 

otherwise specified



                                                    Alisa Jimenes MA           

 

             01/10/2021   Travel                                 

 

             2021   Surgery      Ambulatory Surgery aRhel Lopes MD (CT) IN

SERTION OF



                                                                 UTERINE TANDEM 

AND



                                                                 VAGINAL OVOID F

OR



                                                                 CLINICAL



                                                                 BRACHYTHERAPY

 

             2021   Anesthesia Event Ambulatory Surgery Shaheed Brantley MD Smith, Reshonda, CRNA 

 

             2021   Hospital Encounter Ambulatory Surgery Rahel Lopes MD 

 

             2021   Telephone    Surgical Oncology Ina Barrera PA 

 

             2021   Documentation Radiation Oncology Angeles Ybarra MD    

 

             2021   Travel                                 

 

             2021   Clinical Support Infectious Diseases Foster-Coppola, Alicia

pected COVID-19

(Primary Dx);



                                                                Jackelyn A, APN



                                        Malignant neoplasm of cervix uteri, not 

otherwise specified



                                                    Lupis Sheth RN 

 

             2021   Travel                                 

 

             2021   Travel                                 

 

             2021   Telephone    Surgical Oncology Jennifer Maldonado RN 

 

             2021   Orders Only  Surgical Oncology Ina Barrera PA 

 

             2021   Travel                                 

 

             2021   Orders Only  Surgical Oncology Ina Barrera PA Maligna

nt neoplasm



                                                                 of cervix uteri

, not



                                                                 otherwise speci

fied



                                                                 (Primary Dx)

 

             2021   Hospital Encounter Lab          Ina Barrera PA Malign

ant neoplasm



                                                                 of cervix uteri

, not



                                                                 otherwise speci

fied

 

             2021   Hospital Encounter Radiation Oncology Angeles Ybarra MD    

 

             2021   Surgery      Ambulatory Surgery Amada,    (CT) INSE

RTION OF



                                                    MD Angeles    UTERINE TANDEM 

AND



                                                                 VAGINAL OVOID F

OR



                                                                 CLINICAL



                                                                 BRACHYTHERAPY

 

             2021   Anesthesia Event Ambulatory Surgery Chantel Avila MD           

 

             2021   Hospital Encounter Ambulatory Surgery Amada,    Mal

ignant neoplasm



                                                    MD Angeles    of cervix uteri

, not



                                                                 otherwise speci

fied



                                                                 (Primary Dx)

 

             2021   Hospital Encounter Radiation Oncology Raul Cruz MD           

 

             2021   Documentation Radiation Oncology Angeles Ybarra MD    

 

             2021   Documentation Radiation Oncology Angeles Ybarra MD    

 

             2021   Documentation Radiation Oncology Angeles Ybarra MD    

 

             2021   Travel                                 

 

             2021   Clinical Support Infectious Diseases Amilcar Davenport

ounter for 

observation for other suspected exposure to biological agent ruled out (Primary 
Dx);



                                                                Jackelyn A, APN



                                        Malignant neoplasm of cervix uteri, not 

otherwise specified



                                                    Umu Guzman, LARON   

 

             2021   Travel                                 

 

             2020   Hospital Encounter Audiology    Raul Cruz, Sensori

neural



                                                    MD           hearing loss,



                                                                 bilateral (Prim

severiano



                                                                 Dx)

 

             2020   Travel                                 

 

             2020   Travel                                 

 

             2020   Travel                                 

 

             2020   Hospital Encounter Lab          Ina Barrera PA Malign

ant neoplasm



                                                                 of cervix uteri

, not



                                                                 otherwise speci

fied

 

             2020   Surgery      Ambulatory Surgery Amada,    (CT) INSE

RTION OF



                                                    MD Angeles    UTERINE TANDEM 

AND



                                                                 VAGINAL OVOID F

OR



                                                                 CLINICAL



                                                                 BRACHYTHERAPY

 

             2020   Anesthesia Event Ambulatory Surgery Caroline Mckeon MD Lipman, Daniel, CRNA         

 

             2020   Hospital Encounter Ambulatory Surgery Amada    Mal

ignant neoplasm



                                                    MD Angeles    of cervix uteri

, not



                                                                 otherwise speci

fied



                                                                 (Primary Dx)

 

             2020   Documentation Radiation Oncology Angeles Ybarra MD    

 

             2020   Telephone    Surgical Oncology Ina Barrera PA 

 

             2020   Orders Only  Surgical Oncology Ina Barrera PA Maligna

nt neoplasm



                                                                 of cervix uteri

, not



                                                                 otherwise speci

fied



                                                                 (Primary Dx)

 

             2020   Documentation Radiation Oncology Angeles Ybarra MD    

 

             2020   Orders Only  Surgical Oncology Ina Barrera PA 

 

             2020   Documentation Radiation Oncology Angeles Ybarra MD    

 

             2020   Documentation Radiation Oncology Angeles Ybarra MD    

 

             2020   Travel                                 

 

             2020   Clinical Support Infectious Diseases Amilcar Davenport

ounter for 

observation for other suspected exposure to biological agent ruled out (Primary 
Dx);



                                                                Jackelyn A, APN



                                        Malignant neoplasm of cervix uteri, not 

otherwise specified



                                                    Tunde Badillo RN      

 

             2020   Travel                                 

 

             2020   Travel                                 

 

             2020   Clinical Support Radiation Oncology Angeles Ybarra MD    

 

             2020   Infusion     Infusion Services Ina Barrera PA Maligna

nt neoplasm



                                                                 of cervix uteri

, not



                                                                 otherwise speci

fied



                                                                 (Primary Dx)

 

             2020   Orders Only  Radiation Oncology Issac, Malignan

t neoplasm



                                                    Jackelyn A, APN of cervix uteri

, not



                                                                 otherwise speci

fied



                                                                 (Primary Dx)

 

             2020   Orders Only  Radiation Oncology Amada    Malignant

 neoplasm



                                                    MD Angeles    of cervix uteri

, not



                                                                 otherwise speci

fied



                                                                 (Primary Dx)

 

             2020   Documentation Radiation Oncology Angeles Ybarra MD    

 

             2020   Travel                                 

 

             2020   Telemedicine Surgical Oncology Raul Cruz, Malignan

t neoplasm of 

cervix uteri, not otherwise specified (Primary Dx);



                                                    MD           Malignant neopl

asm of endometrium;



                                                                 Encounter for e

xamination prior to antineoplastic chemotherapy;



                                                                 Malignant neopl

asm of overlapping sites of cervix uteri;



                                                                 Nausea without 

vomiting

 

             2020   Hospital Encounter Lab          Ina Barrera PA Malign

ant neoplasm



                                                                 of cervix uteri

, not



                                                                 otherwise speci

fied

 

             2020   Ancillary Procedure Radiology                 

 

             2020   Surgery      Ambulatory Surgery Amada,    (CT) INSE

RTION OF



                                                    MD Angeles    UTERINE TANDEM 

AND



                                                                 VAGINAL OVOID F

OR



                                                                 CLINICAL



                                                                 BRACHYTHERAPY

 

             2020   Anesthesia Event Ambulatory Surgery Torsten Portillo MD       

 

             2020   Hospital Encounter Ambulatory Surgery Amada,    Mal

ignant neoplasm



                                                    MD [place]    of cervix uteri

, not



                                                                 otherwise speci

fied



                                                                 (Primary Dx)

 

             2020   Orders Only  Surgical Oncology Raul Cruz MD           

 

             2020   Documentation Radiation Oncology [place] Ybarra MD    

 

             2020   Orders Only  Radiation Oncology Tavon Bacon, Malign

ant neoplasm



                                                    MD           of cervix uteri

, not



                                                                 otherwise speci

fied



                                                                 (Primary Dx)

 

             2020   Documentation Radiation Oncology [place] Ybarra MD    

 

             2020   Documentation Radiation Oncology [place] Ybarra MD    

 

             2020   Documentation Radiation Oncology [place] Ybarra MD    

 

             2020   Travel                                 

 

             2020   Travel                                 

 

             2020   Clinical Support Infectious Diseases Alicia Davenport

pected COVID-19

(Primary Dx);



                                                                Jackelyn A, APN



                                        Malignant neoplasm of cervix uteri, not 

otherwise specified



                                                    Nasima Saeed MA        

 

             2020   Travel                                 

 

             2020   Travel                                 

 

             2020   Ancillary Procedure Radiology    Issac, Maligna

nt neoplasm



                                                    Jackelyn A, APN of cervix uteri

, not



                                                                 otherwise speci

fied

 

             2020   Anesthesia Event Anesthesiology Hannah Nolasco MA        

 

             2020   POEM Appointments Anesthesiology Rito Davenport

er for other 

preprocedural examination (Primary Dx);



                                                    Jackelyn A, APN Malignant neopl

asm of cervix uteri, not otherwise specified

 

             2020   Hospital Encounter Radiation Oncology Tomas Davenport

lignant 

neoplasm



                                                    Jackelyn A, APN of cervix uteri

, not



                                                                 otherwise speci

fied

 

             2020   Hospital Encounter Lab          Issac, Malignan

t neoplasm



                                                    Jackelyn A, APN of cervix uteri

, not



                                                                 otherwise speci

fied

 

             2020   Orders Only  Radiation Oncology Issac, Malignan

t neoplasm



                                                    Jackelyn A, APN of overlapping 

sites



                                                                 of cervix uteri



                                                                 (Primary Dx)

 

             2020   Orders Only  Radiation Oncology Issac, Malignan

t neoplasm



                                                    Jackelyn A, APN of overlapping 

sites



                                                                 of cervix uteri



                                                                 (Primary Dx)

 

             2020   Travel                                 

 

             12/15/2020   Infusion     Infusion Services Ina Barrera PA Maligna

nt neoplasm



                                                                 of cervix uteri

, not



                                                                 otherwise speci

fied



                                                                 (Primary Dx)

 

             12/15/2020   Orders Only  Surgical Oncology Ina Barrera PA 

 

             12/15/2020   Travel                                 

 

             2020   Clinical Support Radiation Oncology Angeles Ybarra MD    

 

             2020   Travel                                 

 

             2020   Travel                                 

 

             12/10/2020   Travel                                 

 

             2020   Travel                                 

 

             2020   Infusion     Infusion Services Ina Barrera PA Maligna

nt neoplasm



                                                                 of cervix uteri

, not



                                                                 otherwise speci

fied



                                                                 (Primary Dx)

 

             2020   Travel                                 

 

             2020   Clinical Support Radiation Oncology [place] Ybarra MD    

 

             2020   Telemedicine Surgical Oncology Raul Cruz, Malignan

t neoplasm of 

cervix uteri, not otherwise specified (Primary Dx);



                                                    MD           Malignant neopl

asm of endometrium;



                                                                 Encounter for e

xamination prior to antineoplastic chemotherapy;



                                                                 Malignant neopl

asm of overlapping sites of cervix uteri;



                                                                 Nausea;



                                                                 Constipation al

ternates with diarrhea

 

             2020   Orders Only  Surgical Oncology Raul Cruz MD           

 

             2020   Travel                                 

 

             2020   Travel                                 

 

             2020   Travel                                 

 

             2020   Travel                                 

 

             2020   Infusion     Infusion Services Ina Barrera PA Maligna

nt neoplasm



                                                                 of cervix uteri

, not



                                                                 otherwise speci

fied



                                                                 (Primary Dx)

 

             2020   Documentation              Clayton Mauricio MDiv      

 

             2020   Travel                                 

 

             2020   Clinical Support Radiation Oncology [place] Ybarra MD    

 

             2020   Orders Only  Radiation Oncology Amada    Malignant

 neoplasm



                                                    MD [place]    of cervix uteri

, not



                                                                 otherwise speci

fied



                                                                 (Primary Dx)

 

             2020   Travel                                 

 

             2020   Travel                                 

 

             2020   Infusion     Infusion Services Ina Barrera PA Maligna

nt neoplasm



                                                                 of cervix uteri

, not



                                                                 otherwise speci

fied



                                                                 (Primary Dx)

 

             2020   Travel                                 

 

             2020   Clinical Support Radiation Oncology [place] Ybarra MD    

 

             2020   Telemedicine Surgical Oncology Raul Cruz, Malignan

t neoplasm of 

overlapping sites of cervix uteri (Primary Dx);



                                                    MD           Malignant neopl

asm of endometrium;



                                                                 Malignant neopl

asm of cervix uteri, not otherwise specified

 

             2020   Orders Only  Gynecology   Raul Cruz MD           

 

             2020   Travel                                 

 

             2020   Documentation Radiation Oncology [place] Ybarra MD    

 

             2020   Orders Only  Surgical Oncology Ina Barrera PA 

 

             2020   Orders Only  Surgical Oncology Ina Barrera PA Maligna

nt neoplasm



                                                                 of cervix uteri

, not



                                                                 otherwise speci

fied



                                                                 (Primary Dx)

 

             2020   Orders Only  Radiation Oncology Issac, Malignan

t neoplasm



                                                    Jackelyn A, APN of cervix uteri

, not



                                                                 otherwise speci

fied



                                                                 (Primary Dx)

 

             2020   Prep for Surgery Radiation Oncology Issac, Vesna

gnant neoplasm



                                                    Jackelyn A, APN of cervix uteri

, not



                                                                 otherwise speci

fied



                                                                 (Primary Dx)

 

             2020   Consult      Radiation Oncology [facility]    Malignant

 charlie Reynoso MD    of cervix uteri

, not



                                                                 otherwise speci

fied

 

             2020   Documentation Radiation Oncology [place] Ybarra MD    

 

             2020   Documentation Radiation Oncology [place] Ybarra MD    

 

             2020   Travel                                 

 

             2020   Orders Only  Radiation Oncology Amada    Malignant

 charlie Reynoso MD    of overlapping 

sites



                                                                 of cervix uteri



                                                                 (Primary Dx)

 

             2020   Office Visit Gynecology   Raul Cruz, Malignant alex

plasm of cervix 

uteri, not otherwise specified;



                                                    MD           Complex endomet

rial hyperplasia;



                                                                 Malignant neopl

asm of endometrium

 

             2020   Documentation              Samara Bagley RD 

 

             2020   Travel                                 

 

             2020   Ancillary Procedure Radiology    Ina Barrera PA Malig

nant neoplasm of 

cervix uteri, not otherwise specified;



                                                                 Complex endomet

rial hyperplasia

 

             2020   Hospital Encounter Lab          Ina Barrera PA Malign

ant neoplasm of cervix 

uteri, not otherwise specified;



                                                                 Complex endomet

rial hyperplasia;



                                                                 Malignant neopl

asm of endometrium

 

                2020      Clinical Support Infectious Diseases Ina Barrera PA



                                        Suspected COVID-19 (Primary Dx);



                                                    Herve   Malignant neopl

asm of cervix uteri, not otherwise specified;



                                                    Mcintyre,     Malignant neopl

asm of endometrium;



                                                    Muriel     Complex endomet

rial hyperplasia

 

             2020   Telephone    Gynecology   Adrianna Miller RN        

 

             2020   Travel                                 

 

             2020   Orders Only  Surgical Oncology Ina Barrera PA Maligna

nt neoplasm of 

cervix uteri, not otherwise specified (Primary Dx);



                                                                 Complex endomet

rial hyperplasia

 

             11/10/2020   POEM Appointments Anesthesiology Ina Barrera PA Malig

nant neoplasm 

of cervix uteri, not otherwise specified;



                                                                 Complex endomet

rial hyperplasia;



                                                                 Malignant neopl

asm of endometrium

 

             2020   Anesthesia Event Anesthesiology Dorothy Bell RN        

 

                2020      Hospital Encounter Radiology       Ina Barrera PA



                                        Malignant neoplasm



                                                    Lay, Mj, of cervix ut

barry, not



                                                    MD           otherwise speci

fied

 

             2020   Hospital Encounter Lab          Himanshu James PA     

 

             2020   Ancillary Procedure Radiology    Ina Barrera PA Thyro

id nodule

 

             2020   Travel                                 

 

             2020   Hospital Encounter Radiology    Raul Cruz, Cervica

l cancer (Primary 

Dx);



                                                                MD



                                        Soft tissue lesion of pelvic region;



                                                    Alondra Saab, Encounter for

 preprocedural examination



                                                    PA           

 

             2020   Telephone    Radiology    Melissa James MA 

 

             2020   Orders Only  Radiology    Alondra Saab PA           

 

             2020   Orders Only  Surgical Oncology Ina Barrera PA Thyroid

 nodule



                                                                 (Primary Dx)

 

             2020   Orders Only  Radiology    Himanshu James PA     

 

             2020   Orders Only  Surgical Oncology Ina Barrera PA Maligna

nt neoplasm



                                                                 of cervix uteri

, not



                                                                 otherwise speci

fied



                                                                 (Primary Dx)

 

             10/30/2020   Ancillary Procedure Radiology    Ina Barrera PA [place]

nant neoplasm of 

cervix uteri, not otherwise specified;



                                                                 Complex endomet

rial hyperplasia;



                                                                 Malignant neopl

asm of endometrium

 

             10/30/2020   Travel                                 

 

             10/29/2020   Lab Requisition              Yusuf Christopher MD Witson, Anne S., MD       

 

             10/28/2020   Ancillary Procedure Radiology    Raul Cruz, Cancer



                                                    MD           

 

             10/28/2020   Ancillary Procedure Radiology    Ina Barrera PA Malig

nant neoplasm of 

cervix uteri, not otherwise specified;



                                                                 Complex endomet

rial hyperplasia;



                                                                 Malignant neopl

asm of endometrium

 

             10/28/2020   Travel                                 

 

             10/27/2020   Telephone    Surgical Oncology Ina Barrera PA 

 

             10/26/2020   Office Visit Surgical Oncology Raul Cruz, Malignwilliams

t neoplasm of 

cervix uteri, not otherwise specified (Primary Dx);



                                                    MD           Complex endomet

rial hyperplasia;



                                                                 Malignant neopl

asm of endometrium

 

             10/26/2020   NPR          Patient Access Raul Cruz, 



                                       Services     MD           

 

             10/26/2020   Prep for Surgery Surgical Oncology Ina Barrera PA Mal

ignant neoplasm

of cervix uteri, not otherwise specified (Primary Dx);



                                                                 Malignant neopl

asm of endometrium;



                                                                 Complex endomet

rial hyperplasia

 

             10/26/2020   Travel                                 

 

             10/22/2020   Clinical Support Infectious Diseases Raul Cruz, Sims

spected 

COVID-19



                                                                MD



                                        (Primary Dx)



                                                    Jasmin Locke 

 

             10/22/2020   Travel                                 



after 2020



Surgical History







                Surgery         Date            Site/Laterality Comments

 

                DILATION AND CURETTAGE OF                                 



                UTERUS                                          

 

                TONSILLECTOMY WITH                                 



                ADENOIDECTOMY                                   

 

                 SECTION, CLASSIC                                 

 

                HAND SURGERY                                    

 

                BREAST LUMPECTOMY                                 

 

                BUNIONECTOMY                                    second surgery t

o remove



                                                                screw

 

                HEEL SPUR EXCISION                                 

 

                MN INSERTION UTERINE 2020      N/A             Procedure: 

(CT) INSERTION OF



                TANDEM&/VAGINAL OVOIDS                                 UTERINE T

ANDEM AND VAGINAL



                                                                OVOID FOR CLINIC

AL



                                                                BRACHYTHERAPY;  

Surgeon:



                                                                Angeles Ybarra MD;



                                                                Location:  [facility]

AD ONC CT



                                                                [facility];  Service

: RADIATION



                                                                ONCOLOGY

 

                MN ULTRASOUND GUIDANCE NEEDLE 2020      N/A             Pr

ocedure: INTRAOPERATIVE



                PLACEMENT                                       ULTRASOUND FOR B

RACHYTHERAPY;



                                                                 Surgeon: Angeles Ybarra MD;



                                                                 Location:  

RAD ONC CT



                                                                [facility];  Service

: RADIATION



                                                                ONCOLOGY

 

                MN PELVIC EXAMINATION W 2020      Vagina /N/A     Procedur

e: PELVIC EXAMINATION



                ANESTH                                          UNDER ANESTHESIA

;  Surgeon:



                                                                Angeles Ybarra MD;



                                                                Location:  [facility]

AD ONC CT



                                                                [facility];  Service

: RADIATION



                                                                ONCOLOGY

 

                MN CHG HDR RDNCL 2020      N/A             Procedure: PARRISH

TE



                NTRSTL/INTRCAV BRACHYTX 2-12                                 AFT

ERLOADING HDR RADIONUCLIDE



                CHANNEL                                         BRACHYTHERAPY,2-

12 CHANNELS;



                                                                Surgeon: Angeles mckeon MD;



                                                                Location: [facility]

AD ONC CT



                                                                [facility];  Service

: RADIATION



                                                                ONCOLOGY

 

                MN CHG SET RADN THERAPY FIELD 2020      N/A             Pr

ocedure: CT SIMULATION;



                COMPLEX                                         Surgeon: Angeles mckeon MD;



                                                                Location: [facility]

AD ONC CT



                                                                [facility];  Service

: RADIATION



                                                                ONCOLOGY

 

                MN INSERTION UTERINE 2020      N/A             Procedure: 

(CT) INSERTION OF



                TANDEM&/VAGINAL OVOIDS                                 UTERINE T

ANDEM AND VAGINAL



                                                                OVOID FOR CLINIC

AL



                                                                BRACHYTHERAPY;  

Surgeon:



                                                                Angeles Ybarra MD;



                                                                Location: [facility] 

AD ONC CT



                                                                [facility];  Service

: RADIATION



                                                                ONCOLOGY

 

                MN PELVIC EXAMINATION W 2020      Vagina /N/A     Procedur

e: PELVIC EXAMINATION



                ANESTH                                          UNDER ANESTHESIA

;  Surgeon:



                                                                Angeles Ybarra MD;



                                                                Location: [facility]

AD ONC CT



                                                                [facility];  Service

: RADIATION



                                                                ONCOLOGY

 

                MN CHG HDR RDNCL 2020      N/A             Procedure: PARRISH

TE



                NTRSTL/INTRCAV BRACHYTX 2-12                                 AFT

ERLOADING HDR RADIONUCLIDE



                CHANNEL                                         BRACHYTHERAPY,2-

12 CHANNELS;



                                                                Surgeon: [place] mckeon MD;



                                                                Location:  

AD ONC CT



                                                                [facility];  Service

: RADIATION



                                                                ONCOLOGY

 

                MN CHG SET RADN THERAPY FIELD 2020      N/A             Pr

ocedure: CT SIMULATION;



                COMPLEX                                         Surgeon: Angeles mckeon MD;



                                                                Location: [facility]

AD ONC CT



                                                                [facility];  Service

: RADIATION



                                                                ONCOLOGY

 

                MN INSERTION UTERINE 2021        Vagina /N/A     Procedure: 

(CT) INSERTION OF



                TANDEM&/VAGINAL OVOIDS                                 UTERINE T

ANDEM AND VAGINAL



                                                                OVOID FOR CLINIC

AL



                                                                BRACHYTHERAPY;  

Surgeon:



                                                                Angeles Ybarra MD;



                                                                Location: [facility] 

AD ONC CT



                                                                [facility];  Service

: RADIATION



                                                                ONCOLOGY

 

                MN PELVIC EXAMINATION W 2021        Vagina /N/A     Procedur

e: PELVIC EXAMINATION



                ANESTH                                          UNDER ANESTHESIA

;  Surgeon:



                                                                Angeles Ybarra MD;



                                                                Location: [facility]

AD ONC CT



                                                                [facility];  Service

: RADIATION



                                                                ONCOLOGY

 

                MN CHG HDR RDNCL 2021        N/A             Procedure: PARRISH

TE



                NTRSTL/INTRCAV BRACHYTX 2-12                                 AFT

ERLOADING HDR RADIONUCLIDE



                CHANNEL                                         BRACHYTHERAPY,2-

12 CHANNELS;



                                                                Surgeon: Angeles mckeon MD;



                                                                Location: [facility]

AD ONC CT



                                                                [facility];  Service

: RADIATION



                                                                ONCOLOGY

 

                MN CHG SET RADN THERAPY FIELD 2021        N/A             Pr

ocedure: CT SIMULATION;



                COMPLEX                                         Surgeon: Angeles mckeon MD;



                                                                Location: [facility]

AD ONC CT



                                                                [facility];  Service

: RADIATION



                                                                ONCOLOGY

 

                MN INSERTION UTERINE 2021       N/A             Procedure: 

(CT) INSERTION OF



                TANDEM&/VAGINAL OVOIDS                                 UTERINE T

ANDEM AND VAGINAL



                                                                OVOID FOR CLINIC

AL



                                                                BRACHYTHERAPY;  

Surgeon:



                                                                Paula Mchugh MD;  

Location:



                                                                [facility] RAD ONC CT 

BR[facility];



                                                                Service: RADIATI

ON ONCOLOGY

 

                MN PELVIC EXAMINATION W 2021       Vagina /N/A     Procedur

e: PELVIC EXAMINATION



                ANESTH                                          UNDER ANESTHESIA

;  Surgeon:



                                                                Paula Mchugh MD;  

Location:



                                                                [facility] RAD ONC CT 

BR[facility];



                                                                Service: RADIATI

ON ONCOLOGY

 

                MN CHG HDR RDNCL 2021       N/A             Procedure: PARRISH

TE



                NTRSTL/INTRCAV BRACHYTX 2-12                                 AFT

ERLOADING HDR RADIONUCLIDE



                CHANNEL                                         BRACHYTHERAPY,2-

12 CHANNELS;



                                                                Surgeon: Paula rankin MD;



                                                                Location: [facility]

AD ONC CT



                                                                [facility];  Service

: RADIATION



                                                                ONCOLOGY

 

                MN CHG SET RADN THERAPY FIELD 2021       N/A             Pr

ocedure: CT SIMULATION;



                COMPLEX                                         Surgeon: Paula rankin MD;



                                                                Location: [facility]

AD ONC CT



                                                                [facility];  Service

: RADIATION



                                                                ONCOLOGY

 

                MN INSERTION UTERINE 2021        N/A             Procedure: 

(CT) INSERTION OF



                TANDEM&/VAGINAL OVOIDS                                 UTERINE T

ANDEM AND VAGINAL



                                                                OVOID FOR CLINIC

AL



                                                                BRACHYTHERAPY;  

Surgeon: Rahel Lopes MD;  Loca

tion: [facility]



                                                                RAD ONC CT [facility]

Y;  Service:



                                                                RADIATION ONCOLO

GY

 

                MN PELVIC EXAMINATION W 2021        Vagina /N/A     Procedur

e: PELVIC EXAMINATION



                ANESTH                                          UNDER ANESTHESIA

;  Surgeon:



                                                                Rahel Lopes MD;  

Location:



                                                                [facility] RAD ONC CT 

BRACH[facility];



                                                                Service: RADIATI

ON ONCOLOGY

 

                MN CHG HDR RDNCL 2021        N/A             Procedure: PARRISH

TE



                NTRSTL/INTRCAV BRACHYTX 2-12                                 AFT

ERLOADING HDR RADIONUCLIDE



                CHANNEL                                         BRACHYTHERAPY,2-

12 CHANNELS;



                                                                Surgeon: Rahel Davis pp, MD;



                                                                Location: [facility]

AD ONC CT



                                                                [facility];  Service

: RADIATION



                                                                ONCOLOGY

 

                MN CHG SET RADN THERAPY FIELD 2021        N/A             Pr

ocedure: CT SIMULATION;



                COMPLEX                                         Surgeon: Rahel Davis pp, MD;



                                                                Location: [facility] ONC CT



                                                                [facility];  Service

: RADIATION



                                                                ONCOLOGY







Medical History







                    Medical History     Date                Comments

 

                    Hypertension                        

 

                    Hepatitis           1994                

 

                    Depressive disorder                 

 

                    Anxiety                             

 

                    Cancer                                  







Family History







                Medical History Relation        Name            Comments

 

                Breast cancer   Maternal Aunt   Yudelka            

 

                Lung cancer     Maternal Aunt   nir           

 

                Lung cancer     Maternal Grandfather zurdo          

 

                Lung cancer     Maternal Uncle  budjuni           

 

                Breast cancer   Paternal Aunt   Ayde           

 

                Lung cancer     Paternal Grandmother rahel             

 

                Cervical cancer Sister          aisha           









                Relation        Name            Status          Comments

 

                Maternal Aunt   Yudelka            Alive           

 

                Maternal Aunt   nir                   

 

                Maternal Grandfather zurdo                  

 

                Maternal Uncle  budjuni                   

 

                Paternal Aunt   Ayde           Alive           

 

                Paternal Grandmother rahel             Alive           

 

                Sister          aisha           Alive           







Social History







             Tobacco Use  Types        Packs/Day    Years Used   Date

 

             Never Smoker              0            0            









                Smokeless Tobacco: Never Used                                 









                    Alcohol Use         Standard Drinks/Week Comments

 

                    Yes                 0 (1 standard drink = 0.6 oz pure alcoho

l) social









                          Sex Assigned at Birth     Date Recorded

 

                          Not on file               









                    Job Start Date      Occupation          Industry

 

                    Not on file         Not on file         Not on file







Obstetrics History







      Grav  Para  Term  Pre   Abrt  (TAB) (SAB) (Ect) Mult  Lvng  Comments

 

      4     2     2           2     1     1                 2     1 normal vagin

al delivery, 7lbs



                                                                  1 , 7

lb 4 ounces









     Date Outcome GA   Total Labor/2nd/3rd Weight Sex  Delivery Anes PTL  Alysha  A

1   A5   Name 

Clin



                    Labor                                                   

 

          Term                                                             

 

          Term                                                             

 

          SAB                                                              

 

          TAB                                                              







Last Filed Vital Signs







                Vital Sign      Reading         Time Taken      Comments

 

                Blood Pressure  148/95          2021  2:46 PM 



                                                CDT             

 

                Pulse           77              2021  2:46 PM 



                                                CDT             

 

                Temperature     36.4 C (97.5 F) 2021  8:56 AM 



                                                CDT             

 

                Respiratory Rate 17              2021  2:46 PM 



                                                CDT             

 

                Oxygen Saturation 98%             2021  2:46 PM 



                                                CDT             

 

                Inhaled Oxygen Concentration -               -               

 

                Weight          90.6 kg (199 lb 11.8 oz) 2021  2:46 PM 



                                                CDT             

 

                Height          168.5 cm (5' 6.34") 2020  8:24 AM 



                                                CST             

 

                Body Mass Index 31.91           2020  8:24 AM 



                                                CST             







Plan of Treatment







             Date         Type         Specialty    Care Team    Description

 

                10/25/2021      Office Visit    Gynecology      Raul Cruz M D



                                        



                                                                [address] [place], [place] [address]

0



                                        



                                                                [phone] (Fax) 

 

                2022      Appointment     Lab             Haley Pearson NP



                                        



                                                                [address] [place], [place] [address]

0



                                        



                                                                [phone] (Fax) 

 

                2022      Appointment     Radiation Oncology Jayashree Ybarra MD



                                        



                                                                [address] [place], [place] [address]

0



                                        



                                                                [phone] (Fax) 









                Health Maintenance Due Date        Last Done       Comments

 

                COVID-19 Vaccination (1) 1984                      







Procedures







             Procedure Name Priority     Date/Time    Associated Diagnosis Comme

nts

 

             OSI MAMMO BILATERAL Routine      2021  4:33 Cancer       Resu

lts for this



                                       PM CDT                    procedure are i

n



                                                                 the results



                                                                 section.

 

             OSI MAMMO BILATERAL Routine      2021  4:27 Cancer       Resu

lts for this



                                       PM CDT                    procedure are i

n



                                                                 the results



                                                                 section.

 

             CYTOLOGY HPV 16/18 Routine      2021  3:43 Malignant neoplasm

 Results for this



             GENOTYPING AND HIGH RISK              PM CDT       of cervix uteri,

 not procedure are in



             POOL                                   otherwise specified the resu

lts



                                                                 section.

 

             CYTOLOGY GYN Routine      2021  3:43 Malignant neoplasm Resul

ts for this



             INTERPRETATION              PM CDT       of cervix uteri, not proce

dure are in



                                                    otherwise specified the resu

lts



                                                                 section.

 

             PETCT SUBSEQUENT Routine      2021  9:41 Malignant neoplasm R

esults for this



             TREATMENT STRATEGY              AM CDT       of cervix uteri, not p

rocedure are in



                                                    otherwise specified the resu

lts



                                                                 section.

 

             POC GLUCOSE SCREEN Routine      2021  8:07              Resul

ts for this



                                       AM CDT                    procedure are i

n



                                                                 the results



                                                                 section.

 

             PETCT CONTRAST ENHANCED Routine      2021  9:06 Malignant alex

plasm Results for 

this



             SUBSEQUENT TREATMENT              AM CDT       of cervix uteri, not

 procedure are in



                STRATEGY                                        otherwise specif

ied



                                        the results



                                                    Malignant neoplasm section.



                                                    of endometrium 

 

             POC GLUCOSE SCREEN Routine      2021  7:28              Resul

ts for this



                                       AM CDT                    procedure are i

n



                                                                 the results



                                                                 section.

 

             POC CREATININE Routine      2021  7:28              Results f

or this



                                       AM CDT                    procedure are i

n



                                                                 the results



                                                                 section.

 

             MRI PELVIS W WO CONTRAST Routine      2021  6:17 Pain in unsp

ecified Results 

for this



             - MUSCULOSKELETAL              PM CDT       hip          procedure 

are in



                                                                 the results



                                                                 section.

 

             XR HIP 2 VW BILATERAL W Routine      2021  2:32 Pain in unspe

cified Results for

this



             PELVIS                    PM CDT       hip          procedure are i

n



                                                                 the results



                                                                 section.

 

             MRI PELVIS W WO CONTRAST Routine      2021  7:18 Malignant ne

oplasm Results for

this



             - GYN                     AM CST       of overlapping sites procedu

re are in



                                                    of cervix uteri the results



                                                                 section.

 

             CT SIMULATION              2021  7:53 Malignant neoplasm 



                                       AM CST       of cervix uteri, not 



                                                    otherwise specified 

 

             REMOTE AFTERLOADING HDR              2021  7:53 Malignant alex

plasm 



             RADIONUCLIDE              AM CST       of cervix uteri, not 



             BRACHYTHERAPY,2-12                           otherwise specified 



             CHANNELS                                            

 

             PELVIC EXAMINATION UNDER              2021  7:53 Malignant ne

oplasm 



             ANESTHESIA                AM CST       of cervix uteri, not 



                                                    otherwise specified 

 

             (CT) INSERTION OF              2021  7:53 Malignant neoplasm 



             UTERINE TANDEM AND              AM CST       of cervix uteri, not 



             VAGINAL OVOID FOR                           otherwise specified 



             CLINICAL BRACHYTHERAPY                                        

 

             COVID-19 (SARS-COV-2) Routine      01/10/2021 10:45 Encounter for R

esults for this



             PCR-ASYMPTOMATIC MC              AM CST       observation for proce

dure are in



                                                    other suspected the results



                                                    exposure to  section.



                                                    biological agent 



                                                    ruled out    

 

             CT SIMULATION              2021  7:26 Malignant neoplasm 



                                       AM CST       of cervix uteri, not 



                                                    otherwise specified 

 

             REMOTE AFTERLOADING HDR              2021  7:26 Malignant alex

plasm 



             RADIONUCLIDE              AM CST       of cervix uteri, not 



             BRACHYTHERAPY,2-12                           otherwise specified 



             CHANNELS                                            

 

             PELVIC EXAMINATION UNDER              2021  7:26 Malignant ne

oplasm 



             ANESTHESIA                AM CST       of cervix uteri, not 



                                                    otherwise specified 

 

             (CT) INSERTION OF              2021  7:26 Malignant neoplasm 



             UTERINE TANDEM AND              AM CST       of cervix uteri, not 



             VAGINAL OVOID FOR                           otherwise specified 



             CLINICAL BRACHYTHERAPY                                        

 

              2019-NCOV COVID-19 Routine      2021 11:44 Suspected COVID

-19 Results for 

this



                                       AM CST                    procedure are i

n



                                                                 the results



                                                                 section.

 

             BLOOD UREA NITROGEN Routine      2021 11:21 Malignant neoplas

m Results for this



                                       AM CST       of cervix uteri, not procedu

re are in



                                                    otherwise specified the resu

lts



                                                                 section.

 

             CALCIUM LEVEL TOTAL Routine      2021 11:21 Malignant neoplas

m Results for this



                                       AM CST       of cervix uteri, not procedu

re are in



                                                    otherwise specified the resu

lts



                                                                 section.

 

             .GLOMERULAR FILTRATION Routine      2021 11:21 Malignant neop

lasm Results for 

this



             RATE                      AM CST       of cervix uteri, not procedu

re are in



                                                    otherwise specified the resu

lts



                                                                 section.

 

             SERUM CREATININE Routine      2021 11:21 Malignant neoplasm R

esults for this



                                       AM CST       of cervix uteri, not procedu

re are in



                                                    otherwise specified the resu

lts



                                                                 section.

 

             ELECTROLYTE PANEL Routine      2021 11:21 Malignant neoplasm 

Results for this



                                       AM CST       of cervix uteri, not procedu

re are in



                                                    otherwise specified the resu

lts



                                                                 section.

 

             GLUCOSE LEVEL Routine      2021 11:21 Malignant neoplasm Resu

lts for this



                                       AM CST       of cervix uteri, not procedu

re are in



                                                    otherwise specified the resu

lts



                                                                 section.

 

             MANUAL DIFFERENTIAL Routine      2021 11:21 Malignant neoplas

m Results for this



                                       AM CST       of cervix uteri, not procedu

re are in



                                                    otherwise specified the resu

lts



                                                                 section.

 

             Results CBC  Routine      2021 11:21 Malignant neoplasm Resul

ts for this



                                       AM CST       of cervix uteri, not procedu

re are in



                                                    otherwise specified the resu

lts



                                                                 section.

 

             MAGNESIUM LEVEL Routine      2021 11:21 Malignant neoplasm Re

sults for this



                                       AM CST       of cervix uteri, not procedu

re are in



                                                    otherwise specified the resu

lts



                                                                 section.

 

             ASPARTATE    Routine      2021 11:21 Malignant neoplasm Resul

ts for this



             AMINOTRANSFERASE              AM CST       of cervix uteri, not pro

cedure are in



                                                    otherwise specified the resu

lts



                                                                 section.

 

             ALANINE AMINOTRANSFERASE Routine      2021 11:21 Malignant ne

oplasm Results for

this



                                       AM CST       of cervix uteri, not procedu

re are in



                                                    otherwise specified the resu

lts



                                                                 section.

 

             BILIRUBIN TOTAL Routine      2021 11:21 Malignant neoplasm Re

sults for this



                                       AM CST       of cervix uteri, not procedu

re are in



                                                    otherwise specified the resu

lts



                                                                 section.

 

             BASIC METABOLIC PANEL, Routine      2021 11:21 Malignant neop

lasm 



             CALCIUM TOTAL              AM CST       of cervix uteri, not 



                                                    otherwise specified 

 

             COMPLETE BLOOD COUNT W/ Routine      2021 11:21 Malignant alex

plasm 



             DIFFERENTIAL              AM CST       of cervix uteri, not 



                                                    otherwise specified 

 

             CT SIMULATION              2021  7:37 Malignant neoplasm 



                                       AM CST       of cervix uteri, not 



                                                    otherwise specified 

 

             REMOTE AFTERLOADING HDR              2021  7:37 Malignant alex

plasm 



             RADIONUCLIDE              AM CST       of cervix uteri, not 



             BRACHYTHERAPY,2-12                           otherwise specified 



             CHANNELS                                            

 

             PELVIC EXAMINATION UNDER              2021  7:37 Malignant ne

oplasm 



             ANESTHESIA                AM CST       of cervix uteri, not 



                                                    otherwise specified 

 

             (CT) INSERTION OF              2021  7:37 Malignant neoplasm 



             UTERINE TANDEM AND              AM CST       of cervix uteri, not 



             VAGINAL OVOID FOR                           otherwise specified 



             CLINICAL BRACHYTHERAPY                                        

 

             COVID-19 (SARS-COV-2) Routine      2021  9:52 Encounter for R

esults for this



             PCR-ASYMPTOMATIC MC              AM CST       observation for proce

dure are in



                                                    other suspected the results



                                                    exposure to  section.



                                                    biological agent 



                                                    ruled out    

 

             BLOOD UREA NITROGEN Routine      2020 11:13 Malignant neoplas

m Results for this



                                       AM CST       of cervix uteri, not procedu

re are in



                                                    otherwise specified the resu

lts



                                                                 section.

 

             CALCIUM LEVEL TOTAL Routine      2020 11:13 Malignant neoplas

m Results for this



                                       AM CST       of cervix uteri, not procedu

re are in



                                                    otherwise specified the resu

lts



                                                                 section.

 

             .GLOMERULAR FILTRATION Routine      2020 11:13 Malignant neop

lasm Results for 

this



             RATE                      AM CST       of cervix uteri, not procedu

re are in



                                                    otherwise specified the resu

lts



                                                                 section.

 

             SERUM CREATININE Routine      2020 11:13 Malignant neoplasm R

esults for this



                                       AM CST       of cervix uteri, not procedu

re are in



                                                    otherwise specified the resu

lts



                                                                 section.

 

             ELECTROLYTE PANEL Routine      2020 11:13 Malignant neoplasm 

Results for this



                                       AM CST       of cervix uteri, not procedu

re are in



                                                    otherwise specified the resu

lts



                                                                 section.

 

             GLUCOSE LEVEL Routine      2020 11:13 Malignant neoplasm Resu

lts for this



                                       AM CST       of cervix uteri, not procedu

re are in



                                                    otherwise specified the resu

lts



                                                                 section.

 

             MANUAL DIFFERENTIAL Routine      2020 11:13 Malignant neoplas

m Results for this



                                       AM CST       of cervix uteri, not procedu

re are in



                                                    otherwise specified the resu

lts



                                                                 section.

 

             Results CBC  Routine      2020 11:13 Malignant neoplasm Resul

ts for this



                                       AM CST       of cervix uteri, not procedu

re are in



                                                    otherwise specified the resu

lts



                                                                 section.

 

             MAGNESIUM LEVEL Routine      2020 11:13 Malignant neoplasm Re

sults for this



                                       AM CST       of cervix uteri, not procedu

re are in



                                                    otherwise specified the resu

lts



                                                                 section.

 

             ASPARTATE    Routine      2020 11:13 Malignant neoplasm Resul

ts for this



             AMINOTRANSFERASE              AM CST       of cervix uteri, not pro

cedure are in



                                                    otherwise specified the resu

lts



                                                                 section.

 

             ALANINE AMINOTRANSFERASE Routine      2020 11:13 Malignant ne

oplasm Results for

this



                                       AM CST       of cervix uteri, not procedu

re are in



                                                    otherwise specified the resu

lts



                                                                 section.

 

             BILIRUBIN TOTAL Routine      2020 11:13 Malignant neoplasm Re

sults for this



                                       AM CST       of cervix uteri, not procedu

re are in



                                                    otherwise specified the resu

lts



                                                                 section.

 

             BASIC METABOLIC PANEL, Routine      2020 11:13 Malignant neop

lasm 



             CALCIUM TOTAL              AM CST       of cervix uteri, not 



                                                    otherwise specified 

 

             COMPLETE BLOOD COUNT W/ Routine      2020 11:13 Malignant alex

plasm 



             DIFFERENTIAL              AM CST       of cervix uteri, not 



                                                    otherwise specified 

 

             CT SIMULATION              2020  7:31 Malignant neoplasm 



                                       AM CST       of cervix uteri, not 



                                                    otherwise specified 

 

             REMOTE AFTERLOADING HDR              2020  7:31 Malignant alex

plasm 



             RADIONUCLIDE              AM CST       of cervix uteri, not 



             BRACHYTHERAPY,2-12                           otherwise specified 



             CHANNELS                                            

 

             PELVIC EXAMINATION UNDER              2020  7:31 Malignant ne

oplasm 



             ANESTHESIA                AM CST       of cervix uteri, not 



                                                    otherwise specified 

 

             (CT) INSERTION OF              2020  7:31 Malignant neoplasm 



             UTERINE TANDEM AND              AM CST       of cervix uteri, not 



             VAGINAL OVOID FOR                           otherwise specified 



             CLINICAL BRACHYTHERAPY                                        

 

             POC GLUCOSE SCREEN Routine      2020  7:14              Resul

ts for this



                                       AM CST                    procedure are i

n



                                                                 the results



                                                                 section.

 

             COVID-19 (SARS-COV-2) Routine      2020  9:54 Encounter for R

esults for this



             PCR-ASYMPTOMATIC MC              AM CST       observation for proce

dure are in



                                                    other suspected the results



                                                    exposure to  section.



                                                    biological agent 



                                                    ruled out    

 

             BLOOD UREA NITROGEN Routine      2020 11:56 Malignant neoplas

m Results for this



                                       AM CST       of cervix uteri, not procedu

re are in



                                                    otherwise specified the resu

lts



                                                                 section.

 

             CALCIUM LEVEL TOTAL Routine      2020 11:56 Malignant neoplas

m Results for this



                                       AM CST       of cervix uteri, not procedu

re are in



                                                    otherwise specified the resu

lts



                                                                 section.

 

             .GLOMERULAR FILTRATION Routine      2020 11:56 Malignant neop

lasm Results for 

this



             RATE                      AM CST       of cervix uteri, not procedu

re are in



                                                    otherwise specified the resu

lts



                                                                 section.

 

             SERUM CREATININE Routine      2020 11:56 Malignant neoplasm R

esults for this



                                       AM CST       of cervix uteri, not procedu

re are in



                                                    otherwise specified the resu

lts



                                                                 section.

 

             ELECTROLYTE PANEL Routine      2020 11:56 Malignant neoplasm 

Results for this



                                       AM CST       of cervix uteri, not procedu

re are in



                                                    otherwise specified the resu

lts



                                                                 section.

 

             GLUCOSE LEVEL Routine      2020 11:56 Malignant neoplasm Resu

lts for this



                                       AM CST       of cervix uteri, not procedu

re are in



                                                    otherwise specified the resu

lts



                                                                 section.

 

             MANUAL DIFFERENTIAL Routine      2020 11:56 Malignant neoplas

m Results for this



                                       AM CST       of cervix uteri, not procedu

re are in



                                                    otherwise specified the resu

lts



                                                                 section.

 

             Results CBC  Routine      2020 11:56 Malignant neoplasm Resul

ts for this



                                       AM CST       of cervix uteri, not procedu

re are in



                                                    otherwise specified the resu

lts



                                                                 section.

 

             MAGNESIUM LEVEL Routine      2020 11:56 Malignant neoplasm Re

sults for this



                                       AM CST       of cervix uteri, not procedu

re are in



                                                    otherwise specified the resu

lts



                                                                 section.

 

             ASPARTATE    Routine      2020 11:56 Malignant neoplasm Resul

ts for this



             AMINOTRANSFERASE              AM CST       of cervix uteri, not pro

cedure are in



                                                    otherwise specified the resu

lts



                                                                 section.

 

             ALANINE AMINOTRANSFERASE Routine      2020 11:56 Malignant ne

oplasm Results for

this



                                       AM CST       of cervix uteri, not procedu

re are in



                                                    otherwise specified the resu

lts



                                                                 section.

 

             BILIRUBIN TOTAL Routine      2020 11:56 Malignant neoplasm Re

sults for this



                                       AM CST       of cervix uteri, not procedu

re are in



                                                    otherwise specified the resu

lts



                                                                 section.

 

             BASIC METABOLIC PANEL, Routine      2020 11:56 Malignant neop

lasm 



             CALCIUM TOTAL              AM CST       of cervix uteri, not 



                                                    otherwise specified 

 

             COMPLETE BLOOD COUNT W/ Routine      2020 11:56 Malignant alex

plasm 



             DIFFERENTIAL              AM CST       of cervix uteri, not 



                                                    otherwise specified 

 

             ULTRASOUND GUIDANCE - Routine      2020  8:55              Re

sults for this



             INTRAOPERATIVE              AM CST                    procedure are

 in



                                                                 the results



                                                                 section.

 

             CT SIMULATION              2020  7:38 Malignant neoplasm 



                                       AM CST       of cervix uteri, not 



                                                    otherwise specified 

 

             REMOTE AFTERLOADING HDR              2020  7:38 Malignant alex

plasm 



             RADIONUCLIDE              AM CST       of cervix uteri, not 



             BRACHYTHERAPY,2-12                           otherwise specified 



             CHANNELS                                            

 

             PELVIC EXAMINATION UNDER              2020  7:38 Malignant ne

oplasm 



             ANESTHESIA                AM CST       of cervix uteri, not 



                                                    otherwise specified 

 

             INTRAOPERATIVE              2020  7:38 Malignant neoplasm 



             ULTRASOUND FOR              AM CST       of cervix uteri, not 



             BRACHYTHERAPY                           otherwise specified 

 

             (CT) INSERTION OF              2020  7:38 Malignant neoplasm 



             UTERINE TANDEM AND              AM CST       of cervix uteri, not 



             VAGINAL OVOID FOR                           otherwise specified 



             CLINICAL BRACHYTHERAPY                                        

 

             HC 2019-NCOV COVID-19 Routine      2020 11:57 Suspected COVID

-19 Results for 

this



                                       AM CST                    procedure are i

n



                                                                 the results



                                                                 section.

 

             MRI PELVIS W WO CONTRAST Routine      2020  7:23 Malignant ne

oplasm Results for

this



             - GYN                     PM CST       of cervix uteri, not procedu

re are in



                                                    otherwise specified the resu

lts



                                                                 section.

 

             CALCIUM LEVEL TOTAL Routine      2020  9:51 Malignant neoplas

m Results for this



                                       AM CST       of cervix uteri, not procedu

re are in



                                                    otherwise specified the resu

lts



                                                                 section.

 

             .GLOMERULAR FILTRATION Routine      2020  9:51 Malignant neop

lasm Results for 

this



             RATE                      AM CST       of cervix uteri, not procedu

re are in



                                                    otherwise specified the resu

lts



                                                                 section.

 

             SERUM CREATININE Routine      2020  9:51 Malignant neoplasm R

esults for this



                                       AM CST       of cervix uteri, not procedu

re are in



                                                    otherwise specified the resu

lts



                                                                 section.

 

             ELECTROLYTE PANEL Routine      2020  9:51 Malignant neoplasm 

Results for this



                                       AM CST       of cervix uteri, not procedu

re are in



                                                    otherwise specified the resu

lts



                                                                 section.

 

             BLOOD UREA NITROGEN Routine      2020  9:51 Malignant neoplas

m Results for this



                                       AM CST       of cervix uteri, not procedu

re are in



                                                    otherwise specified the resu

lts



                                                                 section.

 

             MANUAL DIFFERENTIAL Routine      2020  9:51 Malignant neoplas

m Results for this



                                       AM CST       of cervix uteri, not procedu

re are in



                                                    otherwise specified the resu

lts



                                                                 section.

 

             GLUCOSE LEVEL Routine      2020  9:51 Malignant neoplasm Resu

lts for this



                                       AM CST       of cervix uteri, not procedu

re are in



                                                    otherwise specified the resu

lts



                                                                 section.

 

             Results CBC  Routine      2020  9:51 Malignant neoplasm Resul

ts for this



                                       AM CST       of cervix uteri, not procedu

re are in



                                                    otherwise specified the resu

lts



                                                                 section.

 

             BASIC METABOLIC PANEL, Routine      2020  9:51 Malignant neop

lasm 



             CALCIUM TOTAL              AM CST       of cervix uteri, not 



                                                    otherwise specified 

 

             COMPLETE BLOOD COUNT W/ Routine      2020  9:51 Malignant alex

plasm 



             DIFFERENTIAL              AM CST       of cervix uteri, not 



                                                    otherwise specified 

 

             MAGNESIUM LEVEL Routine      2020  9:51 Malignant neoplasm Re

sults for this



                                       AM CST       of cervix uteri, not procedu

re are in



                                                    otherwise specified the resu

lts



                                                                 section.

 

             EKG, 12-LEAD (SCHEDULED) Routine      2020   Encounter for ot

her 



                                                    preprocedural 



                                                    examination  

 

             BLOOD UREA NITROGEN Routine      2020  4:01 Malignant neoplas

m Results for this



                                       PM CST       of cervix uteri, not procedu

re are in



                                                    otherwise specified the resu

lts



                                                                 section.

 

             CALCIUM LEVEL TOTAL Routine      2020  4:01 Malignant neoplas

m Results for this



                                       PM CST       of cervix uteri, not procedu

re are in



                                                    otherwise specified the resu

lts



                                                                 section.

 

             .GLOMERULAR FILTRATION Routine      2020  4:01 Malignant neop

lasm Results for 

this



             RATE                      PM CST       of cervix uteri, not procedu

re are in



                                                    otherwise specified the resu

lts



                                                                 section.

 

             SERUM CREATININE Routine      2020  4:01 Malignant neoplasm R

esults for this



                                       PM CST       of cervix uteri, not procedu

re are in



                                                    otherwise specified the resu

lts



                                                                 section.

 

             ELECTROLYTE PANEL Routine      2020  4:01 Malignant neoplasm 

Results for this



                                       PM CST       of cervix uteri, not procedu

re are in



                                                    otherwise specified the resu

lts



                                                                 section.

 

             GLUCOSE LEVEL Routine      2020  4:01 Malignant neoplasm Resu

lts for this



                                       PM CST       of cervix uteri, not procedu

re are in



                                                    otherwise specified the resu

lts



                                                                 section.

 

             MANUAL DIFFERENTIAL Routine      2020  4:01 Malignant neoplas

m Results for this



                                       PM CST       of cervix uteri, not procedu

re are in



                                                    otherwise specified the resu

lts



                                                                 section.

 

             Results CBC  Routine      2020  4:01 Malignant neoplasm Resul

ts for this



                                       PM CST       of cervix uteri, not procedu

re are in



                                                    otherwise specified the resu

lts



                                                                 section.

 

             MAGNESIUM LEVEL Routine      2020  4:01 Malignant neoplasm Re

sults for this



                                       PM CST       of cervix uteri, not procedu

re are in



                                                    otherwise specified the resu

lts



                                                                 section.

 

             ASPARTATE    Routine      2020  4:01 Malignant neoplasm Resul

ts for this



             AMINOTRANSFERASE              PM CST       of cervix uteri, not pro

cedure are in



                                                    otherwise specified the resu

lts



                                                                 section.

 

             ALANINE AMINOTRANSFERASE Routine      2020  4:01 Malignant ne

oplasm Results for

this



                                       PM CST       of cervix uteri, not procedu

re are in



                                                    otherwise specified the resu

lts



                                                                 section.

 

             BILIRUBIN TOTAL Routine      2020  4:01 Malignant neoplasm Re

sults for this



                                       PM CST       of cervix uteri, not procedu

re are in



                                                    otherwise specified the resu

lts



                                                                 section.

 

             BASIC METABOLIC PANEL, Routine      2020  4:01 Malignant neop

lasm 



             CALCIUM TOTAL              PM CST       of cervix uteri, not 



                                                    otherwise specified 

 

             COMPLETE BLOOD COUNT W/ Routine      2020  4:01 Malignant alex

plasm 



             DIFFERENTIAL              PM CST       of cervix uteri, not 



                                                    otherwise specified 

 

             CALCIUM LEVEL TOTAL Routine      2020  2:41 Malignant neoplas

m Results for this



                                       PM CST       of cervix uteri, not procedu

re are in



                                                    otherwise specified the resu

lts



                                                                 section.

 

             .GLOMERULAR FILTRATION Routine      2020  2:41 Malignant neop

lasm Results for 

this



             RATE                      PM CST       of cervix uteri, not procedu

re are in



                                                    otherwise specified the resu

lts



                                                                 section.

 

             SERUM CREATININE Routine      2020  2:41 Malignant neoplasm R

esults for this



                                       PM CST       of cervix uteri, not procedu

re are in



                                                    otherwise specified the resu

lts



                                                                 section.

 

             ELECTROLYTE PANEL Routine      2020  2:41 Malignant neoplasm 

Results for this



                                       PM CST       of cervix uteri, not procedu

re are in



                                                    otherwise specified the resu

lts



                                                                 section.

 

             BLOOD UREA NITROGEN Routine      2020  2:41 Malignant neoplas

m Results for this



                                       PM CST       of cervix uteri, not procedu

re are in



                                                    otherwise specified the resu

lts



                                                                 section.

 

             GLUCOSE LEVEL Routine      2020  2:41 Malignant neoplasm Resu

lts for this



                                       PM CST       of cervix uteri, not procedu

re are in



                                                    otherwise specified the resu

lts



                                                                 section.

 

             MANUAL DIFFERENTIAL Routine      2020  2:41 Malignant neoplas

m Results for this



                                       PM CST       of cervix uteri, not procedu

re are in



                                                    otherwise specified the resu

lts



                                                                 section.

 

             Results CBC  Routine      2020  2:41 Malignant neoplasm Resul

ts for this



                                       PM CST       of cervix uteri, not procedu

re are in



                                                    otherwise specified the resu

lts



                                                                 section.

 

             MAGNESIUM LEVEL Routine      2020  2:41 Malignant neoplasm Re

sults for this



                                       PM CST       of cervix uteri, not procedu

re are in



                                                    otherwise specified the resu

lts



                                                                 section.

 

             ASPARTATE    Routine      2020  2:41 Malignant neoplasm Resul

ts for this



             AMINOTRANSFERASE              PM CST       of cervix uteri, not pro

cedure are in



                                                    otherwise specified the resu

lts



                                                                 section.

 

             ALANINE AMINOTRANSFERASE Routine      2020  2:41 Malignant ne

oplasm Results for

this



                                       PM CST       of cervix uteri, not procedu

re are in



                                                    otherwise specified the resu

lts



                                                                 section.

 

             BILIRUBIN TOTAL Routine      2020  2:41 Malignant neoplasm Re

sults for this



                                       PM CST       of cervix uteri, not procedu

re are in



                                                    otherwise specified the resu

lts



                                                                 section.

 

             BASIC METABOLIC PANEL, Routine      2020  2:41 Malignant neop

lasm 



             CALCIUM TOTAL              PM CST       of cervix uteri, not 



                                                    otherwise specified 

 

             COMPLETE BLOOD COUNT W/ Routine      2020  2:41 Malignant alex

plasm 



             DIFFERENTIAL              PM CST       of cervix uteri, not 



                                                    otherwise specified 

 

             BLOOD UREA NITROGEN Routine      2020  3:52 Malignant neoplas

m Results for this



                                       PM CST       of cervix uteri, not procedu

re are in



                                                    otherwise specified the resu

lts



                                                                 section.

 

             MANUAL DIFFERENTIAL Routine      2020  3:52 Malignant neoplas

m Results for this



                                       PM CST       of cervix uteri, not procedu

re are in



                                                    otherwise specified the resu

lts



                                                                 section.

 

             Results CBC  Routine      2020  3:52 Malignant neoplasm Resul

ts for this



                                       PM CST       of cervix uteri, not procedu

re are in



                                                    otherwise specified the resu

lts



                                                                 section.

 

             CALCIUM LEVEL TOTAL Routine      2020  3:52 Malignant neoplas

m Results for this



                                       PM CST       of cervix uteri, not procedu

re are in



                                                    otherwise specified the resu

lts



                                                                 section.

 

             .GLOMERULAR FILTRATION Routine      2020  3:52 Malignant neop

lasm Results for 

this



             RATE                      PM CST       of cervix uteri, not procedu

re are in



                                                    otherwise specified the resu

lts



                                                                 section.

 

             SERUM CREATININE Routine      2020  3:52 Malignant neoplasm R

esults for this



                                       PM CST       of cervix uteri, not procedu

re are in



                                                    otherwise specified the resu

lts



                                                                 section.

 

             ELECTROLYTE PANEL Routine      2020  3:52 Malignant neoplasm 

Results for this



                                       PM CST       of cervix uteri, not procedu

re are in



                                                    otherwise specified the resu

lts



                                                                 section.

 

             GLUCOSE LEVEL Routine      2020  3:52 Malignant neoplasm Resu

lts for this



                                       PM CST       of cervix uteri, not procedu

re are in



                                                    otherwise specified the resu

lts



                                                                 section.

 

             COMPLETE BLOOD COUNT W/ Routine      2020  3:52 Malignant alex

plasm 



             DIFFERENTIAL              PM CST       of cervix uteri, not 



                                                    otherwise specified 

 

             MAGNESIUM LEVEL Routine      2020  3:52 Malignant neoplasm Re

sults for this



                                       PM CST       of cervix uteri, not procedu

re are in



                                                    otherwise specified the resu

lts



                                                                 section.

 

             ASPARTATE    Routine      2020  3:52 Malignant neoplasm Resul

ts for this



             AMINOTRANSFERASE              PM CST       of cervix uteri, not pro

cedure are in



                                                    otherwise specified the resu

lts



                                                                 section.

 

             ALANINE AMINOTRANSFERASE Routine      2020  3:52 Malignant ne

oplasm Results for

this



                                       PM CST       of cervix uteri, not procedu

re are in



                                                    otherwise specified the resu

lts



                                                                 section.

 

             BILIRUBIN TOTAL Routine      2020  3:52 Malignant neoplasm Re

sults for this



                                       PM CST       of cervix uteri, not procedu

re are in



                                                    otherwise specified the resu

lts



                                                                 section.

 

             BASIC METABOLIC PANEL, Routine      2020  3:52 Malignant neop

lasm 



             CALCIUM TOTAL              PM CST       of cervix uteri, not 



                                                    otherwise specified 

 

             CALCIUM LEVEL TOTAL Routine      2020  2:43 Malignant neoplas

m Results for this



                                       PM CST       of cervix uteri, not procedu

re are in



                                                    otherwise specified the resu

lts



                                                                 section.

 

             .GLOMERULAR FILTRATION Routine      2020  2:43 Malignant neop

lasm Results for 

this



             RATE                      PM CST       of cervix uteri, not procedu

re are in



                                                    otherwise specified the resu

lts



                                                                 section.

 

             SERUM CREATININE Routine      2020  2:43 Malignant neoplasm R

esults for this



                                       PM CST       of cervix uteri, not procedu

re are in



                                                    otherwise specified the resu

lts



                                                                 section.

 

             ELECTROLYTE PANEL Routine      2020  2:43 Malignant neoplasm 

Results for this



                                       PM CST       of cervix uteri, not procedu

re are in



                                                    otherwise specified the resu

lts



                                                                 section.

 

             BLOOD UREA NITROGEN Routine      2020  2:43 Malignant neoplas

m Results for this



                                       PM CST       of cervix uteri, not procedu

re are in



                                                    otherwise specified the resu

lts



                                                                 section.

 

             GLUCOSE LEVEL Routine      2020  2:43 Malignant neoplasm Resu

lts for this



                                       PM CST       of cervix uteri, not procedu

re are in



                                                    otherwise specified the resu

lts



                                                                 section.

 

             MANUAL DIFFERENTIAL Routine      2020  2:43 Malignant neoplas

m Results for this



                                       PM CST       of cervix uteri, not procedu

re are in



                                                    otherwise specified the resu

lts



                                                                 section.

 

             Results CBC  Routine      2020  2:43 Malignant neoplasm Resul

ts for this



                                       PM CST       of cervix uteri, not procedu

re are in



                                                    otherwise specified the resu

lts



                                                                 section.

 

             MAGNESIUM LEVEL Routine      2020  2:43 Malignant neoplasm Re

sults for this



                                       PM CST       of cervix uteri, not procedu

re are in



                                                    otherwise specified the resu

lts



                                                                 section.

 

             ASPARTATE    Routine      2020  2:43 Malignant neoplasm Resul

ts for this



             AMINOTRANSFERASE              PM CST       of cervix uteri, not pro

cedure are in



                                                    otherwise specified the resu

lts



                                                                 section.

 

             ALANINE AMINOTRANSFERASE Routine      2020  2:43 Malignant ne

oplasm Results for

this



                                       PM CST       of cervix uteri, not procedu

re are in



                                                    otherwise specified the resu

lts



                                                                 section.

 

             BILIRUBIN TOTAL Routine      2020  2:43 Malignant neoplasm Re

sults for this



                                       PM CST       of cervix uteri, not procedu

re are in



                                                    otherwise specified the resu

lts



                                                                 section.

 

             BASIC METABOLIC PANEL, Routine      2020  2:43 Malignant neop

lasm 



             CALCIUM TOTAL              PM CST       of cervix uteri, not 



                                                    otherwise specified 

 

             COMPLETE BLOOD COUNT W/ Routine      2020  2:43 Malignant alex

plasm 



             DIFFERENTIAL              PM CST       of cervix uteri, not 



                                                    otherwise specified 

 

             PETCT CONTRAST ENHANCED STAT         2020  4:30 Malignant alex

plasm Results for 

this



             INITIAL TREATMENT              PM CST       of cervix uteri, not pr

ocedure are in



                STRATEGY                                        otherwise specif

ied



                                        the results



                                                    Complex endometrial section.



                                                    hyperplasia  

 

             TMP INTERPRETATION Routine      2020  2:12              Resul

ts for this



             ANTIBODY SCREEN NEGATIVE              PM CST                    pro

cedure are in



                                                                 the results



                                                                 section.

 

             CLOT EXPIRATION DATE Routine      2020  2:12              Res

ults for this



                                       PM CST                    procedure are i

n



                                                                 the results



                                                                 section.

 

             ANTIBODY SCREEN Routine      2020  2:12 Malignant neoplasm Re

sults for this



                                       PM CST       of cervix uteri, not procedu

re are in



                                                                otherwise specif

ied



                                        the results



                                                    Complex endometrial section.



                                                                hyperplasia



                                        



                                                    Malignant neoplasm 



                                                    of endometrium 

 

             ABORH        Routine      2020  2:12 Malignant neoplasm Resul

ts for this



                                       PM CST       of cervix uteri, not procedu

re are in



                                                                otherwise specif

ied



                                        the results



                                                    Complex endometrial section.



                                                                hyperplasia



                                        



                                                    Malignant neoplasm 



                                                    of endometrium 

 

             APTT         Routine      2020  2:12 Malignant neoplasm Resul

ts for this



                                       PM CST       of cervix uteri, not procedu

re are in



                                                                otherwise specif

ied



                                        the results



                                                    Complex endometrial section.



                                                                hyperplasia



                                        



                                                    Malignant neoplasm 



                                                    of endometrium 

 

             TYPE AND SCREEN Routine      2020  2:12 Malignant neoplasm 



                                       PM CST       of cervix uteri, not 



                                                                otherwise specif

ied



                                        



                                                    Complex endometrial 



                                                                hyperplasia



                                        



                                                    Malignant neoplasm 



                                                    of endometrium 

 

             PROTHROMBIN TIME Routine      2020  2:12 Malignant neoplasm R

esults for this



                                       PM CST       of cervix uteri, not procedu

re are in



                                                                otherwise specif

ied



                                        the results



                                                    Complex endometrial section.



                                                                hyperplasia



                                        



                                                    Malignant neoplasm 



                                                    of endometrium 

 

             CONFIRM ABORH TYPE Routine      2020  2:09              Resul

ts for this



                                       PM CST                    procedure are i

n



                                                                 the results



                                                                 section.

 

             URINALYSIS MICROSCOPIC Routine      2020  2:08              R

esults for this



                                       PM CST                    procedure are i

n



                                                                 the results



                                                                 section.

 

             HUMAN CHORIONIC Routine      2020  2:08 Malignant neoplasm Re

sults for this



             GONADOTROPIN,              PM CST       of cervix uteri, not proced

ure are in



                QUALITATIVE, URINE                                 otherwise spe

cified



                                        the results



                                                    Complex endometrial section.



                                                                hyperplasia



                                        



                                                    Malignant neoplasm 



                                                    of endometrium 

 

             URINALYSIS WITH Routine      2020  2:08 Malignant neoplasm Re

sults for this



             MICROSCOPIC IF INDICATED              PM CST       of cervix uteri,

 not procedure are in



                                                                otherwise specif

ied



                                        the results



                                                    Complex endometrial section.



                                                                hyperplasia



                                        



                                                    Malignant neoplasm 



                                                    of endometrium 

 

             URINE CULTURE Routine      2020  2:08 Malignant neoplasm Resu

lts for this



                                       PM CST       of cervix uteri, not procedu

re are in



                                                                otherwise specif

ied



                                        the results



                                                    Complex endometrial section.



                                                                hyperplasia



                                        



                                                    Malignant neoplasm 



                                                    of endometrium 

 

              2019-NCOV COVID-19 Routine      2020  9:21 Suspected COVID

-19 Results for 

this



                                       AM CST                    procedure are i

n



                                                                 the results



                                                                 section.

 

             IR CT GUIDED BIOPSY Routine      2020  2:42 Malignant neoplas

m Results for this



             MUSCLE/SOFT TISSUE              PM CST       of cervix uteri, not p

rocedure are in



             PELVIC                                 otherwise specified the resu

lts



                                                                 section.

 

             PATHOLOGY BIOPSY Routine      2020  2:24 Malignant neoplasm R

esults for this



             INTERPRETATION              PM CST       of cervix uteri, not proce

dure are in



                                                    otherwise specified the resu

lts



                                                                 section.

 

             PROTHROMBIN TIME Routine      2020 11:41              Results

 for this



                                       AM CST                    procedure are i

n



                                                                 the results



                                                                 section.

 

             HUMAN CHORIONIC Routine      2020 11:31              Results 

for this



             GONADOTROPIN,              AM CST                    procedure are 

in



             QUALITATIVE, URINE                                        the resul

ts



                                                                 section.

 

             US FINE NEEDLE Routine      2020 10:38 Thyroid nodule Results

 for this



             ASPIRATION                AM CST                    procedure are i

n



                                                                 the results



                                                                 section.

 

             US HEAD NECK SOFT TISSUE Routine      2020 10:38 Thyroid nodu

le Results for 

this



                                       AM CST                    procedure are i

n



                                                                 the results



                                                                 section.

 

             CYTOLOGY IMAGE-GUIDED Routine      2020  8:39 Thyroid nodule 

Results for this



             [facility] INTERPRETATION              AM CST                    procedure

 are in



                                                                 the results



                                                                 section.

 

             MRI PELVIS W WO CONTRAST Routine      10/30/2020  4:36 Malignant ne

oplasm Results for

this



             - GYN                     PM CDT       of cervix uteri, not procedu

re are in



                                                                otherwise specif

ied



                                        the results



                                                    Complex endometrial section.



                                                                hyperplasia



                                        



                                                    Malignant neoplasm 



                                                    of endometrium 

 

             CT CHEST W CONTRAST Routine      10/28/2020  7:24 Malignant neoplas

m Results for this



                                       AM CDT       of cervix uteri, not procedu

re are in



                                                                otherwise specif

ied



                                        the results



                                                    Complex endometrial section.



                                                                hyperplasia



                                        



                                                    Malignant neoplasm 



                                                    of endometrium 

 

             PATHOLOGY BIOPSY Routine      10/26/2020 10:20 Malignant neoplasm R

esults for this



             INTERPRETATION              AM CDT       of cervix uteri, not proce

dure are in



                                                    otherwise specified the resu

lts



                                                                 section.

 

             [place] MISCELLANEOUS TEST Routine      10/26/2020 10:16              

Results for this



                                       AM CDT                    procedure are i

n



                                                                 the results



                                                                 section.

 

             TMP HIV 1/2 AG&AB PATH Routine      10/26/2020 10:16              R

esults for this



             INTERP                    AM CDT                    procedure are i

n



                                                                 the results



                                                                 section.

 

             MANUAL DIFFERENTIAL Routine      10/26/2020 10:16 Malignant neoplas

m Results for this



                                       AM CDT       of cervix uteri, not procedu

re are in



                                                                otherwise specif

ied



                                        the results



                                                    Complex endometrial section.



                                                                hyperplasia



                                        



                                                    Malignant neoplasm 



                                                    of endometrium 

 

             Results CBC  Routine      10/26/2020 10:16 Malignant neoplasm Resul

ts for this



                                       AM CDT       of cervix uteri, not procedu

re are in



                                                                otherwise specif

ied



                                        the results



                                                    Complex endometrial section.



                                                                hyperplasia



                                        



                                                    Malignant neoplasm 



                                                    of endometrium 

 

             FRACTIONATED BILIRUBIN Routine      10/26/2020 10:16 Malignant neop

lasm Results for 

this



                                       AM CDT       of cervix uteri, not procedu

re are in



                                                                otherwise specif

ied



                                        the results



                                                    Complex endometrial section.



                                                                hyperplasia



                                        



                                                    Malignant neoplasm 



                                                    of endometrium 

 

             TOTAL PROTEIN Routine      10/26/2020 10:16 Malignant neoplasm Resu

lts for this



                                       AM CDT       of cervix uteri, not procedu

re are in



                                                                otherwise specif

ied



                                        the results



                                                    Complex endometrial section.



                                                                hyperplasia



                                        



                                                    Malignant neoplasm 



                                                    of endometrium 

 

             ASPARTATE    Routine      10/26/2020 10:16 Malignant neoplasm Resul

ts for this



             AMINOTRANSFERASE              AM CDT       of cervix uteri, not pro

cedure are in



                                                                otherwise specif

ied



                                        the results



                                                    Complex endometrial section.



                                                                hyperplasia



                                        



                                                    Malignant neoplasm 



                                                    of endometrium 

 

             ALANINE AMINOTRANSFERASE Routine      10/26/2020 10:16 Malignant ne

oplasm Results for

this



                                       AM CDT       of cervix uteri, not procedu

re are in



                                                                otherwise specif

ied



                                        the results



                                                    Complex endometrial section.



                                                                hyperplasia



                                        



                                                    Malignant neoplasm 



                                                    of endometrium 

 

             ALKALINE PHOSPHATASE Routine      10/26/2020 10:16 Malignant neopla

sm Results for 

this



                                       AM CDT       of cervix uteri, not procedu

re are in



                                                                otherwise specif

ied



                                        the results



                                                    Complex endometrial section.



                                                                hyperplasia



                                        



                                                    Malignant neoplasm 



                                                    of endometrium 

 

             ALBUMIN LEVEL Routine      10/26/2020 10:16 Malignant neoplasm Resu

lts for this



                                       AM CDT       of cervix uteri, not procedu

re are in



                                                                otherwise specif

ied



                                        the results



                                                    Complex endometrial section.



                                                                hyperplasia



                                        



                                                    Malignant neoplasm 



                                                    of endometrium 

 

             CALCIUM LEVEL TOTAL Routine      10/26/2020 10:16 Malignant neoplas

m Results for this



                                       AM CDT       of cervix uteri, not procedu

re are in



                                                                otherwise specif

ied



                                        the results



                                                    Complex endometrial section.



                                                                hyperplasia



                                        



                                                    Malignant neoplasm 



                                                    of endometrium 

 

             .GLOMERULAR FILTRATION Routine      10/26/2020 10:16 Malignant neop

lasm Results for 

this



             RATE                      AM CDT       of cervix uteri, not procedu

re are in



                                                                otherwise specif

ied



                                        the results



                                                    Complex endometrial section.



                                                                hyperplasia



                                        



                                                    Malignant neoplasm 



                                                    of endometrium 

 

             SERUM CREATININE Routine      10/26/2020 10:16 Malignant neoplasm R

esults for this



                                       AM CDT       of cervix uteri, not procedu

re are in



                                                                otherwise specif

ied



                                        the results



                                                    Complex endometrial section.



                                                                hyperplasia



                                        



                                                    Malignant neoplasm 



                                                    of endometrium 

 

             ELECTROLYTE PANEL Routine      10/26/2020 10:16 Malignant neoplasm 

Results for this



                                       AM CDT       of cervix uteri, not procedu

re are in



                                                                otherwise specif

ied



                                        the results



                                                    Complex endometrial section.



                                                                hyperplasia



                                        



                                                    Malignant neoplasm 



                                                    of endometrium 

 

             BLOOD UREA NITROGEN Routine      10/26/2020 10:16 Malignant neoplas

m Results for this



                                       AM CDT       of cervix uteri, not procedu

re are in



                                                                otherwise specif

ied



                                        the results



                                                    Complex endometrial section.



                                                                hyperplasia



                                        



                                                    Malignant neoplasm 



                                                    of endometrium 

 

             GLUCOSE LEVEL Routine      10/26/2020 10:16 Malignant neoplasm Resu

lts for this



                                       AM CDT       of cervix uteri, not procedu

re are in



                                                                otherwise specif

ied



                                        the results



                                                    Complex endometrial section.



                                                                hyperplasia



                                        



                                                    Malignant neoplasm 



                                                    of endometrium 

 

             HC HIV 1/2 AG AND AB 4TH Routine      10/26/2020 10:16 Malignant ne

oplasm Results for

this



             GEN                       AM CDT       of cervix uteri, not procedu

re are in



                                                                otherwise specif

ied



                                        the results



                                                    Complex endometrial section.



                                                                hyperplasia



                                        



                                                    Malignant neoplasm 



                                                    of endometrium 

 

             HEMOGLOBIN A1C Routine      10/26/2020 10:16 Malignant neoplasm Res

ults for this



                                       AM CDT       of cervix uteri, not procedu

re are in



                                                                otherwise specif

ied



                                        the results



                                                    Complex endometrial section.



                                                                hyperplasia



                                        



                                                    Malignant neoplasm 



                                                    of endometrium 

 

             COMPLETE BLOOD COUNT W/ Routine      10/26/2020 10:16 Malignant alex

plasm 



             DIFFERENTIAL              AM CDT       of cervix uteri, not 



                                                                otherwise specif

ied



                                        



                                                    Complex endometrial 



                                                                hyperplasia



                                        



                                                    Malignant neoplasm 



                                                    of endometrium 

 

             COMPREHENSIVE METABOLIC Routine      10/26/2020 10:16 Malignant alex

plasm 



             PANEL                     AM CDT       of cervix uteri, not 



                                                                otherwise specif

ied



                                        



                                                    Complex endometrial 



                                                                hyperplasia



                                        



                                                    Malignant neoplasm 



                                                    of endometrium 

 

             HC 2019-NCOV COVID-19 Routine      10/22/2020  1:14 Suspected COVID

-19 Results for 

this



                                       PM CDT                    procedure are i

n



                                                                 the results



                                                                 section.

 

             PATHOLOGY OUTSIDE Routine      10/13/2020                Results fo

r this



             INTERPRETATION                                        procedure are

 in



                                                                 the results



                                                                 section.

 

             [facility] US PELVIC Routine      2020 11:48 Cancer       Results fo

r this



                                       AM CDT                    procedure are i

n



                                                                 the results



                                                                 section.



after 2020



Results

[facility] Mammo (2021  4:33 PM CDT)Only the most recent of2 resultswithin the 
time period is included.





                                        Specimen

 

                                        









                          Narrative                 Performed At

 

                          Study acquired at another institution. For comparison 

only. No MD MILADYS Blackwell 



                                        



                          originated interpretation requested or available. 









                Performing Organization Address         City/State/ZIP Code Phon

e Number

 

                [facility]                                         



Cytology HPV 16/18 Genotyping and High Risk Pool (2021  3:43 PM CDT)





             Component    Value        Ref Range    Performed At Pathologist



                                                                 Signature

 

             HPV Type 16  Negative     Negative,    [facility]  



                                       Indeterminate              



                                       , Invalid                 

 

             HPV Type 18  Positive (A) Negative,    [facility]  



                                       Indeterminate              



                                       , Invalid                 

 

             HPV High Risk Negative     Negative,    [facility]  



             Non-16/18                 Indeterminate              



                                       , Invalid                 

 

                          Informational Points      The parminder HPV Test ([facility] 

[facility], [place], [place]) 

is a qualitative in vitro diagnostic test for the detection of Human 
Papillomavirus in cervical specimens collected in PreservCyt Solution or      

                     [facility]                                 



                                                     SurePathTM Preservation Flu

id. The test utilizes amplification of target DNA 

by the Polymerase Chain Reaction (PCR) and nucleic acid hybridization for the 
detection of 14 high-risk (HR) HPV types in a                                   

      



                                                    single analysis. The test sp

ecifically identifies types HPV16 and HPV18 while 

concurrently detecting the other high risk types (31, 33, 35, 39, 45, 51, 52, 
56, 58, 59, 66, and 68).                                         



                                                                 



                                                    The performance characterist

ics of this test were validated and determined by 

the [facility] cytology laboratory. These validation analyses have confirmed the 
accurate performance of the assay of the sujata braggurer



s stated limit of detection for the target of the test in various specimen 
types.                                                      



                                                                 



                                                    The [facility] cytology laborator

y is authorized under Clinical Laboratory 

Improvement Amendments (CLIA) to perform high-complexity testing. The [facility] 
Department of Pathology is accredited by the College of [employer] 
(CAP).                                                      









                                        Specimen

 

                                        Swab - Cervix, SurePath, Liquid Based Pr

eparation









                Performing Organization Address         City/[facility]/ZIP Code Phon

e Number

 

                          [facility] [address]         



                                [address]                 



Cytology GYN Interpretation (2021  3:43 PM CDT)





             Component    Value        Ref Range    Performed At Pathologist



                                                                 Signature

 

             Gross Description A:                        [facility]  



                          1 SurePath vial received                           

 

             Specimen Information A. Cervix, SurePath,              [facility] 

 



                          Liquid Based                           



                          Preparation, Swab,                           

 

             Specimen Adequacy Satisfactory for              [facility]  



                          evaluation                             

 

             TZ/Endocervical Endocervical/transform              [facility]  



                          ation zone component                           



                          present                                

 

             Diagnosis    Negative for              [facility]  Electronically



                          intraepithelial lesion                           claire

d by Mariel



                          or malignancy                           MD Amari on



                                                                 2021 at  9

:07



                                                                 AM

 

             Diagnosis Additional Partial atrophy              [facility]  



             Statements   Therapy effect                           

 

             HPV Reflex for Gyn Yes                       [facility]  

 

                          Informational Points      Cervicovaginal cytology is a

 screening procedure subject to

false negatives and false positives. Results are more reliable when a 
satisfactory sample is obtained on a regular repetitive basis and should        

                   [facility]                                    



                           be interpreted together with past and current clinica

l data.                           



                                                    Some tests reported here may

 have been developed and performance 

characteristics determined by [facility]. 
These tests have not been specifically cleared or approved by the U.S. Food and 
Drug Administration.                                         









                                        Specimen

 

                                        Swab - Cervix, SurePath, Liquid Based Pr

eparation









                Performing Organization Address         City/[facility]/ZIP Code Phon

e Number

 

                          [facility] [address]         



                                [address]                 



PETCT Subsequent Treatment Strategy (2021  9:41 AM CDT)





                                        Specimen

 

                                        









                          Impressions               Performed At

 

                          No activity in the uterine cervix or FDG avid laura me

tastases from 

[id number]



                                        treated disease.



                                        



                                         



                                        



                          Fibroid uterus. New focal activity in the uterus from 

a degenerating 



                                        fibroid.



                                        



                                         



                                        



                          Stable subcentimeter right breast nodule since CT ches

t 10/28/2020 



                          from possible intramammary node, however can be furthe

r evaluated with 



                                        mammography if not already performed.



                                        



                                         



                                        



                                         



                                        



                                                    









                          Narrative                 Performed At

 

                                        FULL RESULT:



                                        [id number]



                                         



                                        



                                        Examination: FDG PET/CT, 2021 9:41 

AM



                                        



                                         



                                        



                          Clinical History: 50-year-old female with malignant ne

oplasm of the 



                                        endometrium and uterine cervix



                                        



                                         



                                        



                                        Indication: Restaging, subsequent treatm

ent strategy



                                        



                                         



                                        



                                        Comparison: PET/CT 2021



                                        



                                         



                                        



                          Technique: F-18 fluorodeoxyglucose (FDG) 10 mCi was ad

ministered 



                          intravenously via right antecubital vein. To allow f

or distribution 



                          and uptake of radiotracer, the patient was asked to re

st quietly for 



                          approximately 60-90 minutes. PET/CT imaging was perf

ormed from the 



                          skull to the proximal thighs. Serum blood glucose at t

he time of the 



                          injection was 106 mg/dL. CT scanning was done for atte

nuation 



                          correction, image registration, and diagnosis with sca

n parameters 



                          optimized to minimize radiation exposure to the patien

t. SUV 



                          measurements are reported as maximum SUV based on body

 weight unless 



                                        otherwise specified.



                                        



                                         



                                        



                                        Findings: 



                                        



                          Head and Neck: Physiologic radiotracer activity is not

ed in the brain. 



                          The paranasal sinuses are clear. No enlarged or hyperm

etabolic 



                                        cervical lymph nodes are identified.



                                        



                                         



                                        



                          Heterogeneous thyroid gland from underlying nodules of

 which the 



                          dominant nodule was biopsied in the inferior right lob

e on 2020 



                                        revealing colloid nodule with cystic deg

eneration.



                                        



                                         



                                        



                          Chest: Stable indeterminate non-FDG avid subcentimeter

 lung nodules in 



                          the right upper lobe series 4 image 57, left upper lob

e image 74 and 



                          right middle lobe image 86 measuring up to 0.5 cm comp

ared to CT chest 



                                        10/28/2020.



                                        



                                         



                                        



                          There are no enlarged or hypermetabolic mediastinal, h

ilar or axillary 



                                        lymph nodes.



                                        



                                         



                                        



                          A stable non-FDG avid 0.6 cm right breast nodule on se

raji 3 image 108 



                                        is unchanged from CT chest 10/28/2020. 



                                        



                                         



                                        



                                        Abdomen and Pelvis: 



                                        



                                         



                                        



                          The unenhanced spleen, gallbladder, liver, pancreas, a

drenal glands 



                                        and kidneys are normal. No hydronephrosi

s.



                                        



                                         



                                        



                          Similar tiny left common iliac lymph node on series 3 

image 181 



                          without associated activity from treated disease. Ther

e are no new 



                                        enlarged or hypermetabolic abdominal or 

pelvic lymph nodes. 



                                        



                                         



                                        



                          No focal hypermetabolism in the uterine cervix. Fibroi

d uterus. New 



                          focal activity on image 212 with an SUV of 5.6 localiz

ing to an 



                          anterior mid body intramural fibroid, better seen on t

he prior 



                                        contrast-enhanced examination.



                                        



                                         



                                        



                          Musculoskeletal: No suspicious osseous lesions. Again 

seen is activity 



                                        close to the femoral trochanters from in

flammation.



                                        



                                                    









                                        Procedure Note

 

                                        Interface, Radiology Results In - 2021 11:52 AM CDT



Formatting of this note might be different from the original.



                                        FULL RESULT:



                                        



                                        Examination: FDG PET/CT, 2021 9:41 

AM



                                        



                                        Clinical History: 50-year-old female wit

h malignant neoplasm of the endometrium 

and uterine cervix



                                        



                                        Indication: Restaging, subsequent treatm

ent strategy



                                        



                                        Comparison: PET/CT 2021



                                        



                                        Technique: F-18 fluorodeoxyglucose (FDG)

 10 mCi was administered intravenously 

via right antecubital vein.  To allow for distribution and uptake of 
radiotracer, the patient was asked to rest quietly for



                                        approximately 60-90 minutes.  PET/CT hussein

ging was performed from the skull to the

proximal thighs. Serum blood glucose at the time of the injection was 106 mg/dL.
CT scanning was done for attenuation correction, image



                                        registration, and diagnosis with scan pa

rameters optimized to minimize radiation

exposure to the patient. SUV measurements are reported as maximum SUV based on 
body weight unless otherwise specified.



                                        



                                        Findings:



                                        Head and Neck: Physiologic radiotracer a

ctivity is noted in the brain. The 

paranasal sinuses are clear. No enlarged or hypermetabolic cervical lymph nodes 
are identified.



                                        



                                        Heterogeneous thyroid gland from underly

ing nodules of which the dominant nodule

was biopsied in the inferior right lobe on 2020 revealing colloid nodule 
with cystic degeneration.



                                        



                                        Chest: Stable indeterminate non-FDG avid

 subcentimeter lung nodules in the right

upper lobe series 4 image 57, left upper lobe image 74 and right middle lobe 
image 86 measuring up to 0.5 cm compared to CT chest 10/28/2020.



                                        



                                        There are no enlarged or hypermetabolic 

mediastinal, hilar or axillary lymph 

nodes.



                                        



                                        A stable non-FDG avid 0.6 cm right breas

t nodule on series 3 image 108 is 

unchanged from CT chest 10/28/2020.



                                        



                                        Abdomen and Pelvis:



                                        



                                        The unenhanced spleen, gallbladder, live

r, pancreas, adrenal glands and kidneys 

are normal. No hydronephrosis.



                                        



                                        Similar tiny left common iliac lymph nod

e on series 3 image 181 without 

associated activity from treated disease. There are no new enlarged or 
hypermetabolic abdominal or pelvic lymph nodes.



                                        



                                        No focal hypermetabolism in the uterine 

cervix. Fibroid uterus. New focal 

activity on image 212 with an SUV of 5.6 localizing to an anterior mid body 
intramural fibroid, better seen on the prior contrast-enhanced examination.



                                        



                                        Musculoskeletal: No suspicious osseous l

esions. Again seen is activity close to 

the femoral trochanters from inflammation.



                                        



                                        IMPRESSION:



                                        No activity in the uterine cervix or FDG

 avid laura metastases from treated 

disease.



                                        



                                        Fibroid uterus. New focal activity in th

e uterus from a degenerating fibroid.



                                        



                                        Stable subcentimeter right breast nodule

 since CT chest 10/28/2020 from possible

intramammary node, however can be further evaluated with mammography if not 
already performed.









                Performing Organization Address         City/State/ZIP Code Phon

e Number

 

                [id number]                                  



POC Glucose Screen (2021  8:07 AM CDT)Only the most recent of3 results
within the time period is included.





             Component    Value        Ref Range    Performed At Pathologist



                                                                 Signature

 

             POC Glucose  106 (H)      70 - 99 mg/dL [facility]   



                          Comment:                               



                                                    Capillary blood samples, e.g

. obtained by fingerstick, may have inaccurate 

results in patients with decreased peripheral blood flow.                       

                  



                                                                 



                                                    Method description: All resu

lts are measured using Electrochemistry test 

methodology. The glucose in the sample mixes with the reagents on the test 
strip. The reaction produces an electric current. The                           

              



                          amount of current produced is proportional to the     

                      



                          glucose concentration in the blood.                   

        



                                                                 

 

             PO Sample Type Venous                    [facility]   

 

             [facility]              [facility]   



                          [facility] [place] [id number],                           



                          Point of Care Lab                           



                          Director: Tarcey Malhotra MD                           









                                        Specimen

 

                                        Blood









                Performing Organization Address         City/State/ZIP Code Phon

e Number

 

                [facility]                                      



PETCT Contrast Enhanced Subsequent Treatment Strategy (2021  9:06 AM CDT)





                                        Specimen

 

                                        









                          Impressions               Performed At

 

                          1. Interval decrease in F-18 FDG activity associated w

ith the primary 

[id number]



                          malignancy within the cervix and left paracervical reg

ion, likely 



                                        representing treated disease



                                        



                                         



                                        



                          2. Interval decrease in size and resolution of hyperme

tabolism 



                                        associated with a left common iliac lymp

h node. 



                                        



                                         



                                        



                                        3. No new or distant sites of disease.



                                        



                                         



                                        



                                         



                                        



                          I personally reviewed these image(s) along with the 



                          resident's/fellow's interpretations, certify that if a

 procedure was 



                          performed I was physically present, and agree with the

 final report. 









                          Narrative                 Performed At

 

                                        FULL RESULT:



                                        [id number]



                                         



                                        



                                        Examination: Contrast-Enhanced F-18 FD

G PET/CT, 2021 9:06 AM



                                        



                                         



                                        



                                        Clinical History: Squamous cell carcinom

a of the cervix



                                        



                                         



                                        



                                        Indication: Restaging for subsequent shanel

atment plan



                                        



                                         



                                        



                                        Comparison: PET/CT 2020, MRI pelvi

s 3/4/2021



                                        



                                         



                                        



                          Technique: F-18 fluorodeoxyglucose (FDG) 8.1 mCi was

 administered 



                          intravenously via a left antecubital fossa vein. To 

allow for 



                          distribution and uptake of radiotracer, the patient wa

s asked to rest 



                          quietly for approximately 60-90 minutes. PET/CT imag

ing was 



                          performed from the skull base to mid thighs. Serum blo

od glucose at 



                          the time of the injection was 101 mg/dL. CT scanning w

as done for 



                          attenuation correction, image registration, and diagno

sis with scan 



                          parameters optimized to minimize radiation exposure to

 the patient. 



                          The CT portion of the examination was performed with i

ntravenous and 



                          enteric contrast. SUV measurements are reported as max

imum SUV based 



                                        on body weight unless otherwise specifie

d. 



                                        



                                         



                                        



                                        Findings: 



                                        



                          Head and Neck: No F-18 FDG avid lesions within the bra

in parenchyma. 



                          The paranasal sinuses are well aerated. No cervical or

 supraclavicular 



                          lymphadenopathy. Stable mildly enlarged right thyroid 

gland with 



                                        multiple non-F-18 FDG nodules, most like

ly multinodular goiter.



                                        



                                         



                                        



                          Chest: No F-18 FDG avid mediastinal, hilar or axillary

 



                                        lymphadenopathy.



                                        



                                         



                                        



                          No F-18 FDG avid pulmonary nodularity. Stable non-F-18

 FDG avid 5 mm 



                          pulmonary nodule in the right upper lobe (image 197). 

No pleural or 



                                        pericardial effusion.



                                        



                                         



                                        



                                        Stable non-F-18 FDG avid 5 mm right cristiana

st nodule (image 169).



                                        



                                         



                                        



                          Abdomen and Pelvis: No F-18 FDG avid lesions in the li

kyra, 



                          gallbladder, spleen, pancreas, adrenal glands, or kidn

eys. Physiologic 



                          gastrointestinal activity. No F-18 FDG avid abdominal,

 retroperitoneal 



                          lymphadenopathy. There is been an interval decrease in

 size and F-18 



                          FDG avidity of a left common iliac lymph node, which m

easures now 



                          approximately 0.7 x 0.4 cm with no significant radiotr

acer activity 



                          above background (image 302). Previously, this measure

d 1.4 x 1.1 cm 



                                        with a maximum SUV of 4.5.



                                        



                                         



                                        



                          There is interval decrease in F-18 FDG activity associ

ated with a 



                          previously seen hypermetabolic mass in the cervix and 

left 



                                        paracervical region (SUV max 4.8, previo

usly 13.6). 



                                        



                                         



                                        



                          Enlarged uterus with multiple fibroids, unchanged. Non

 mass-like 



                          activity at the vaginal introitus likely represents ex

creted urinary 



                                        activity.



                                        



                                         



                                        



                                        Musculoskeletal: No F-18 FDG FDG avid os

seous lesions.



                                        



                                         



                                        



                                                    









                                        Procedure Note

 

                                        Interface, Radiology Results In - 2021 11:30 AM CDT



Formatting of this note might be different from the original.



                                        FULL RESULT:



                                        



                                        Examination:  Contrast-Enhanced F-18 FDG

 PET/CT, 2021 9:06 AM



                                        



                                        Clinical History: Squamous cell carcinom

a of the cervix



                                        



                                        Indication: Restaging for subsequent shanel

atment plan



                                        



                                        Comparison: PET/CT 2020, MRI pelvi

s 3/4/2021



                                        



                                        Technique:  F-18 fluorodeoxyglucose (FDG

) 8.1 mCi was administered intravenously

via a left antecubital fossa vein.  To allow for distribution and uptake of 
radiotracer, the patient was asked to rest



                                        quietly for approximately 60-90 minutes.

  PET/CT imaging was performed from the 

skull base to mid thighs. Serum blood glucose at the time of the injection was 
101 mg/dL. CT scanning was done for attenuation correction,



                                        image registration, and diagnosis with s

can parameters optimized to minimize 

radiation exposure to the patient. The CT portion of the examination was 
performed with intravenous and enteric contrast. SUV



                                        measurements are reported as maximum SUV

 based on body weight unless otherwise 

specified.



                                        



                                        Findings:



                                        Head and Neck: No F-18 FDG avid lesions 

within the brain parenchyma. The 

paranasal sinuses are well aerated. No cervical or supraclavicular 
lymphadenopathy. Stable mildly enlarged right thyroid gland with multiple



                                        non-F-18 FDG nodules, most likely multin

odular goiter.



                                        



                                        Chest: No F-18 FDG avid mediastinal, hil

ar or axillary lymphadenopathy.



                                        



                                        No F-18 FDG avid pulmonary nodularity. S

table non-F-18 FDG avid 5 mm pulmonary 

nodule in the right upper lobe (image 197). No pleural or pericardial effusion.



                                        



                                        Stable non-F-18 FDG avid 5 mm right cristiana

st nodule (image 169).



                                        



                                        Abdomen and Pelvis: No F-18 FDG avid les

ions in the liver, gallbladder, spleen, 

pancreas, adrenal glands, or kidneys. Physiologic gastrointestinal activity. No 
F-18 FDG avid abdominal, retroperitoneal lymphadenopathy.



                                        There is been an interval decrease in si

ze and F-18 FDG avidity of a left common

iliac lymph node, which measures now approximately 0.7 x 0.4 cm with no 
significant radiotracer activity above background (image 302).



                                        Previously, this measured 1.4 x 1.1 cm w

ith a maximum SUV of 4.5.



                                        



                                        There is interval decrease in F-18 FDG a

ctivity associated with a previously 

seen hypermetabolic mass in the cervix and left paracervical region (SUV max 
4.8, previously 13.6).



                                        



                                        Enlarged uterus with multiple fibroids, 

unchanged. Non mass-like activity at the

vaginal introitus likely represents excreted urinary activity.



                                        



                                        Musculoskeletal: No F-18 FDG FDG avid os

seous lesions.



                                        



                                        



                                        IMPRESSION:



                                        1. Interval decrease in F-18 FDG activit

y associated with the primary malignancy

within the cervix and left paracervical region, likely representing treated 
disease



                                        



                                        2. Interval decrease in size and resolut

ion of hypermetabolism associated with a

left common iliac lymph node.



                                        



                                        3. No new or distant sites of disease.



                                        



                                        



                                        I personally reviewed these image(s) olivia lucas with the resident's/fellow's 

interpretations, certify that if a procedure was performed I was physically 
present, and agree with the final report.









                Performing Organization Address         City/State/ZIP Code Phon

e Number

 

                [id number]                                  



POC Creatinine (2021  7:28 AM CDT)





             Component    Value        Ref Range    Performed At Pathologist



                                                                 Signature

 

             POC Crea     0.8          0.6 - 1.3    [facility]   



                          Comment:     mg/dL                     



                                                    Medications, especially hydr

oxyurea or supplements, such as ascorbate, can 

interfere with test results causing a falsely and significantly higher result 
than expected. If a problem is suspected with a patient's result, a sample 
should be sent to the                                         



                          laboratory for confirmatory testing.                  

         



                                                                 



                                                    Method description: The [employer]

AT is an analyzer used for in vitro quantification 

of various analytes in whole blood. The device uses a single disposable 
cartridge which contains microfabricated sensors, a                             

            



                           calibration solution, fluidics system, and a waste   

                        



                                                    chamber. Each test cartridge

 contains chemically sensitive biosensors on a 

silicon chip that are configured to perform specific tests. The microfabricated 
sensors measure analyte concentration by an electrochemical assay.              

                           



                                                                 

 

             POC eGFR-AA  101          >=60         [facility]   



                          Comment:     mL/min/1.73 m2              



                          Normal eGFR >= 60 mL/min/1.73 m2                      

     



                                                                 



                                                    The eGFR is calculated using

 the CKD-EPI equation. The eGFR declines with age. 

eGFR <60 mL/min/1.73 m2 is considered as "decreased" This equation should only 
be used for patients 18 and older.                                         



                                                                 



                                                    According to the National Ki

dney Foundation's Kidney Disease Outcome Quality 

Initiative (KDOQI) classification and 2012 Kidney Disease Improving Global 
Outcomes (KDIGO) Clinical Practice Guideline, the stage of CKD should be 
categorized based on                                         



                          estimated GFR.                           



                                                                 



                          Stage Description GFR mL/min/1.73 m2                  

         



                          1 Kidney damage with normal or high GFR   >=90    

                       



                          2 Kidney damage with mild decrease in GFR  60-89  

                         



                          3a Mild to moderate decrease in GFR   45-59       

                    



                          3b Moderate to severe decrease in GFR   30-44     

                      



                          4 Severe decrease in GFR  15-29                   

        



                          5 Kidney failure   <15 (or dialysis)              

             



                                                                 

 

             POC eGFR-ZORAIDA 87           >=60         [facility]   



                          Comment:     mL/min/1.73 m2              



                          Normal eGFR >= 60 mL/min/1.73 m2                      

     



                                                                 



                                                    The eGFR is calculated using

 the CKD-EPI equation. The eGFR declines with age. 

eGFR <60 mL/min/1.73 m2 is considered as "decreased" This equation should only 
be used for patients 18 and older.                                         



                                                                 



                                                    According to the National Ki

dney Foundation's Kidney Disease Outcome Quality 

Initiative (KDOQI) classification and 2012 Kidney Disease Improving Global 
Outcomes (KDIGO) Clinical Practice Guideline, the stage of CKD should be 
categorized based on                                         



                          estimated GFR.                           



                                                                 



                          Stage Description GFR mL/min/1.73 m2                  

         



                          1 Kidney damage with normal or high GFR   >=90    

                       



                          2 Kidney damage with mild decrease in GFR  60-89  

                         



                          3a Mild to moderate decrease in GFR   45-59       

                    



                          3b Moderate to severe decrease in GFR   30-44     

                      



                          4 Severe decrease in GFR  15-29                   

        



                          5 Kidney failure   <15 (or dialysis)              

             



                                                                 

 

             POC Clean Dev Yes                       [facility]   

 

             Performing Lab DI WallaceComment:              [facility]   



                          [facility] MD                           



                          [facility]                           



                          [facility],                           



                          [address] [place] [address]; [place]                           



                          of Care :                           



                          Tracey Malhotra MD                            









                                        Specimen

 

                                        Blood









                Performing Organization Address         City/State/ZIP Code Phon

e Number

 

                [facility]                                      



MRI Pelvis with and without Contrast - Musculoskeletal (2021  6:17 PM CDT)





                                        Specimen

 

                                        









                          Impressions               Performed At

 

                                        No evidence of metastatic involvement of

 the skeletal pelvis.



                                        [id number]



                          Significant bilateral gluteus medius enthesopathy and 

low-grade tear 



                                        that is most significant on the left grace

e.



                                        



                                                    









                          Narrative                 Performed At

 

                                        FULL RESULT:



                                        [id number]



                                         



                                        



                          Examination: MRI PELVIS W WO CONTRAST - MUSCULOSKELETA

L, 2021 6:17 



                                        PM.



                                        



                                         



                                        



                          Clinical History: A 50-year-old patient with cervix ca

rcinoma on 



                                        treatment with CISplatin with Radiation 

since 2020



                                        



                                         



                                        



                                        Indication: Hip pain, Hip pain



                                        



                                         



                                        



                                        Comparison: MRI or 2021



                                        



                                         



                                        



                          Technique: Multiplanar multisequence magnetic resonanc

e imaging of the 



                          pelvis was performed without and with intravenous admi

nistration of 



                                        contrast.



                                        



                                         



                                        



                                        Findings: 



                                        



                                        Bones:



                                        



                                        * No focal bone lesion or fracture det

ected.



                                        



                                        Nodes:



                                        



                                        * No pelvic or inguinal lymphadenopath

y demonstrated



                                        



                                        Visceral pelvis:



                                        



                          * Multiple uterine fibroids. No discernible enhancin

g or diffusion 



                          restricting cervical mass. Small amount of fluid withi

n the pelvic 



                                        cul-de-sac.



                                        



                                        Skeletal pelvis:



                                        



                          * Bilateral increased T2 signal adjacent to the grea

ter trochanters 



                          indicative of gluteus medius enthesopathy and low-grad

e tear most 



                          significant on the right and left side (series 4 image

 23 and series 8 



                                        image 30).



                                        



                          * Small subcortical synovial cyst pit within the ant

erior right 



                                        femoral neck (series 8 image 28). No adal

dence of AVN 



                                        



                                                    









                                        Procedure Note

 

                                        Interface, Radiology Results In - 2021  8:05 AM CDT



Formatting of this note might be different from the original.



                                        FULL RESULT:



                                        



                                        Examination: MRI PELVIS W WO CONTRAST - 

MUSCULOSKELETAL, 2021 6:17 PM.



                                        



                                        Clinical History: A 50-year-old patient 

with cervix carcinoma on treatment with 

CISplatin with Radiation since 2020



                                        



                                        Indication: Hip pain, Hip pain



                                        



                                        Comparison: MRI or 2021



                                        



                                        Technique: Multiplanar multisequence mag

netic resonance imaging of the pelvis 

was performed without and with intravenous administration of contrast.



                                        



                                        Findings:



                                        Bones:



                                        *  No focal bone lesion or fracture dete

cted.



                                        Nodes:



                                        *  No pelvic or inguinal lymphadenopathy

 demonstrated



                                        Visceral pelvis:



                                        *  Multiple uterine fibroids. No discern

ible enhancing or diffusion restricting 

cervical mass. Small amount of fluid within the pelvic cul-de-sac.



                                        Skeletal pelvis:



                                        *  Bilateral increased T2 signal adjacen

t to the greater trochanters indicative 

of gluteus medius enthesopathy and low-grade tear most significant on the right 
and left side (series 4 image 23 and series 8 image 30).



                                        *  Small subcortical synovial cyst pit w

ithin the anterior right femoral neck 

(series 8 image 28). No evidence of AVN



                                        



                                        IMPRESSION:



                                        No evidence of metastatic involvement of

 the skeletal pelvis.



                                        Significant bilateral gluteus medius ent

hesopathy and low-grade tear that is 

most significant on the left side.









                Performing Organization Address         City/[facility]/ZIP Code Phon

e Number

 

                [id number]                                  



XR HIP 2 VW BILATERAL W PELVIS (2021  2:32 PM CDT)





                                        Specimen

 

                                        









                          Impressions               Performed At

 

                                        Possible minimal degenerative changes at

 the left hip. 



                                        [id number]



                                         



                                        



                                                    









                          Narrative                 Performed At

 

                                        FULL RESULT:



                                        [id number]



                                         



                                        



                                        Examination: XR HIP 2 VW BILATERAL W PEL

VIS, 2021 at 2:12 PM



                                        



                                         



                                        



                                        Clinical History: Pain in unspecified hi

p



                                        



                                         



                                        



                                        Indication: Pain in unspecified hip



                                        



                                         



                                        



                          Comparison: Images through the pelvis and hips from PE

T/CT of 2020 



                                        



                                         



                                        



                          Technique: AP view of the pelvis and coned AP and frog

-leg lateral 



                                        views of both hips



                                        



                                         



                                        



                          Findings: A tiny calcification at the lateral margin o

f the left 



                          acetabulum is of uncertain significance. It could repr

esent a small 



                          dystrophic calcification in soft tissues. Small spurs 

also present and 



                          could indicate otherwise inapparent degenerative treadwell

e. No other bony 



                          abnormality is noted. Hip joints and sacroiliac joints

 are maintained 



                                        in width.



                                        



                                                    









                                        Procedure Note

 

                                        Interface, Radiology Results In - 2021  4:00 PM CDT



Formatting of this note might be different from the original.



                                        FULL RESULT:



                                        



                                        Examination: XR HIP 2 VW BILATERAL W PEL

VIS, 2021 at 2:12 PM



                                        



                                        Clinical History: Pain in unspecified hi

p



                                        



                                        Indication: Pain in unspecified hip



                                        



                                        Comparison: Images through the pelvis an

d hips from PET/CT of 2020



                                        



                                        Technique: AP view of the pelvis and con

ed AP and frog-leg lateral views of both

hips



                                        



                                        Findings: A tiny calcification at the la

teral margin of the left acetabulum is 

of uncertain significance. It could represent a small dystrophic calcification 
in soft tissues. Small spurs also present and



                                        could indicate otherwise inapparent dege

nerative change. No other bony 

abnormality is noted. Hip joints and sacroiliac joints are maintained in width.



                                        



                                        IMPRESSION:



                                        Possible minimal degenerative changes at

 the left hip.









                Performing Organization Address         City/[facility] Code Phon

e Number

 

                [id number]                                  



MRI Pelvis with and without Contrast - GYN (2021  7:18 AM CST)Only the 
most recent of3 resultswithin the time period is included.





                                        Specimen

 

                                        









                          Impressions               Performed At

 

                          1. The site of known residual disease in the cervica

l region and 

[id number]



                          left parametrial nodule are difficult to clearly visua

lize but the 



                          appearances remain unchanged since 2020 and the 

significant 



                                        interval improvement compared to MRI fro

m 10/30/2020 is maintained.



                                        



                          2. Residual subcentimeter left common iliac node rem

ains stable 



                          since prior study, decreased compared to 10/30/2020. 









                          Narrative                 Performed At

 

                                        FULL RESULT:



                                        [id number]



                                         



                                        



                                        Examination: MRI PELVIS W WO CONTRAST - 

GYN on 3/4/2021 7:18 AM



                                        



                                         



                                        



                          Clinical History: Malignant neoplasm of overlapping si

angela of cervix 



                                        uteri



                                        



                                         



                                        



                          Indication: Determine extent of tumor/ disease, vascul

ar invasion, 



                          tumor encapsulation, satellite nodules, multi-focal di

sease, therapy 



                                        response



                                        



                                         



                                        



                                        Comparison: None.



                                        



                                         



                                        



                                        Technique: MRI pelvis with and without i

ntravenous contrast



                                        



                                         



                                        



                                        Findings: 



                                        



                          The site of known residual disease has further decreas

ed in size and 



                          is difficult to clearly visualize on the current exami

nation. There is 



                          only some nodular thickening noted in this region [for

 example series 



                          5, image 13]. Similarly the 7 mm residual nodularity n

oted in the site 



                          of known left paratracheal nodule appears stable since

 prior study and 



                          decreased compared to initial MRI from 10/30/2020. As 

seen before, 



                          questionable minimal restricted diffusion is seen in t

his region 



                                        [series 1050, image 84].



                                        



                                         



                                        



                          Thickened junctional zone is again noted in the uterus

, likely 



                          secondary to adenomyomatosis. Multiple fibroids are ag

ain noted, with 



                          the largest measuring 3.3 cm in the anterior fundus. S

mall bilateral 



                                        ovarian cysts are again seen.



                                        



                                         



                                        



                          Stable small volume pelvic nodes. For example a 9 mm l

eft common iliac 



                          node [series 9, image 9] and a 5 x 10 mm right common 

iliac node and a 



                          3 mm left external iliac node [image 23] remain stable

 since prior 



                                        study.



                                        



                                                    









                                        Procedure Note

 

                                        Interface, Radiology Results In - 2021 10:38 AM CST



Formatting of this note might be different from the original.



                                        FULL RESULT:



                                        



                                        Examination: MRI PELVIS W WO CONTRAST - 

GYN on 3/4/2021 7:18 AM



                                        



                                        Clinical History: Malignant neoplasm of 

overlapping sites of cervix uteri



                                        



                                        Indication: Determine extent of tumor/ d

isease, vascular invasion, tumor 

encapsulation, satellite nodules, multi-focal disease, therapy response



                                        



                                        Comparison: None.



                                        



                                        Technique: MRI pelvis with and without i

ntravenous contrast



                                        



                                        Findings:



                                        The site of known residual disease has f

urther decreased in size and is 

difficult to clearly visualize on the current examination. There is only some 
nodular thickening noted in this region [for example series 5, image



                                        13]. Similarly the 7 mm residual nodular

ity noted in the site of known left 

paratracheal nodule appears stable since prior study and decreased compared to 
initial MRI from 10/30/2020. As seen before, questionable



                                        minimal restricted diffusion is seen in 

this region [series 1050, image 84].



                                        



                                        Thickened junctional zone is again noted

 in the uterus, likely secondary to 

adenomyomatosis. Multiple fibroids are again noted, with the largest measuring 
3.3 cm in the anterior fundus. Small bilateral ovarian cysts are again seen.



                                        



                                        Stable small volume pelvic nodes. For ex

ample a 9 mm left common iliac node 

[series 9, image 9] and a 5 x 10 mm right common iliac node and a 3 mm left 
external iliac node [image 23] remain stable since prior study.



                                        



                                        IMPRESSION:



                                        1.  The site of known residual disease i

n the cervical region and left 

parametrial nodule are difficult to clearly visualize but the appearances remain
unchanged since 2020 and the significant interval improvement



                                        compared to MRI from 10/30/2020 is maint

ained.



                                        2.  Residual subcentimeter left common i

liac node remains stable since prior 

study, decreased compared to 10/30/2020.









                Performing Organization Address         City/State/ZIP Code Phon

e Number

 

                [id number]                                  



COVID-19 (SARS-CoV-2) PCR-Asymptomatic  (01/10/2021 10:45 AM CST)Only the most
recent of3 resultswithin the time period is included.





             Component    Value        Ref Range    Performed At Pathologist



                                                                 Signature

 

             COVID19 (SARS Not Detected Not Detected [facility] 



             CoV-2) Result Comment:                  [facility] 



                                                    This test is a qualitative r

everse-transcriptase polymerase chain reaction (RT-

PCR) developed for the Roche PARMINDER textmetix0 system and intended for the detection of
SARS CoV-2 RNA in human nasopharyngeal specimens from patients who meet COVID-19
clinical                                                    



                                                    and/or epidemiological crite

parmjit. This assay has been approved by the FDA for 

use only under Emergency Use Authorization (EUA) in laboratories that have been 
CLIA-certified to perform moderate-complexity and high-complexity tests. The 
performance                                                 



                                                    characteristics of this assa

y were verified by the Microbiology Laboratory at 

[facility], CLIA Accreditation #: [id number] and CAP 
Accreditation #: [id number]. Results must be interpreted within the context of all 
relevant clinical and                                         



                                                    laboratory findings and shou

ld not form the sole basis for a diagnosis or 

treatment decision.                                         



                                                                 



                                                    "Presumptive Positive" resul

ts are due to partial amplification of SARS-CoV-2 

targets and indicates low amounts of virus present in the specimen at or near 
the limit of detection. Regardless, individuals with "Presumptive Positive" 
results should be                                           



                                                    managed per institutional gu

idelines as individuals positive for SARS-CoV-2 

virus, including use of appropriate infection control protocols.                

                         



                                                                 



                                                    Internal controls are includ

ed to assess for possible amplification inhibitors.

If inhibition is detected, testing is repeated and if inhibition is confirmed 
the specimen is resulted as "Invalid". When                                     

    



                          an "Invalid" result occur, it is recommended to wait  

                         



                                 3 days before submitting a new specimen for angela

ting if clinically indicated.                 

                                        

 

             COVID19 SARS NP Swab                   [facility] 



             Source                                 [facility] 

 

             COVID19 SARS Pre-Radiation Therapy              [facility] 



             Indication                             [facility] 









                                        Specimen

 

                                        Nasopharyngeal Swab









                Performing Organization Address         City/State/ZIP Code Phon

e Number

 

                [facility] [place] Unless otherwise noted, [place] [address] 



                          [place]                    all lab tests performed



                                                    



                                                    by:



                                                    



                                Division of Pathology and                 



                                                    Laboratory Medicine



                                                    



                                [address] MD COVID-19 (PRASHANTH-CoV-2) PCR Asymptomatic (2021 11:44 AM CST)Only the most 
recent of4 resultswithin the time period is included.





             Component    Value        Ref Range    Performed At Pathologist



                                                                 Signature

 

             COVID19 SARS Inpatient Admission              [facility] 

 

             COVID19 SARS Result Not Detected Not Detected [facility] 

 

             COVID19 SARS SARS-CoV-2 NOT Detected.              [facility] 



                          Reference Range: Not Detected                         

  



                                                                 



                                                    Methodology: The Abbott Real

Time SARS-CoV-2 assay is a qualitative real-time 

reverse transcription polymerase chain reaction (rRT-PCR) test to detect RNA 
from SARS-CoV-2 in nasal, nasopharyngeal and oropharyngeal swabs from patients 
with signs and                                              



                                                    symptoms of infection who ar

e suspected of COVID-19 by their health care 

provider. The Abbott RealTime SARS-CoV-2 performed on the Abbott m2000 System is
a dual target assay with primers and probes for the RdRp and N genes.           

                              



                                                                 



                                                    Results must be interpreted 

within the context of all relevant clinical and 

laboratory findings, and epidemiological risk factors. Positive results are 
indicative of the presence of SARS-CoV-2 RNA; clinical correlation with patient 
history and other                                           



                                                    diagnostic information is ne

cessary to determine patient infection status. 

Positive results do not rule out bacterial infection or co-infection with other 
viruses. Negative results do not preclude SARS-                                 

        



                          CoV-2 infection and should not be used as the sole    

                       



                          basis for patient management decisions.               

            



                                                                 



                                                    The Abbott RealTime SARS-CoV

-2 assay is for in vitro diagnostic use under FDA 

Emergency Use Authorization only. Testing is limited to laboratories certified 
under the Clinical Laboratory Improvement [place]                                   

      



                          ndments of 1988 (CLIA), 42U.S.C. 263a, to perform   

                        



                                                    high complexity tests. The T

est was performed by the CLIA-certified, high-

complexity Molecular Diagnostics Laboratory (MDL) at [facility] 
under the Food and Drug Administration (FDA)



s Emergency Use Authorization.                                         



                                                                 



                          Factsheet for patients: https://www.[employer].org/Abb

ottFactSheetPatients                           



                                                    Factsheet for healthcare pro

viders: 

https://www.[employer].org/AbbottFactSheetHCP                                   

      



                                                                 



                          Test performed by:                           



                          [facility]

cular Diagnostic Lab                           



                          [address] [place] [address]                           









                                        Specimen

 

                                        Nasopharyngeal Swab









                Performing Organization Address         City/[facility]/ZIP Code Phon

e Number

 

                [facility] Unless otherwise noted, [address]                    all lab tests performed



                                                    



                                                    by:



                                                    



                                Division of Pathology and                 



                                                    Laboratory Medicine



                                                    



                                [address] [place] [address]                 



.Serum Creatinine (2021 11:21 AM CST)Only the most recent of9 results
within the time period is included.





             Component    Value        Ref Range    Performed At Pathologist Sig

[facility]

 

             Creatinine   0.62Comment: Testing 0.51 - 0.95 mg/dL [facility]  



                          Performed at [facility]                           



                          [facility],                           



                          [address] [place] [address],                           



                          [address], [place], [place]                           



                          [address]                                  









                                        Specimen

 

                                        Blood









                Performing Organization Address         City/[facility]/ZIP Code Phon

e Number

 

                          [facility]               [address] [place] [address].



                          [place] [address]         



                                [address]                        



.CBC (2021 11:21 AM CST)Only the most recent of9 resultswithin the time 
period is included.





             Component    Value        Ref Range    Performed At Pathologist Sig

[facility]

 

             WBC          1.3 (L)      4.0 - 11.0 K/uL [facility]  

 

             RBC          3.75 (L)     4.00 - 5.50  [facility]  



                                       M/uL                      

 

             Hgb          10.8 (L)Comment: As 12.0 - 16.0  [facility]  



                          part of CBC or as an gm/dL                     



                          individual orderable                           



                          testing performed at                           



                          [facility] Amb[facility]                           



                          [facility], [address]                           



                          [facility], [address]                           



                          [address], [place] [address]                           

 

             Hct          33.3 (L)Comment: As 37.0 - 47.0 % [facility]  



                          part of CBC or as an                           



                          individual orderable                           



                          testing performed at                           



                          [facility], [address]                           



                          [facility], [address]                           



                          [address], [place] [address]                           

 

             MCV          89           82 - 98 fL   [facility]  

 

             MCH          28.8         27.0 - 31.0 pg [facility]  

 

             MCHC         32.4         31.0 - 36.0  [facility]  



                                       gm/dL                     

 

             RDW-SD       53.6 (H)     35.1 - 46.3 fL [facility]  

 

             RDW-CV       17.6 (H)     12.0 - 15.5 % [facility]  

 

             Platelet count 105 (L)Comment: As 140 - 440 K/uL [facility]  



                          part of CBC or as an                           



                          individual orderable                           



                          testing performed at                           



                          [facility], [address]                           



                          [facility], [address]                           



                          [address], [place] [address]                           

 

             MPV          8.8          4.0 - 10.4 fL [facility]  

 

             INRBC        0.0          <=0.0 %      [facility]  



                          Comment:                               



                          The INRBC (instrument NRBC) value reflects the enumera

tion                           



                          of nucleated red blood cells contained in a 200uL samp

le                           



                          of whole blood analyzed by the instrument. This value 

may                           



                          differ from the NRBC value reported in a manual differ

ential,                           



                          which is based on a 100 cell differential.            

               



                                                                 



                          As part of CBC testing performed at [facility]                           



                          [address] [facility], [place] [address]        

                   



                                                                 









                                        Specimen

 

                                        Blood









                          Narrative                 Performed At

 

                          Please schedule on  when patient is here for radia

tion [facility]









                Performing Organization Address         City/State/ZIP Code Phon

e Number

 

                          [facility]               [address] [facility].



                          [place] [address]         



                                [address]                        



Glomerular Filtration Rate (2021 11:21 AM CST)Only the most recent of9 
resultswithin the time period is included.





             Component    Value        Ref Range    Performed At Pathologist Sig

nature

 

             eGFR-AA      123          >=60 mL/min/1.73 [facility]  



                          Comment:     sq. m                     



                          Normal eGFR >= 60 mL/min/1.73 m2                      

     



                                                                 



                                                    Note: The eGFR is calculated

 using the CKD-EPI equation. The eGFR declines with

age. eGFR <60 mL/min/1.73 m2 is considered as "decreased". This equation should 
only be used for patients 18 and older.                                         



                                                                 



                                                    According to the National [facility]'s Kidney Disease Outcome Quality 

Initiative (KDOQI) classification and 2012 Kidney Disease Improving Global 
Outcomes (KDIGO) Clinical Practice Guideline, the stage of CKD should be 
categorized based on                                         



                          estimated GFR.                           



                                                                 



                          Stage Description    GFR mL/min/1.73 m2         

                  



                          1 Normal or high GFR      >=90              

             



                          2 Mildly decreased GFR        60-89   

                        



                          3a Mildly to moderately decreased GFR   45-59     

                      



                          3b Moderately to severely decreased GFR   30-44   

                        



                          4 Severely decreased GFR   15-29                

           



                          5 Kidney failure    <15                         

  



                                                                 



                                                                 



                                                    Testing Performed at [facility]

 [facility], [address] [facility] 

[address], [place] [address]                                         



                                                                 

 

             eGFR-ZORAIDA     107          >=60 mL/min/1.73 [facility]  



                          Comment:     sq. m                     



                          Normal eGFR >= 60 mL/min/1.73 m2                      

     



                                                                 



                                                    Note: The eGFR is calculated

 using the CKD-EPI equation. The eGFR declines with

age. eGFR <60 mL/min/1.73 m2 is considered as "decreased". This equation should 
only be used for patients 18 and older.                                         



                                                                 



                                                    According to the [facility]'s Kidney Disease Outcome Quality 

Initiative (KDOQI) classification and 2012 Kidney Disease Improving Global 
Outcomes (KDIGO) Clinical Practice Guideline, the stage of CKD should be 
categorized based on                                         



                          estimated GFR.                           



                                                                 



                          Stage Description    GFR mL/min/1.73 m2         

                  



                          1 Normal or high GFR      >=90              

             



                          2 Mildly decreased GFR        60-89   

                        



                          3a Mildly to moderately decreased GFR   45-59     

                      



                          3b Moderately to severely decreased GFR   30-44   

                        



                          4 Severely decreased GFR   15-29                

           



                          5 Kidney failure    <15                         

  



                                                                 



                                                    Testing Performed at [facility]

 [facility], [address] [facility] 

[address], [place] [address]                                         



                                                                 









                                        Specimen

 

                                        Blood









                Performing Organization Address         City/State/ZIP Code Phon

e Number

 

                          [facility]               [address] [facility].



                          [place] [address]         



                                [address]                        



Differential (2021 11:21 AM CST)Only the most recent of9 resultswithin the
time period is included.





             Component    Value        Ref Range    Performed At Pathologist



                                                                 Signature

 

             Neutrophil % 84.4 (H)Comment: As 42.0 - 66.0 % [facility]  



                          part of Differential                           



                          performed at [facility],                           



                          [address] [place] [address],                           



                          [address], [place], [place]                           



                          [id number]                                  

 

             Lymphocyte % 10.9 (L)     24.0 - 44.0 % [facility]  

 

             Monocyte %   3.1          2.0 - 7.0 %  [facility]  

 

             Eosinophil % 0.8 (L)      1.0 - 4.0 %  [facility]  

 

             Basophil %   0.0          0.0 - 1.0 %  [facility]  

 

             IGRE %       0.8 (H)      0.0 - 0.4 %  [facility]  



                          Comment:                               



                          IGRE % count includes Metamyelocytes, Myelocytes, and 

Promyelocytes.                           



                                                                 



                                                    As part of Differential perf

ormed at [facility], [address] [place] [address], [address], [place] [address]                                       

  



                                                                 

 

             Neutrophil Abs 1.09 (L)     1.70 - 7.30  [facility]  



                                       K/uL                      

 

             Lymphocyte Abs 0.14 (L)     1.00 - 4.80  [facility]  



                                       K/uL                      

 

             Monocyte Abs 0.04 (L)     0.08 - 0.70  [facility]  



                                       K/uL                      

 

             Eosinophil Abs 0.01 (L)     0.04 - 0.40  [facility]  



                                       K/uL                      

 

             Basophil Abs 0.00         0.00 - 0.10  [facility]  



                                       K/uL                      

 

             IG Abs       0.01         0.00 - 0.04  [facility]  



                                       K/uL                      









                                        Specimen

 

                                        Blood









                          Narrative                 Performed At

 

                          Please schedule on  when patient is here for radia

tion [facility]









                Performing Organization Address         City/[facility]/ZIP Code Phon

e Number

 

                          [facility]               [address] [place] [address].



                          [place] [address]         



                                [address]                        



BUN (2021 11:21 AM CST)Only the most recent of9 resultswithin the time 
period is included.





             Component    Value        Ref Range    Performed At [facility]

nature

 

             BUN          6Comment: Testing Performed 6 - 23 mg/dL [facility]  



                          at [facility], [address] [place] [address],                           



                          [address], [place] [address]                           









                                        Specimen

 

                                        Blood









                Performing Organization Address         City/State/ZIP Code Phon

e Number

 

                          [facility]               [address]



                          [place] [address]         



                                [address]                        



ALT (2021 11:21 AM CST)Only the most recent of8 resultswithin the time 
period is included.





             Component    Value        Ref Range    Performed At [facility]

nature

 

             ALT          40 (H)Comment: Testing <=33 U/L     [facility]  



                          Performed at [facility]                           



                          Ambulatory Care [facility], [address]                           



                          [facility], [address],                           



                          [place] [address]                           









                                        Specimen

 

                                        Blood









                Performing Organization Address         City/[facility]/ZIP Code Phon

e Number

 

                          [facility]               [address] [facility].



                          [place] [address]         



                                [address]                        



AST (2021 11:21 AM CST)Only the most recent of8 resultswithin the time 
period is included.





             Component    Value        Ref Range    Performed At [facility]ologist [facility]

[facility]

 

             AST          26Comment: Testing Performed <=32 U/L     [facility] 

 



                          at [facility] Care                           



                          [facility], [address] [facility] [facility], [address]                        

   



                          [address], [place] [address]                           









                                        Specimen

 

                                        Blood









                Performing Organization Address         City/State/ZIP Code Phon

e Number

 

                          [facility]               [address] [facility].



                          [place] [address]         



                                [address]                        



Magnesium (2021 11:21 AM CST)Only the most recent of8 resultswithin the 
time period is included.





             Component    Value        Ref Range    Performed At [facility]

[facility]

 

             Magnesium    1.8Comment: Testing 1.6 - 2.6 mg/dL [facility]  



                          Performed at [facility]                           



                          [facility],                           



                          [address] [facility] [facility], [address]                           



                          [address], [place] [id number]                           









                                        Specimen

 

                                        Blood









                Performing Organization Address         City/State/ZIP Code Phon

e Number

 

                          [facility]               [address].



                          [place] [address]         



                                [address]                        



Glucose Level (2021 11:21 AM CST)Only the most recent of9 resultswithin 
the time period is included.





             Component    Value        Ref Range    Performed At Pathologist [facility]

[facility]

 

             Glucose Level 176 (H)      70 - 99 mg/dL [facility]  



                          Comment:                               



                                                    Reference range is valid for

 fasting specimens only. Guidelines established by 

the American Diabetes Association guidelines (Standards of Medical Care in 
Diabetes 2016. Diabetes Care 2016; 39: S13-22) a                                

         



                          re that a fasting glucose of greater than or equal    

                       



                                                    to 126 mg/dL or a random glu

cose greater than or equal to 200 mg/dL with 

symptoms, that are confirmed by repeat testing on a different day, meet the 
criteria for diabetes mellitus.                                         



                                                                 



                                                    Testing Performed at [facility]

 [facility] Care [facility], [address], [address] 

[address], [place] [id number]                                         



                                                                 









                                        Specimen

 

                                        Blood









                Performing Organization Address         City/[facility]/ZIP Code Phon

e Number

 

                          [facility]               [address]



                          [place] [address]         



                                [address]                        



Calcium Level (2021 11:21 AM CST)Only the most recent of9 resultswithin 
the time period is included.





             Component    Value        Ref Range    Performed At [facility]

nature

 

             Calcium Lvl  9.3Comment: Testing 8.4 - 10.2 mg/dL [facility]  



                          Performed at [facility]                           



                          [facility],                           



                          [address] [facility],                           



                          [address], [place] [id number]                                  









                                        Specimen

 

                                        Blood









                Performing Organization Address         City/[facility]/ZIP Code Phon

e Number

 

                          [facility]               [address].



                          [place] [address]         



                                [address]                        



Bilirubin, total (2021 11:21 AM CST)Only the most recent of7 resultswithin
the time period is included.





             Component    Value        Ref Range    Performed At [facility]

nature

 

             Bili Total   0.4          <=1.2 mg/dL  [facility]  



                          Comment:                               



                                                    Indocyanine Green (ICG) may 

cause falsely elevated bilirubin results. Total and

direct bilirubin must not be measured from samples containing indocyanine green.
                                                            



                                                                 



                                                    False elevation of total krysten

irubin can be seen in patients with IgG 

concentrations above 28 g/L.                                         



                                                    Testing Performed at [facility]

 [facility], [address] [facility] [address], [address] 

[address], [place] [id number]                                         



                                                                 









                                        Specimen

 

                                        Blood









                Performing Organization Address         City/State/ZIP Code Phon

e Number

 

                          [facility]               [address].



                          [place] [address]         



                                [address]                        



Electrolyte Panel (2021 11:21 AM CST)Only the most recent of9 results
within the time period is included.





             Component    Value        Ref Range    Performed At [facility]

nature

 

             Sodium Lvl   140Comment: Testing 136 - 145 mEq/L [facility]  



                          Performed at [facility]                           



                          [facility],                           



                          [address],                           



                          [address], [place], [place]                           



                          [address]                                  

 

             Potassium Lvl 3.9Comment: Testing 3.5 - 5.1 mEq/L [facility]  



                          Performed at [facility]                           



                          [facility],                           



                          [address],                           



                          [address], [place] [id number]                                  

 

             Chloride     104Comment: Testing 98 - 107 mEq/L [facility]  



                          Performed at [facility]                           



                          [facility],                           



                          [address],                           



                          [address], [place] [id number]                                  

 

             CO2          25Comment: Testing 22 - 29 mEq/L [facility]  



                          Performed at [facility]                           



                          Ambulatory Care [facility],                           



                          [address],                           



                          [address], [place], [place]                           



                          [address]                                  

 

             Anion Gap    11Comment: Testing 4 - 14 mEq/L [facility]  



                          Performed at [facility]                           



                          Ambulatory Care [facility],                           



                          [address],                           



                          [address], [place], [place]                           



                          [address]                                  









                                        Specimen

 

                                        Blood









                Performing Organization Address         City/State/ZIP Code Phon

e Number

 

                          [facility]               [address] [facility].



                          [place] [address]         



                                [address]                        



Ultrasound Guidance - Intraoperative (with Radiologist) (CPT 62696) (2020 
8:55 AM CST)





                                        Specimen

 

                                        









                          Impressions               Performed At

 

                          Ultrasound guidance for placement of tandem and ovoids

. [id number]









                          Narrative                 Performed At

 

                                        This result has an attachment that is no

t available.



FULL RESULT:                            [id number]



                                                    



                          Examination: ULTRASOUND GUIDANCE - INTRAOPERATIVE on 1

2020 8:55 AM 



                                                    



                          Clinical History: Cervical cancer 



                                                    



                          Indication: Localization of [place] 



                                                    



                          Comparison: MRI 2020 



                                                    



                                        Technique: Operative ultrasound was perf

ormed in the radiation therapy 

department, in conjunction with radiation therapists, Dr. Ybarra 



                                                    



                          Findings:                 



                                        Additional saline was requested in the u

rinary bladder to position the uterus 

adequately in view of its extremely anteverted anteflexed position. 



                                                    



                                        Under ultrasound guidance sounds were pl

aced through the cervical canal, 

reaching up to 1 cm proximal to the uterine fundus, where a small amount of 
endometrial fluid was already located. This was confi 



                          rmed at real-time examination for satisfactory placeme

nt of ovoids. 



                                                    









                                        Procedure Note

 

                                        Interface, Radiology Results In - 2020  9:52 AM CST



Formatting of this note might be different from the original.



                                        FULL RESULT:



                                        



                                        Examination: ULTRASOUND GUIDANCE - INTRA

OPERATIVE on 2020 8:55 AM



                                        



                                        Clinical History: Cervical cancer



                                        



                                        Indication: Localization of Mass



                                        



                                        Comparison: MRI 2020



                                        



                                        Technique: Operative ultrasound was perf

ormed in the radiation therapy 

department, in conjunction with radiation therapistsDr. Ybarra



                                        



                                        Findings:



                                        Additional saline was requested in the u

rinary bladder to position the uterus 

adequately in view of its extremely anteverted anteflexed position.



                                        



                                        Under ultrasound guidance sounds were pl

aced through the cervical canal, 

reaching up to 1 cm proximal to the uterine fundus, where a small amount of 
endometrial fluid was already located. This was confirmed at real-time



                                        examination for satisfactory placement o

f ovoids.



                                        



                                        IMPRESSION:



                                        Ultrasound guidance for placement of tan

dem and ovoids.









                Performing Organization Address         City/State/ZIP Code Phon

e Number

 

                [id number]                                  



EKG, 12-Lead (Scheduled) (2020)





                                        Specimen

 

                                        









                          Narrative                 Performed At

 

                                        This result has an attachment that is no

t available.



                                        









                Performing Organization Address         City/State/ZIP Code Phon

e Number

 

                [facility]                                    



PETCT Contrast Enhanced Initial Treatment Strategy (2020  4:30 PM CST)





                                        Specimen

 

                                        









                          Impressions               Performed At

 

                                         



                                        [id number]



                          Primary neoplasm is noted within the cervix. Nodular d

ensity is noted 



                                        in the paracervical region, may represen

t a paracervical lymph node



                                        



                                         



                                        



                          Left common iliac lymph node has been biopsied and dem

onstrated no 



                                        evidence of lymphoid tissue.



                                        



                                         



                                        



                                         



                                        



                                                    









                          Narrative                 Performed At

 

                                        FULL RESULT:



                                        [id number]



                                         



                                        



                                        Examination: Contrast-Enhanced FDG PET

/CT, 2020 4:30 PM



                                        



                                         



                                        



                                        Clinical History: Squamous cell cancer o

f the cervix



                                        



                                         



                                        



                                        Indication: Evaluate disease status



                                        



                                         



                                        



                                        Comparison: None correlation was made to

 an MRI dated 2020



                                        



                                         



                                        



                          Technique: Following intravenous administration of 10.

9 mCi of F-18 



                          FDG via the left and cubital vein a PET/CT was perform

ed from the 



                          vertex of the skull to the proximal thighs. The blood 

glucose level at 



                          the time of injection was 105 mg per DL. CT scanning w

as done for 



                          attenuation correction, image registration, and diagno

sis with scan 



                          parameters optimized to minimize radiation exposure to

 the patient. 



                          The CT portion of the examination was performed with i

ntravenous and 



                          enteric contrast. SUV measurements are reported as max

imum SUV based 



                                        on body weight unless otherwise specifie

d. 



                                        



                                         



                                        



                                        Findings: 



                                        



                          Head and Neck: No FDG avid disease is noted within the

 brain. No neck 



                                        adenopathy is noted to suggest metastati

c disease.



                                        



                                         



                                        



                          Chest: Biopsy-proven pulmonary nodules are noted withi

n the thyroid 



                          gland. No lung nodules identified to suggest metastati

c disease. 



                          Nonspecific right upper lobe pulmonary nodule is noted

 measures less 



                                        than 5 mm and is unchanged since the [facility]

or study. 



                                        



                                         



                                        



                                        The heart is normal size.



                                        



                                         



                                        



                          Abdomen and Pelvis: No focal hepatic lesions identifie

d to suggest 



                          malignancy. There is no intrahepatic or extra hepatic 

biliary ductal 



                                        dilation. 



                                        



                                         



                                        



                          The spleen the adrenal glands and the pancreas are nor

mal. There is no 



                                        evidence hydronephrosis. The gallbladder

 is normal.



                                        



                                         



                                        



                          A left common iliac lymph node is noted measures 1.4 x

 1.1 cm is FDG 



                          avid has a maximum SUV of 4.5 has been biopsied previo

usly however no 



                                        lymphoid tissue is noted within this lym

ph node.



                                        



                                         



                                        



                          A mass is noted within the cervix has a maximum SUV of

 13.6. Left 



                          paracervical nodule is identified has a maximum SUV of

 4.0 and is of 



                          concern for metastatic disease. Fibroids are noted wit

hin the uterus. 



                                        Cysts are noted within the ovaries bilat

erally.



                                        



                                         



                                        



                                        Musculoskeletal: No definite skeletal me

tastases are noted.



                                        



                                                    









                                        Procedure Note

 

                                        Interface, Radiology Results In - 2020  8:56 AM CST



Formatting of this note might be different from the original.



                                        FULL RESULT:



                                        



                                        Examination:  Contrast-Enhanced FDG PET/

CT, 2020 4:30 PM



                                        



                                        Clinical History: Squamous cell cancer o

f the cervix



                                        



                                        Indication: Evaluate disease status



                                        



                                        Comparison: None correlation was made to

 an MRI dated 2020



                                        



                                        Technique: Following intravenous adminis

tration of 10.9 mCi of F-18 FDG via the 

left and cubital vein a PET/CT was performed from the vertex of the skull to the
proximal thighs. The blood glucose level at the time of



                                        injection was 105 mg per DL. CT scanning

 was done for attenuation correction, 

image registration, and diagnosis with scan parameters optimized to minimize 
radiation exposure to the patient. The CT portion of



                                        the examination was performed with intra

venous and enteric contrast. SUV 

measurements are reported as maximum SUV based on body weight unless otherwise 
specified.



                                        



                                        Findings:



                                        Head and Neck: No FDG avid disease is no

kristina within the brain. No neck adenopathy

is noted to suggest metastatic disease.



                                        



                                        Chest: Biopsy-proven pulmonary nodules a

re noted within the thyroid gland. No 

lung nodules identified to suggest metastatic disease. Nonspecific right upper 
lobe pulmonary nodule is noted measures less than 5 mm and is



                                        unchanged since the prior study.



                                        



                                        The heart is normal size.



                                        



                                        Abdomen and Pelvis: No focal hepatic les

ions identified to suggest malignancy. 

There is no intrahepatic or extra hepatic biliary ductal dilation.



                                        



                                        The spleen the adrenal glands and the pa

ncreas are normal. There is no evidence 

hydronephrosis. The gallbladder is normal.



                                        



                                        A left common iliac lymph node is noted 

measures 1.4 x 1.1 cm is FDG avid has a 

maximum SUV of 4.5 has been biopsied previously however no lymphoid tissue is 
noted within this lymph node.



                                        



                                        A mass is noted within the cervix has a 

maximum SUV of 13.6. Left paracervical 

nodule is identified has a maximum SUV of 4.0 and is of concern for metastatic 
disease. Fibroids are noted within the uterus.



                                        Cysts are noted within the ovaries bilat

erally.



                                        



                                        Musculoskeletal: No definite skeletal me

tastases are noted.



                                        



                                        IMPRESSION:



                                        



                                        Primary neoplasm is noted within the cer

vix. Nodular density is noted in the 

paracervical region, may represent a paracervical lymph node



                                        



                                        Left common iliac lymph node has been bi

opsied and demonstrated no evidence of 

lymphoid tissue.









                Performing Organization Address         City/[facility]/ZIP Code Phon

e Number

 

                [id number]                                  



Clot Expiration Date (2020  2:12 PM CST)





             Component    Value        Ref Range    Performed At Pathologist Sig

nature

 

             T & S Expiration 2020                [facility] CANCER 



                                                    CENTER       









                                        Specimen

 

                                        Blood









                Performing Organization Address         City/[facility] Code Phon

e Number

 

                [facility] CANCER Unless otherwise noted, [address]                    all lab tests performed



                                                    



                                                    by:



                                                    



                                Division of Pathology and                 



                                                    Laboratory Medicine



                                                    



                                [address] [place] [address] [place]                 



TMP Interpretation Antibody Screen Negative (2020  2:12 PM CST)





             Component    Value        Ref Range    Performed At Pathologist



                                                                 Signature

 

                          TMP Auto Neg ABSC          At the present time, patien

t plasma shows no evidence of RBC 

alloantibodies.                         [facility]      



             Interp       Comment:                  [facility] 



                          ____________________________________________________  

                         



                          MD Shahzad MORALES [id number]                           



                          Dictated by: MD Shahzad MORALES                

           



                                                    Dictated Date/Time: 20 20:03 PM CST  Transcribed Date/Time: 

2020 20:03 PM CST                                         



                          Electronically Signed By: MD Shahzad MORALES 

on 2020 20:03 PM                           



                                                                 









                                        Specimen

 

                                        Blood









                Performing Organization Address         City/[facility]/ZIP Code Phon

e Number

 

                [facility] CANCER Unless otherwise noted, [address]                    all lab tests performed



                                                    



                                                    by:



                                                    



                                Division of Pathology and                 



                                                    Laboratory Medicine



                                                    



                                [address] [place] [address] [place]                 



Partial Thromboplastin Time (2020  2:12 PM CST)





             Component    Value        Ref Range    Performed At Pathologist Sig

yuly

 

             aPTT         27.3         24.2 - 36.0  [facility]  



                          Comment:     second(s)                 



                          Testing Performed at                           



                          [facility]                          

 



                          [address]                          

 



                          [address] License: [id number]                           



                                                                 









                                        Specimen

 

                                        Blood









                          Narrative                 Performed At

 

                                        Please schedule at [facility]



                          This lab cannot be scheduled at the following location

s due to 



                          collection/proccessing restrictions: [facility]

 and [facility] 



                          CTR.                      









                Performing Organization Address         City/[facility]/ZIP Code Phon

e Number

 

                          [facility]               [address]



                          [place] [address]         



                                [address]                        



ABORh (2020  2:12 PM CST)





             Component    Value        Ref Range    Performed At Pathologist Sig

nature

 

             ABORh.       A POS                     [facility]

 









                                        Specimen

 

                                        Blood









                          Narrative                 Performed At

 

                          Please schedule at [facility] CANCER C

ENTER









                Performing Organization Address         City/[facility]/ZIP Code Phon

e Number

 

                [facility] CANCER Unless otherwise noted, [address]                    all lab tests performed



                                                    



                                                    by:



                                                    



                                Division of Pathology and                 



                                                    Laboratory Medicine



                                                    



                                [address] [place]                 



PT/INR (2020  2:12 PM CST)Only the most recent of2 resultswithin the time 
period is included.





             Component    Value        Ref Range    Performed At Pathologist Sig

nature

 

             PT           13.7         12.0 - 14.3  [facility]  



                          Comment:     second(s)                 



                          Testing Performed at                           



                          [facility]                          

 



                          [address]                          

 



                          [place] [address]                           



                                                                 

 

             INR          1.10         0.90 - 1.10  [facility]  



                          Comment:                               



                          Testing Performed at                           



                          [facility]                          

 



                          [address]                          

 



                          [place] [address]                           



                                                                 









                                        Specimen

 

                                        Blood









                          Narrative                 Performed At

 

                                        Please schedule at [facility]



                          This lab cannot be scheduled at the following location

s due to 



                          collection/proccessing restrictions: [facility]

 and [facility] 



                          CTR.                      









                Performing Organization Address         City/[facility]/ZIP Code Phon

e Number

 

                          [facility]               [address].



                          [place] [address]         



                                [address]                        



Antibody Screen (2020  2:12 PM CST)





             Component    Value        Ref Range    Performed At Pathologist Sig

nature

 

             ABSC.        Negative ABSC              [facility] CENTE

R 









                                        Specimen

 

                                        Blood









                          Narrative                 Performed At

 

                          Please schedule at [facility] CANCER C

ENTER









                Performing Organization Address         City/[facility]/ZIP Code Phon

e Number

 

                [facility] Unless otherwise noted, [address]                    all lab tests performed



                                                    



                                                    by:



                                                    



                                Division of Pathology and                 



                                                    Laboratory Medicine



                                                    



                                [address]                 



Confirm ABORh (2020  2:09 PM CST)





             Component    Value        Ref Range    Performed At Pathologist Sig

nature

 

             ABORh Confirm. A POS                     [facility] CENT

ER 









                                        Specimen

 

                                        Blood









                Performing Organization Address         City/State/ZIP Code Phon

e Number

 

                [facility] Unless otherwise noted, [address]                    all lab tests performed



                                                    



                                                    by:



                                                    



                                Division of Pathology and                 



                                                    Laboratory Medicine



                                                    



                                [address] [place] [address] [place]                 



Urinalysis with Microscopic (2020  2:08 PM CST)





             Component    Value        Ref Range    Performed At Pathologist Sig

nature

 

             UA WBC       107 (H)      0 - 2 /HPF   [facility]

 

 

             UA RBC       89 (H)       0 - 2 /HPF   [facility]

 

 

             UA Mucous    2+ (A)       TRACE /HPF   [facility]

 

 

             UA Bacteria  1+ (A)       NOT SEEN /HPF [facility]ENT[facility]

R 

 

             UA Squam Epi 4+ (A)       OCC /HPF     [facility]

 









                                        Specimen

 

                                        Urine









                          Narrative                 Performed At

 

                          Some reporting parameters within the Urinalysis test U

[facility]



                          have changed due to the implementation of new 



                          instrumentation in the [facility] [place], allowing greater 



                          sensitivity of measurement. Urinalysis results reporte

d 



                                        by the [facility] using exist

ing 



                                        



                          instrumentation, as well as Urinalysis testing perform

ed 



                          manually or by backup methodology at the [facility] [place] 



                          will remain relatively unchanged. New reporting 



                          parameters and units will now be reported for all 



                          campuses.                 









                Performing Organization Address         City/State/ZIP Code Phon

e Number

 

                [facility] Unless otherwise noted, [address]                    all lab tests performed



                                                    



                                                    by:



                                                    



                                Division of Pathology and                 



                                                    Laboratory Medicine



                                                    



                                [address] [place], [place] [address]d                 



Urinalysis with Microscopic (2020  2:08 PM CST)





             Component    Value        Ref Range    Performed At Pathologist Sig

nature

 

             UA Color     Yellow       Yellow       [facility] 

 

             UA Appear    Cloudy (A)   Clear        [facility] 

 

             UA Glucose   NEG          NEG mg/dL    [facility] 

 

             UA Bili      NEG          NEG          [facility] 

 

             UA Ketones   NEG          NEG mg/dL    [facility] 

 

             UA Spec Grav 1.018        1.002 - 1.035 [facility] 

 

             UA Blood     Moderate (A) NEG          [facility] 

 

             UA pH        5.0          4.5 - 8.0    [facility] 

 

             UA Protein   30 (A)       NEG mg/dL    [facility] 

 

             UA Urobilinogen NEG          NEG          [facility] 

 

             UA Nitrite   NEG          NEG          [facility] 

 

             UA Leuk Est  Moderate (A) NEG          [facility] 









                                        Specimen

 

                                        Urine









                          Narrative                 Performed At

 

                          Please schedule at [facility] CANCER C

ENTER









                Performing Organization Address         City/[facility] Code Phon

e Number

 

                [facility] CANCER Unless otherwise noted, [address]                    all lab tests performed



                                                    



                                                    by:



                                                    



                                Division of Pathology and                 



                                                    Laboratory Medicine



                                                    



                                [address]                 



Urine Culture (2020  2:08 PM CST)





             Component    Value        Ref Range    Performed At Pathologist



                                                                 Signature

 

             Final Report [id number] cfu/ml Normal site constantin present.           

   [facility] 



                          Generally of low significance.              [facility]

TER 



                          Correlate with clinical data and culture history.     

                      



                            (A)                                  

 

                          Path Review -             The results have been review

ed and electronically signed by 

Pathologist:                            [facility]      



             Urine        SHAHEED CHIRINOS MD #[id number]              [facility]

R 



                            (A)                                  









                                        Specimen

 

                                        Urine, Clean Catch









                          Narrative                 Performed At

 

                          Please schedule at [facility] CANCER C

ENTER









                Performing Organization Address         City/[facility]/ZIP Code Phon

e Number

 

                [facility] CANCER Unless otherwise noted, [address]                    all lab tests performed



                                                    



                                                    by:



                                                    



                                Division of Pathology and                 



                                                    Laboratory Medicine



                                                    



                                [address]                 



Urine HCG (2020  2:08 PM CST)Only the most recent of2 resultswithin the 
time period is included.





             Component    Value        Ref Range    Performed At Pathologist Sig

nature

 

             U beta hCG Ql Negative     Negative     [facility]  



                          Comment:                               



                                                    Very dilute urine specimens 

may cause false negative results. Suggest repeat in

48 hours with a first morning voided urine or request quantitative serum beta 
HCG test.                                                   



                                                                 



                          Testing Performed at                           



                          [facility]                          

 



                          [address]                          

 



                          [place] [address]                           



                                                                 









                                        Specimen

 

                                        Urine









                          Narrative                 Performed At

 

                          Please schedule at [facility]









                Performing Organization Address         City/[facility]/ZIP Code Phon

e Number

 

                          [facility]               [address].



                          [place] [address]         



                                [address]                        



IR CT GUIDED BIOPSY MUSCLE/SOFT TISSUE PELVIC (2020  2:42 PM CST)





                                        Specimen

 

                                        









                          Narrative                 Performed At

 

                                        This result has an attachment that is no

t available.



Date of Procedure: 20            



                                                    



                          Attending Physician: Mj Chun MD 



                                                    



                          Assistant: None         



                                                    



                          Pre Procedure Diagnosis: Malignant neoplasm of cervi

x uteri, not 



                          otherwise specified     



                                                    



                          Post Procedure Diagnosis: Unchanged 



                                                    



                          Indication: Evaluate for metastasis 



                                                    



                          Protocol Number: None   



                                                    



                          Title of Procedure:       



                          Percutaneous Computed Tomography-Guided Biopsy 



                                                    



                          Operative Findings:     



                          Percutaneous image-guided biopsy of 1.8cm left retrope

ritoneum lesion. 



                                                    



                          Consent: The procedure, risks, indications and alter

natives were 



                          explained. All questions were answered and informed co

nsent was obtained.  



                                                    



                                                    



                          I have reviewed the history and physical dictated by t

keith mid-level 



                          practitioner / fellow.    



                                                    



                          Sedation/Anesthesia:    



                          Moderate sedation for pain control and anxiety was adm

inistered by a 



                          dedicated nurse under my supervision. There was cont

inuous monitoring of 



                          oxygen saturation, heart rate and intermittent monitor

ing of blood 



                          pressure during the procedure. Medication given was 

midazolam and 



                          fentanyl.  I was present for the administration of t

keith medications 



                          indicated above.          



                          Procedure Events          



                          Event Event Time          



                          Sedation Start 2020 2:15 PM 



                          Sedation End 2020 2:41 PM 



                                                    



                                                    



                          Procedure in Detail:    



                          A time out was performed prior to the start of the pro

cedure and the 



                          correct patient, procedure, presence of consent, site,

 and side were 



                          confirmed with all members of the team. 



                                                    



                          With the patient in the prone position, the skin overl

marisela the area of 



                          interest was prepped and draped in the usual sterile f

ashion. Lidocaine 



                          1% was used for local anesthesia. 



                                                    



                          Using a posterior approach under Computed Tomography i

mage-guidance, a 17 



                          gauge needle was advanced down to the left retroperito

neum. An image was 



                          obtained and placed into the medical record. Samples w

ere obtained for 



                          evaluation.               



                                                    



                          Sampling:                 



                           Core Biopsy: An 18 gauge needle used to obtain sa

mples for surgical 



                          pathology evaluation. Total number of samples:  3 



                                                    



                          Specimens Disposition:    



                           Diagnostic Biopsy: The biopsy samples were submit

kristina to pathology. 



                                                    



                          Additional Comments: This was an exceedingly difficu

lt biopsy due to 



                          target location. If repeat biopsy is necessary due t

o non-diagnostic 



                          result, recommend considering an anterior approach. 



                                                    



                          Estimated Blood Loss: Minimal 



                                                    



                          Immediate Complications: None 



                                                    



                          Disposition: PACU       



                                                    



                          Plan:                     



                          1. No follow-up with Interventional Radiology required

. 



Pathology Biopsy Interpretation (2020  2:24 PM CST)Only the most recent of
2 resultswithin the time period is included.





             Component    Value        Ref Range    Performed At Pathologist Sig

nature

 

             Diagnosis    A. Lymph node(s), left pelvic:              [facility]

S  Electronically signed



                          Fibroadipose with myxohyaline change and blood element

s.                           by Timothy Paz MD 

on



                          No lymph node tissue identified (See comment)         

                  2020 at  3:52 PM



                                                                 



                          JL/BG                                  

 

             Comment      Repeat tissue              [facility]  



                          evaluation is                           



                          warranted.                             

 

                          Gross Description         A. Lymph Node(s), Left, Pelv

ic: Consists of five cores of pink

soft tissue that aggregate to 1.0 x 0.3 x 0.1 cm; entirely submitted in A1.  KR 

                          [facility]               

 

             Disclaimer   "Some tests reported              [facility]  



                          here may have been                           



                          developed and                           



                          performance                            



                          characteristics                           



                          determined by [facility]                           



                          Laboratory Medicine.                           



                          These tests have not                           



                          been specifically                           



                          cleared or approved by                           



                          the U.S. Food and Drug                           



                          Administration. If                           



                          applicable, controls                           



                          were reviewed and                           



                          showed appropriate                           



                          reactivity."                           









                                        Specimen

 

                                        Tissue - Lymph Node(s), Left, Pelvic









                Performing Organization Address         City/State/ZIP Code Phon

e Number

 

                          [facility]               [facility], [place] [address]         



                                [address]                 



US Fine Needle Aspiration (2020 10:38 AM CST)





                                        Specimen

 

                                        









                          Impressions               Performed At

 

                                         



                                        [id number]



                          1. Percutaneous Ultrasound-Guided Biopsy of a 3.5 cm r

ight inferior 



                                        pole thyroid nodule. 



                                        



                                         



                                        



                          2. Enlarged bilateral multinodular thyroid with no one

 nodule showing 



                                        specific internal features to suggest ma

lignancy.



                                        



                                         



                                        



                          3. Immediate cytologic assessment on 2020 of the 

biopsied right 



                          thyroid nodule is negative for malignancy with finding

s favoring 



                                        colloid nodule.. Final cytologic report 

is pending to be followed.



                                        



                                         



                                        



                                                    









                          Narrative                 Performed At

 

                                        FULL RESULT:



                                        [id number]



                                         



                                        



                          Examination: US HEAD NECK SOFT TISSUE, US FINE NEEDLE 

ASPIRATION on 



                                        2020 10:38 AM



                                        



                                         



                                        



                                        Clinical History: Multinodular thyroid.



                                        



                                         



                                        



                                        Indication: Multinodular thyroid with a 

3.5 cm nodule requiring FNA.



                                        



                                         



                                        



                                        Comparison: None.



                                        



                                         



                                        



                          Technique: Real-time grayscale and power Doppler ultra

sound of the 



                                        neck.



                                        



                                         



                                        



                          Findings: Ultrasound shows an enlarged right thyroid l

obe that 



                          measures 5.9 x 2.3 x 3 cm. Within the inferior pole of

 the right 



                          thyroid, there is a 3.5 x 1.8 x 2.9 cm nodule. The nod

ule is 



                          predominantly isoechoic to adjacent thyroid with regar

ds to the soft 



                          tissue, although shows several large internal cysts. N

o suspicious 



                                        findings.



                                        



                                         



                                        



                          A nodule in the right superior pole measures up to 1.6

 cm and shows 



                          comet tail artifact likely related to colloid. A right

 mid nodule 



                          measures up to 1.3 cm and is mixed cystic and solid wi

th no internal 



                                        suspicious features.



                                        



                                         



                                        



                          The left lobe measures 5.1 x 1.4 x 1.5 cm with several

 nodules all of 



                                        which measure under 1 cm without suspici

ous appearing features.



                                        



                                         



                                        



                                        No lymphadenopathy.



                                        



                                         



                                        



                                        The 3.5 cm right inferior nodule was gopal

ected for targeted FNA.



                                        



                                         



                                        



                          Ultrasound-guided fine-needle aspiration of the 3.5 cm

 right-sided 



                                        thyroid nodule:



                                        



                                         



                                        



                          Risks and benefits of procedure were explained to yun

ent. Informed 



                          consent was obtained. The right neck was cleansed with

 alcohol. 1% 



                                        Xylocaine was used for local anesthesia.



                                        



                                         



                                        



                          Ultrasound-guided fine needle aspiration of the 3.5 cm

 right thyroid 



                          nodule was performed using a 20-gauge needle under son

ographic 



                          guidance. One pass was made. A second pass was made wi

th a 25-gauge 



                                        needle.



                                        



                                         



                                        



                                         



                                        



                                        Immediate cytologic assessment:Adequate 

cellularity



                                        



                                         



                                        



                          Preliminary cytology result is negative for malignancy

 and final 



                                        results are pending. 



                                        



                                         



                                        



                                        Estimated Blood Loss: None.



                                        



                                         



                                        



                          Immediate Complications: There were no immediate compl

ications, and 



                                        the patient was discharged in stable con

dition.



                                        



                                                    









                                        Procedure Note

 

                                        Interface, Radiology Results In - 2020 11:38 AM CST



Formatting of this note might be different from the original.



                                        FULL RESULT:



                                        



                                        Examination: US HEAD NECK SOFT TISSUE, U

S FINE NEEDLE ASPIRATION on 2020 

10:38 AM



                                        



                                        Clinical History: Multinodular thyroid.



                                        



                                        Indication: Multinodular thyroid with a 

3.5 cm nodule requiring FNA.



                                        



                                        Comparison: None.



                                        



                                        Technique: Real-time grayscale and power

 Doppler ultrasound of the neck.



                                        



                                        Findings: Ultrasound shows an enlarged r

ight thyroid lobe that measures 5.9 x 

2.3 x 3 cm. Within the inferior pole of the right thyroid, there is a 3.5 x 1.8 
x 2.9 cm nodule. The nodule is predominantly isoechoic to



                                        adjacent thyroid with regards to the sof

t tissue, although shows several large 

internal cysts. No suspicious findings.



                                        



                                        A nodule in the right superior pole reginald

ures up to 1.6 cm and shows comet tail 

artifact likely related to colloid. A right mid nodule measures up to 1.3 cm and
is mixed cystic and solid with no internal suspicious features.



                                        



                                        The left lobe measures 5.1 x 1.4 x 1.5 c

m with several nodules all of which 

measure under 1 cm without suspicious appearing features.



                                        



                                        No lymphadenopathy.



                                        



                                        The 3.5 cm right inferior nodule was gopal

ected for targeted FNA.



                                        



                                        Ultrasound-guided fine-needle aspiration

 of the 3.5 cm right-sided thyroid 

nodule:



                                        



                                        Risks and benefits of procedure were exp

lained to patient. Informed consent was 

obtained. The right neck was cleansed with alcohol. 1% Xylocaine was used for 
local anesthesia.



                                        



                                        Ultrasound-guided fine needle aspiration

 of the 3.5 cm right thyroid nodule was 

performed using a 20-gauge needle under sonographic guidance. One pass was made.
A second pass was made with a 25-gauge needle.



                                        



                                        



                                        Immediate cytologic assessment:Adequate 

cellularity



                                        



                                        Preliminary cytology result is negative 

for malignancy and final results are 

pending.



                                        



                                        Estimated Blood Loss: None.



                                        



                                        Immediate Complications: There were no i

mmediate complications, and the patient 

was discharged in stable condition.



                                        



                                        IMPRESSION:



                                        



                                        1. Percutaneous Ultrasound-Guided Biopsy

 of a 3.5 cm right inferior pole thyroid

nodule.



                                        



                                        2. Enlarged bilateral multinodular thyro

id with no one nodule showing specific 

internal features to suggest malignancy.



                                        



                                        3. Immediate cytologic assessment on 2020 of the biopsied right thyroid 

nodule is negative for malignancy with findings favoring colloid nodule.. Final 
cytologic report is pending to be followed.









                Performing Organization Address         City/State/ZIP Code Phon

e Number

 

                [id number]                                  



US HEAD NECK SOFT TISSUE (2020 10:38 AM CST)





                                        Specimen

 

                                        









                          Impressions               Performed At

 

                                         



                                        [id number]



                          1. Percutaneous Ultrasound-Guided Biopsy of a 3.5 cm r

ight inferior 



                                        pole thyroid nodule. 



                                        



                                         



                                        



                          2. Enlarged bilateral multinodular thyroid with no one

 nodule showing 



                                        specific internal features to suggest ma

lignancy.



                                        



                                         



                                        



                          3. Immediate cytologic assessment on 2020 of the 

biopsied right 



                          thyroid nodule is negative for malignancy with finding

s favoring 



                                        colloid nodule.. Final cytologic report 

is pending to be followed.



                                        



                                         



                                        



                                                    









                          Narrative                 Performed At

 

                                        FULL RESULT:



                                        [id number]



                                         



                                        



                          Examination: US HEAD NECK SOFT TISSUE, US FINE NEEDLE 

ASPIRATION on 



                                        2020 10:38 AM



                                        



                                         



                                        



                                        Clinical History: Multinodular thyroid.



                                        



                                         



                                        



                                        Indication: Multinodular thyroid with a 

3.5 cm nodule requiring FNA.



                                        



                                         



                                        



                                        Comparison: None.



                                        



                                         



                                        



                          Technique: Real-time grayscale and power Doppler ultra

sound of the 



                                        neck.



                                        



                                         



                                        



                          Findings: Ultrasound shows an enlarged right thyroid l

obe that 



                          measures 5.9 x 2.3 x 3 cm. Within the inferior pole of

 the right 



                          thyroid, there is a 3.5 x 1.8 x 2.9 cm nodule. The nod

ule is 



                          predominantly isoechoic to adjacent thyroid with regar

ds to the soft 



                          tissue, although shows several large internal cysts. N

o suspicious 



                                        findings.



                                        



                                         



                                        



                          A nodule in the right superior pole measures up to 1.6

 cm and shows 



                          comet tail artifact likely related to colloid. A right

 mid nodule 



                          measures up to 1.3 cm and is mixed cystic and solid wi

th no internal 



                                        suspicious features.



                                        



                                         



                                        



                          The left lobe measures 5.1 x 1.4 x 1.5 cm with several

 nodules all of 



                                        which measure under 1 cm without suspici

ous appearing features.



                                        



                                         



                                        



                                        No lymphadenopathy.



                                        



                                         



                                        



                                        The 3.5 cm right inferior nodule was gopal

ected for targeted FNA.



                                        



                                         



                                        



                          Ultrasound-guided fine-needle aspiration of the 3.5 cm

 right-sided 



                                        thyroid nodule:



                                        



                                         



                                        



                          Risks and benefits of procedure were explained to yun

ent. Informed 



                          consent was obtained. The right neck was cleansed with

 alcohol. 1% 



                                        Xylocaine was used for local anesthesia.



                                        



                                         



                                        



                          Ultrasound-guided fine needle aspiration of the 3.5 cm

 right thyroid 



                          nodule was performed using a 20-gauge needle under son

ographic 



                          guidance. One pass was made. A second pass was made wi

th a 25-gauge 



                                        needle.



                                        



                                         



                                        



                                         



                                        



                                        Immediate cytologic assessment:Adequate 

cellularity



                                        



                                         



                                        



                          Preliminary cytology result is negative for malignancy

 and final 



                                        results are pending. 



                                        



                                         



                                        



                                        Estimated Blood Loss: None.



                                        



                                         



                                        



                          Immediate Complications: There were no immediate compl

ications, and 



                                        the patient was discharged in stable con

dition.



                                        



                                                    









                                        Procedure Note

 

                                        Interface, Radiology Results In - 2020 11:38 AM CST



Formatting of this note might be different from the original.



                                        FULL RESULT:



                                        



                                        Examination: US HEAD NECK SOFT TISSUE, U

S FINE NEEDLE ASPIRATION on 2020 

10:38 AM



                                        



                                        Clinical History: Multinodular thyroid.



                                        



                                        Indication: Multinodular thyroid with a 

3.5 cm nodule requiring FNA.



                                        



                                        Comparison: None.



                                        



                                        Technique: Real-time grayscale and power

 Doppler ultrasound of the neck.



                                        



                                        Findings: Ultrasound shows an enlarged r

ight thyroid lobe that measures 5.9 x 

2.3 x 3 cm. Within the inferior pole of the right thyroid, there is a 3.5 x 1.8 
x 2.9 cm nodule. The nodule is predominantly isoechoic to



                                        adjacent thyroid with regards to the sof

t tissue, although shows several large 

internal cysts. No suspicious findings.



                                        



                                        A nodule in the right superior pole reginald

ures up to 1.6 cm and shows comet tail 

artifact likely related to colloid. A right mid nodule measures up to 1.3 cm and
is mixed cystic and solid with no internal suspicious features.



                                        



                                        The left lobe measures 5.1 x 1.4 x 1.5 c

m with several nodules all of which 

measure under 1 cm without suspicious appearing features.



                                        



                                        No lymphadenopathy.



                                        



                                        The 3.5 cm right inferior nodule was gopal

ected for targeted FNA.



                                        



                                        Ultrasound-guided fine-needle aspiration

 of the 3.5 cm right-sided thyroid 

nodule:



                                        



                                        Risks and benefits of procedure were exp

lained to patient. Informed consent was 

obtained. The right neck was cleansed with alcohol. 1% Xylocaine was used for 
local anesthesia.



                                        



                                        Ultrasound-guided fine needle aspiration

 of the 3.5 cm right thyroid nodule was 

performed using a 20-gauge needle under sonographic guidance. One pass was made.
A second pass was made with a 25-gauge needle.



                                        



                                        



                                        Immediate cytologic assessment:Adequate 

cellularity



                                        



                                        Preliminary cytology result is negative 

for malignancy and final results are 

pending.



                                        



                                        Estimated Blood Loss: None.



                                        



                                        Immediate Complications: There were no i

mmediate complications, and the patient 

was discharged in stable condition.



                                        



                                        IMPRESSION:



                                        



                                        1. Percutaneous Ultrasound-Guided Biopsy

 of a 3.5 cm right inferior pole thyroid

nodule.



                                        



                                        2. Enlarged bilateral multinodular thyro

id with no one nodule showing specific 

internal features to suggest malignancy.



                                        



                                        3. Immediate cytologic assessment on 2020 of the biopsied right thyroid 

nodule is negative for malignancy with findings favoring colloid nodule.. Final 
cytologic report is pending to be followed.









                Performing Organization Address         City/[facility]/ZIP Code Phon

e Number

 

                [id number]                                  



Cytology Image-Guided FNA Interpretation (2020  8:39 AM CST)





             Component    Value        Ref Range    Performed At Pathologist



                                                                 Signature

 

             Gross Description Specimens procured:              [facility]  



                          2 Diff Quik; 6 Pap Stain Slides                       

    



                          10 ml, cloudy bloody fluid in RPMI                    

       



                          1 Cytospin                             



                                                                 



                                                                 



                          Size: 3.5 x 2.9 x 1.8 cm                           



                          Immediate assessment for specimen adequacy was made x1

 by Dr. Lewis MD.                           

 

             Immediate Assessment Adequate cellularity,              [facility]

  



                          favor benign                           

 

             Major Classification [facility]/benign               [facility]  Electro

nically



                                                                 signed by Eloisa Saucedo MD on



                                                                 2020 at 11

:49



                                                                 AM

 

             Diagnosis    A. Thyroid, inferior right lobe, fine needle aspiratio

n:              [facility]  

Electronically



                                                                 signed by Eloisa Mcnulty



                          Colloid nodule with cystic degeneration               

            MD Lewis on



                                                                 2020 at 11

:49



                                                                 AM

 

             Retained/Biomarker SR: 9 S                   [facility]  



             Testing                                             

 

             Informational Points Some tests reported              [facility]  



                          here may have been                           



                          developed and                           



                          performance                            



                          characteristics                           



                          determined by [facility]                           



                          and Laboratory                           



                          Medicine. These tests                           



                          have not been                           



                          specifically cleared                           



                          or approved by the                           



                          U.S. Food and Drug                           



                          Administration.                           









                                        Specimen

 

                                        Fine Needle Asp - Thyroid, Right Lobe









                Performing Organization Address         City/[facility]/ZIP Code Phon

e Number

 

                          [facility]               [place] [address] [id number] [facility]                 



CT Chest with Contrast (10/28/2020  7:24 AM CDT)





                                        Specimen

 

                                        









                          Impressions               Performed At

 

                          * Small bilateral pulmonary nodules are nonspecific 

and can be [id number]



                                        followed.



                                        



                          * Multiple small low-density nodules in the thyroid.

 Further 



                          evaluation with ultrasound can be performed if not pre

viously 



                                        evaluated.



                                        



                                         



                                        



                          I personally reviewed these image(s) along with the 



                          resident's/fellow's interpretations, certify that if a

 procedure was 



                          performed I was physically present, and agree with the

 final report. 









                          Narrative                 Performed At

 

                                        FULL RESULT:



                                        [id number]



                                         



                                        



                                        Examination: CT CHEST W CONTRAST, 10/28/

2020 7:24 AM



                                        



                                         



                                        



                          Clinical History: 49-year-old female with stage IB 1 s

quamous cell 



                                        carcinoma of the cervix and grade 1 endo

metrial cancer 



                                        



                                         



                                        



                                        Indication: Initial staging



                                        



                                         



                                        



                                        Comparison: None



                                        



                                         



                                        



                                        Technique: CT of the chest was performed

 using intravenous contrast.



                                        



                                         



                                        



                                        FINDINGS: 



                                        



                                         



                                        



                                        Lines and Tubes: None



                                        



                                         



                                        



                          Lungs and Airways: There is a 5 mm subpleural nodule i

n the right 



                          upper lobe (3/30). Additional small (3mm or less) pulm

onary nodules 



                          are seen series 3, images 53, 74, 87. No focal consoli

dations or 



                                        evidence of pulmonary edema. The central

 airways are patent.



                                        



                                         



                                        



                                        Pleura: No pleural effusion. No pneumoth

orax.



                                        



                                         



                                        



                          Cardiovascular: The pulmonary arteries are unremarkabl

e. The heart is 



                          normal in size. There is no pericardial effusion. Ther

e are no aortic 



                                        arch or coronary artery calcifications.



                                        



                                         



                                        



                          Lymph Nodes: No enlarged supraclavicular, axillary, me

diastinal, or 



                                        hilar lymph nodes.



                                        



                                         



                                        



                          Other Mediastinum: Multinodular right thyroid lobe (la

rgest nodule 



                                        measures 0.9 cm on series 2, image 11).



                                        



                                         



                                        



                          Bones and Soft Tissues: No aggressive osseous lytic or

 blastic 



                                        lesions.



                                        



                                         



                                        



                          Upper Abdomen: The visualized liver, gallbladder, sple

en, and adrenal 



                                        glands are within normal limits.



                                        



                                                    









                                        Procedure Note

 

                                        Interface, Radiology Results In - 10/28/

2020 11:17 AM CDT



Formatting of this note might be different from the original.



                                        FULL RESULT:



                                        



                                        Examination: CT CHEST W CONTRAST, 10/28/

2020 7:24 AM



                                        



                                        Clinical History: 49-year-old female wit

h stage IB 1 squamous cell carcinoma of 

the cervix and grade 1 endometrial cancer



                                        



                                        Indication: Initial staging



                                        



                                        Comparison: None



                                        



                                        Technique: CT of the chest was performed

 using intravenous contrast.



                                        



                                        FINDINGS:



                                        



                                        Lines and Tubes: None



                                        



                                        Lungs and Airways: There is a 5 mm subpl

eural nodule in the right upper lobe 

(3/30). Additional small (3mm or less) pulmonary nodules are seen series 3, 
images 53, 74, 87. No focal consolidations or evidence of



                                        pulmonary edema. The central airways are

 patent.



                                        



                                        Pleura: No pleural effusion. No pneumoth

orax.



                                        



                                        Cardiovascular: The pulmonary arteries a

re unremarkable. The heart is normal in 

size. There is no pericardial effusion. There are no aortic arch or coronary 
artery calcifications.



                                        



                                        Lymph Nodes: No enlarged supraclavicular

, axillary, mediastinal, or hilar lymph 

nodes.



                                        



                                        Other Mediastinum: Multinodular right th

yroid lobe (largest nodule measures 0.9 

cm on series 2, image 11).



                                        



                                        Bones and Soft Tissues: No aggressive os

seous lytic or blastic lesions.



                                        



                                        Upper Abdomen: The visualized liver, gal

lbladder, spleen, and adrenal glands are

within normal limits.



                                        



                                        IMPRESSION:



                                        *  Small bilateral pulmonary nodules are

 nonspecific and can be followed.



                                        *  Multiple small low-density nodules in

 the thyroid. Further evaluation with 

ultrasound can be performed if not previously evaluated.



                                        



                                        I personally reviewed these image(s) olivia

ng with the resident's/fellow's 

interpretations, certify that if a procedure was performed I was physically 
present, and agree with the final report.









                Performing Organization Address         City/State/ZIP Code Phon

e Number

 

                [id number]                                  



[facility] HIV 1/2 Ag&amp;Ab Path Interp (10/26/2020 10:16 AM CDT)





             Component    Value        Ref Range    Performed At Pathologist



                                                                 Signature

 

                          HIV 1/2 Ag&Ab             Negative for HIV-1 antigen a

nd HIV-1/HIV-2 antibodies. No 

laboratory evidence of HIV infection. If acute HIV infection is suspected, 
consider testing for HIV-1 RNA.                     [facility]    



             Interp       Comment:                  CENTER       



                          ____________________________________________________  

                         



                          CARLOS CISNEROS,                           



                          Dictated by: CARLOS CISNEROS,                    

       



                                                    Dictated Date/Time: 10.27.20

20 11:19 AM CDT  Transcribed Date/Time: 

10.27.2020 11:19 AM CDT                                         



                          Electronically Signed By: CARLOS CISNEROS, on  11:19 AM                           



                                                                 









                                        Specimen

 

                                        Blood









                Performing Organization Address         City/[facility]/ZIP Code Phon

e Number

 

                [facility] [address] [place] [address] 



HIV-1/2 Antigen and Antibodies, Fourth Generation (10/26/2020 10:16 AM CDT)





             Component    Value        Ref Range    Performed At Pathologist



                                                                 Signature

 

             HIV 1/2 Ag & Ab, Non Reactive Non Reactive [facility] 



             4th Gen      Comment:                  CENTER       



                          Performed at:                           



                          [facility]                        

   



                          [address] [place] [id number]                    

       



                                                                 









                                        Specimen

 

                                        Blood









                Performing Organization Address         City/[facility]/ZIP Code Phon

e Number

 

                [facility] [address] [place] [address] 



[place] Miscellaneous Test (10/26/2020 10:16 AM CDT)





             Component    Value        Ref Range    Performed At [facility] RL Test See Footnote              [facility] 



             Result       Comment:                  [facility] 



                                                                 



                                                    Test        

     Result     Flag Unit 

RefValue                                                  



                          ------------------------------------------------------

----------                           



                          HCV Ab Scrn w/Reflex to HCV PCR, S                    

       



                                                     HCV Ab Screen, S   

   Negative          Negative

                                                            



                            Signal-to-cutoff ratio is <1.00.                

           



                                                                 



                            Test Performed by:                           



                            [facility] [address] [facility]yn[facility]                           



                            [address] [place] [address]     

                      



                            : Terrell Hunt M.D. Ph.D.; CLIA

# [id number]                           



                                                                 









                                        Specimen

 

                                        Varies









                          Narrative                 Performed At

 

                          Hepatitis C Virus (HCV) Antibody Screen with Reflex to

 [facility]



                                        HCV RNA by PCR, Serum 



                                        



                           HCSRN                    









                Performing Organization Address         City/[facility]/ZIP Code Phon

e Number

 

                [facility] CANCER Unless otherwise noted, [place] [address] 



                          CENTER                    all lab tests performed



                                                    



                                                    by:



                                                    



                                Division of Pathology and                 



                                                    Laboratory Medicine



                                                    



                                [address] [place] [address]                 



Fractionated Bilirubin (10/26/2020 10:16 AM CDT)





             Component    Value        Ref Range    Performed At Pathologist



                                                                 Signature

 

             Bili Total   0.3          <=1.2 mg/dL  [facility] 



                          Comment:                               



                                                    Indocyanine Green (ICG) may 

cause falsely elevated bilirubin results. Total and

direct bilirubin must not be measured from samples containing indocyanine green.
                                                            



                                                                 



                                                    False elevation of total krysten

irubin can be seen in patients with IgG 

concentrations above 28 g/L.                                         



                                                    Testing performed at [facility], [address] [place] [id number]                                         



                                                                 

 

             Bili Direct  <0.2         <=0.3 mg/dL  [facility] 



                          Comment:                               



                                                    Indocyanine Green (ICG) may 

cause falsely elevated bilirubin results. Total and

direct bilirubin must not be measured from samples containing indocyanine green.
                                                            



                                                                 



                                                    Testing performed at [facility], [address] [place] [address], [place] [id number]                                         



                                                                 

 

             Bili Indirect See Note     0.0 - 0.9    [facility] 



                          Comment:     mg/dL                     



                                                    Unable to calculate Indirect

 Bilirubin result due to some parameters are 

outside reportable range                                         



                                                    Testing performed at [facility], [address] [place] [address]                                         



                                                                 









                                        Specimen

 

                                        Blood









                Performing Organization Address         City/[facility]/ZIP Code Phon

e Number

 

                          [address]                 



Total Protein (10/26/2020 10:16 AM CDT)





             Component    Value        Ref Range    Performed At [facility]

nature

 

             Total Protein 6.9Comment: Testing 6.4 - 8.3 g/dL [facility] 



                          performed at [facility], [address] [place] [id number]                           









                                        Specimen

 

                                        Blood









                Performing Organization Address         City/[facility]/ZIP Code Phon

e Number

 

                          [place] [id number]      



                                [address] [place] [address]                 



Alkaline Phosphatase (10/26/2020 10:16 AM CDT)





             Component    Value        Ref Range    Performed At [facility]

nature

 

             Alk Phos     97Comment: Testing 35 - 104 U/L [facility] 



                          performed at [facility], [address] [place] [address]                           









                                        Specimen

 

                                        Blood









                Performing Organization Address         City/[facility]/ZIP Code Phon

e Number

 

                          [place] [id number]      



                                [address] [place] [address]                 



Hemoglobin A1c (10/26/2020 10:16 AM CDT)





             Component    Value        Ref Range    Performed At [facility]

nature

 

             A1C          5.3          4.3 - 5.6 %  [facility] 



                          Comment:                  [facility] 



                          HbA1c values >=6.5% are diagnostic of diabetes mellitu

s.                           



                          Diagnosis should be confirmed by repeat testing.      

                     



                          Therapeutic Action suggested: >8.0% HbA1c; Goal of    

                       



                          therapy: <7.0% HbA1c                           



                                                                 









                                        Specimen

 

                                        Blood









                Performing Organization Address         City/State/ZIP Code Phon

e Number

 

                [facility]ER Unless otherwise noted, [place] [address] 



                          CENTER                    all lab tests performed



                                                    



                                                    by:



                                                    



                                Division of Pathology and                 



                                                    Laboratory Medicine



                                                    



                                [address]                 



Albumin Level (10/26/2020 10:16 AM CDT)





             Component    Value        Ref Range    Performed At [facility]

yuly

 

             Albumin Lvl  4.0Comment: Testing 3.5 - 5.2 gm/dL [facility] 



                          performed at [facility], [address] [place] [address]                           









                                        Specimen

 

                                        Blood









                Performing Organization Address         City/[facility]/ZIP Code Phon

e Number

 

                          [place] [address]      



                                [address] [facility]                 



Pathology Outside Interpretation (10/13/2020)





             Component    Value        Ref Range    Performed At [facility]

nature

 

                Materials Received Accession#, Stained, Block, Unstained Collect

ed Received                 [facility]

[facility]                                 



                          A. [id number], 4 SS, 0 BLOCK, 0 USS 10/13/2020 10/29/20

20                           

 

                Diagnosis       Four outside slides ([id number], 10/13/2020) rubina

gnated as follows:                 

[facility]                             Electronically signed



                                                                 by Duncan bhakta MD



                                                                 on 10/30/2020 a

t



                           Cervix, Biopsy (A):                           2:13 PM



                                                                 



                                                      INVASIVE  POORLY DIFFERENT

IATED  SQUAMOUS CELL CARCINOMA INFILTRATING THE 

ENTIRE THICKNESS OF THE TISSUE, 3 MM.                                         



                                                                 



                           Endometrium, Curettings:                           



                                                                 



                                                    ATYPICAL ENDOMETRIAL HYPERPL

ERIC WITH PAPILLARY FEATURES, FOCALLY BORDERING ON 

ADENOCARCINOMA GRADE 1, IN A BACKGROUND OF SECRETORY ENDOMETRIUM.               

                          



                                                                 



                                                                 

 

             Disclaimer   "Some tests reported              [facility]  



                          here may have been                           



                          developed and                           



                          performance                            



                          characteristics                           



                          determined by [facility] Pathology and                           



                          Laboratory Medicine.                           



                          These tests have not                           



                          been specifically                           



                          cleared or approved by                           



                          the U.S. Food and Drug                           



                          Administration. If                           



                          applicable, controls                           



                          were reviewed and                           



                          showed appropriate                           



                          reactivity."                           









                                        Specimen

 

                                        Tissue









                Performing Organization Address         City/[facility]/ZIP Code Phon

e Number

 

                          [facility] [place]               [place] [address]         



                                [address]                 



OSI US Pelvic (2020 11:48 AM CDT)





                                        Specimen

 

                                        









                          Narrative                 Performed At

 

                          Study acquired at another institution. For comparison 

only. No [facility] 



                                        



                          originated interpretation requested or available. 



after 2020



Insurance







          Payer     Benefit Plan / Subscriber ID Effective Dates Phone     Addre

ss   Type



                    Group                                             

 

          [employer] POS [id number] 10/28/2008-Present            

         HMO



          CARE      OPEN ACCESS                                         

 

          [employer] POS [id number] 10/28/2008-Present            

         HMO



          CARE      OPEN ACCESS                                         









           Guarantor Name Account Type Relation to Date of    Phone      Billing



                                 Patient    Birth                 Address

 

           Maricel Varner Personal/Family Self       1970 [phone] 







           [address]                                       (Home)     [place], [place]



                                                                  [id number]

 

           Maricel Varner Personal/Family Self       1970 [phone] 







           [address]                                       (Home)     [place], [place]



                                                                  [id number]







Advance Directives







                Code Status     Date Activated  Date Inactivated Comments

 

                Full Code       2021  9:04 AM 2021  2:18 PM 









                                                                

 

                Full Code       2021  9:20 AM 2021  2:06 PM 









                                                                

 

                Full Code       2021  9:32 AM 2021  1:40 PM 









                                                                

 

                Full Code       2020  9:26 AM 2020  1:15 PM 









                                                                

 

                Full Code       2020  9:25 AM 2020  1:40 PM

## 2021-09-14 NOTE — XMS REPORT
Continuity of Care Document

                          Created on:2021



Patient:WARREN PAGE

Sex:Female

:1970

External Reference #:[id number]





Demographics







                          Address                   [id number] [place] [address] [address]



                                                    [place] [address]

 

                          Home Phone                [phone]

 

                          Work Phone                [phone] CELL

 

                          Mobile Phone              [phone]

 

                          Email Address             [email or web address]

 

                          Preferred Language        en

 

                          Marital Status            Unknown

 

                          Scientology Affiliation     Unknown

 

                          Race                      Unknown

 

                          Additional Race(s)        Unavailable



                                                    White



                                                    Unavailable

 

                          Ethnic Group              Unknown









Author







                          Organization              [facility]

t

 

                          Address                   [address] [place] [address] [address]



                                                    [place] [address]

 

                          Phone                     [phone]









Support







                Name            Relationship    Address         Phone

 

                Telly          Unavailable     [id number]  [phone]



                                                [place] [address] 

 

                Telly          Unavailable     [id number]     [phone]



                                                [place] [address] 

 

                TELLY          Unavailable     [address] [address]  [phone]



                                                [place] [address] 

 

                Mosis           Friend          Unavailable     [phone]









Care Team Providers







                    Name                Role                Phone

 

                    [id number]               Primary Care Physician Unavailable

 

                    HALI              Attending Clinician Unavailable

 

                    SYSTEM,  NOT IN     Attending Clinician Unavailable

 

                    Mariah KUMAR           Attending Clinician [phone]

 

                    Juani SANDERS             Attending Clinician [phone]

 

                    Hali TOBIAS           Attending Clinician [phone]

 

                    Joanna TOBIAS         Attending Clinician [phone]

 

                    JOANNA            Attending Clinician Unavailable

 

                    MARIAH              Attending Clinician Unavailable

 

                    Kwame SANDERS           Attending Clinician [phone]

 

                    Charmaine TOBIAS          Attending Clinician [phone]

 

                    Cecily LARRY          Attending Clinician Unavailable

 

                    ANISHA Sarkar Attending Clinician [phone]

 

                    Elena TOBIAS          Attending Clinician [phone]

 

                    Lolita TOBIAS              Attending Clinician [phone]

 

                    STEVE Dougherty MD        Attending Clinician [phone]

 

                    Jalil EMANUEL           Attending Clinician Unavailable

 

                    Marianne TOBIAS            Attending Clinician [phone]

 

                    Karon TOBIAS            Attending Clinician [phone]

 

                    Chaitanya WOLF          Attending Clinician [phone]

 

                    Domenic RN,  N     Attending Clinician Unavailable

 

                    Erica LARRY,  L      Attending Clinician [phone]

 

                    RAJANI Avila MD.       Attending Clinician [phone]

 

                    Megan LARRY          Attending Clinician Unavailable

 

                    Linda TOBIAS         Attending Clinician [phone]

 

                    Rebecca WOLF         Attending Clinician Unavailable

 

                    Justino LARRY       Attending Clinician Unavailable

 

                    Bandar TOBIAS,  FAMILIA.      Attending Clinician [phone]

 

                    Jordi TOBIAS          Attending Clinician [phone]

 

                    BENJAMÍN Saeed MA        Attending Clinician Unavailable

 

                    DIANA Nolasco MA        Attending Clinician Unavailable

 

                    Luly Roberts,  K      Attending Clinician Unavailable

 

                    Yudi BROWN,  P     Attending Clinician [phone]

 

                    JUANI                Attending Clinician Unavailable

 

                    Herve Mcintyre  Attending Clinician Unavailable

 

                    Paul RN,  A        Attending Clinician Unavailable

 

                    Ray LARRY,  S      Attending Clinician Unavailable

 

                    Lay TOBIAS            Attending Clinician [phone]

 

                    Bharat Mccormick     Attending Clinician [phone]

 

                    BHARAT GOTTI        Attending Clinician Unavailable

 

                    Katiana SANDERS            Attending Clinician [phone]

 

                    Jacob EMANUEL,  L     Attending Clinician [phone]

 

                    Ashwin TOBIAS,  G       Attending Clinician [phone]

 

                    Christina TOBIAS,  S.      Attending Clinician [phone]

 

                    Cornelia              Attending Clinician Unavailable

 

                    DR SARA           Attending Clinician Unavailable

 

                    HALI              Admitting Clinician Unavailable

 

                    DR SARA           Admitting Clinician Unavailable









Payers







           Payer Name Policy Type Policy Number Effective Date Expiration Date SARINA CANNON [facility] POS            [id number] 2008-10-28 00:00:00            



           OPEN ACCESS                                             







Problems







       Condition Condition Condition Status Onset  Resolution Last   Treating Co

mments 

Source



       Name   Details Category        Date   Date   Treatment Clinician        



                                                 Date                 

 

       Infertilit Infertilit Disease Active -                             M

D



       y due to y due to               2-16                               Alpesh

o



       radiation radiation               00:00:                             n



                                   00                                 

 

       Encounter Encounter Disease Active -                             MD



       for    for                  2-06                               Anderso



       examinatio examinatio               00:00:                             n



       n prior to n prior to               00                                 



       antineopla antineopla                                                  



       stic   stic                                                    



       chemothera chemothera                                                  



       py     py                                                      

 

       Malignant Malignant Disease Active -                             MD



       neoplasm neoplasm               0-26                               Alpesh

o



       of cervix of cervix               00:00:                             n



       uteri  uteri                00                                 

 

       Complex Complex Disease Active 2020                             MD



       endometria endometria               0-                               An

derso



       l      l                    00:00:                             n



       hyperplasi hyperplasi               00                                 



       a      a                                                       

 

       Abnormal Abnormal Disease Active 2020                             MD



       uterine uterine               0-                               Anderso



       bleeding bleeding               00:00:                             n



                                   00                                 

 

       Malignant Malignant Disease Active 2020                             MD



       neoplasm neoplasm               0                               Alpesh

o



       of     of                   00:00:                             n



       endometriu endometriu               00                                 



       m      m                                                       

 

       Anxiety Anxiety Disease Active 2020                             MD



       disorder disorder                                              Alpesh

o



                                   00:00:                             n



                                   00                                 

 

       Essential Essential Disease Active 2020                             MD



       hypertensi hypertensi               0-                               An

derso



       on     on                   00:00:                             n



                                   00                                 

 

       Chronic Chronic Disease Active 2020                             MD



       depression depression               0-                               An

derso



                                   00:00:                             n



                                   00                                 







Allergies, Adverse Reactions, Alerts







       Allergy Allergy Status Severity Reaction(s) Onset  Inactive Treating Comm

ents 

Source



       Name   Type                        Date   Date   Clinician        

 

       No Known DA     Active U                                   [facility]



       Allergie                             2-15                        [place]



       s                                  00:00:                      [facility]



                                          00                          dic



                                                                      [facility]



                                                                      l







Family History







           Family Member Diagnosis  Comments   Start Date Stop Date  Source

 

           Maternal aunt Breast cancer                                  MD Raymon

son

 

           Maternal aunt Lung cancer                                  MD Rachel currie

 

           Maternal grandfather Lung cancer                                  MD Blackwell

 

           Maternal uncle Lung cancer                                  MD Betts

on

 

           Paternal aunt Breast cancer                                  MD Raymon

son

 

           Paternal grandmother Lung cancer                                  MD Blackwell

 

           Natural sister Cervical cancer                                  MD Bozena padilla







Social History







           Social Habit Start Date Stop Date  Quantity   Comments   Source

 

           Tobacco use and 2021 Never used            MD Betts

on



           exposure   00:00:00   00:00:00                         

 

           Alcohol intake 2021 Current drinker            MD Bozena padilla



                      00:00:00   00:00:00   of alcohol            



                                            (finding)             

 

           Alcohol Comment 2020-10-26 2020-10-26 social                MD Betts

on



                      00:00:00   00:00:00                         

 

           Sex Ass[birth year] At 1970                       MD Betts

on



           Birth      00:00:00   00:00:00                         









                Smoking Status  Start Date      Stop Date       Source

 

                Never smoker                                    MD Blackwell







Medications







       Ordered Filled Start  Stop   Current Ordering Indication Dosage Frequency

 Signature

                    Comments            Components          Source



     Medication Medication Date Date Medication? Clinician                (SIG) 

          



     Name Name                                                   

 

     estrogens,            Yes       Malignant .5g       Insert 0.5       

    MD



     conjugated,      7-21                neoplasm of           g into the      

     Anderso



     (Premarin)      00:00:                cervix           vagina 3           n



     vaginal      00                  uteri, not           (three)           



     cream                          otherwise           times a           



                                   specified           week           



                                                  Monday,           



                                                  Wednesday           



                                                  and            



                                                  Friday.           

 

     estrogens,            Yes       Malignant 1{tbl}      Take 1         

  MD



     conjugated,      7-20                neoplasm of           tablet by       

    Anderso



     -medroxyPRO      00:00:                cervix           mouth           n



     GESTERone      00                  uteri, not           daily.           



     (Prempro)                          otherwise                          



     0.625                          specified                          



     mg-2.5 mg                                                        



     per tablet                                                        

 

     phenazopyri      2020        Malignant 100mg      Take 1        

   MD



     dine      30           neoplasm of           tablet           Raymon

so



     (Pyridium)      00:00: 05:59           cervix           (100 mg)           

n



     100 mg      00   :00            uteri, not           by mouth 3           



     tablet                          otherwise           (three)           



                                   specified           times a           



                                                  day for 7           



                                                  days.           

 

     ondansetron      2020        Malignant 8mg       Take 1         

  MD



     (Zofran) 8      2 02-01           neoplasm of           tablet (8       

    Anderso



     mg tablet      00:00: 00:00           cervix           mg) by           n



               00   :00            uteri, not           mouth           



                                   otherwise           every 8           



                                   specified           (eight)           



                                                  hours as           



                                                  needed for           



                                                  nausea.           

 

     ondansetron      2020        Malignant 8mg       Take 1         

  MD



     (Zofran) 8      07           neoplasm of           tablet (8       

    Anderso



     mg tablet      00:00: 00:00           cervix           mg) by           n



               00   :00            uteri, not           mouth           



                                   otherwise           every 8           



                                   specified           (eight)           



                                                  hours as           



                                                  needed for           



                                                  nausea.           

 

     POTASSIUM      2020- No             1{tbl}      Take 1           MD



     ORAL                                tablet by           Anderso



               15:33: 00:00                          mouth           n



               37   :00                           daily.           

 

     aspirin 81      2020 No             81mg      Take 81 mg           

MD



     mg EC                                by mouth           Anderso



     tablet      15:33: 00:00                          daily.           n



               33   :00                                          

 

     LYSINE ORAL      2020- No             1{tbl}      Take 1           M

D



                                         tablet by           Anderso



               15:33: 00:00                          mouth           n



               33   :00                           daily.           

 

     prochlorper      2020 No        Malignant 10mg      Take 1         

  MD



     azine                 neoplasm of           tablet (10           

Anderso



     (Compazine)      00:00: 00:00           cervix           mg) by           n



     10 mg      00   :00            uteri, not           mouth           



     tablet                          otherwise           every 6           



                                   specified           (six)           



                                                  hours as           



                                                  needed for           



                                                  nausea or           



                                                  vomiting.           

 

     ondansetron      2020 2020- No        Malignant           Take 1         

  MD



     (Zofran) 8      1-22 11-30           neoplasm of           tablet by       

    Anderso



     mg tablet      00:00: 00:00           cervix           mouth           n



               00   :00            uteri, not           every 8           



                                   otherwise           hours on           



                                   specified           days 2, 3,           



                                                  and 4 of           



                                                  chemothera           



                                                  py each           



                                                  week, then           



                                                  may take 1           



                                                  tablet by           



                                                  mouth           



                                                  every 8           



                                                  hours as           



                                                  needed for           



                                                  nausea or           



                                                  vomiting.           

 

     Trintellix      2020      Yes            1{tbl}      Take 1           MD



     10 mg tab      0-23                               tablet by           Raymon

so



               00:00:                               mouth           n



               00                                 daily.           

 

     busPIRone            Yes            1{tbl}      Take 1           MD



     (BUSPAR)      8-21                               tablet by           Alpesh

o



     7.5 mg      00:00:                               mouth 3           n



     tablet      00                                 (three)           



                                                  times a           



                                                  day.           

 

     lisinopril-            Yes            1{tbl}      Take 1           MD



     hydroCHLORO      8-20                               tablet by           And

erso



     thiazide      00:00:                               mouth           n



     (PRINZIDE,Z      00                                 daily.           



     ESTORETIC)                                                        



     20-25 mg                                                        



     per tablet                                                        

 

     Trintellix Trintellix       Yes  Talia                TAKE 1         

  [facility]



               4-05           [place]                TABLET BY           [facility]



               00:00:                               MOUTH           [facility]



               00                                 EVERY DAY           l



                                                                 [facility]



                                                                 ent



                                                                 Clinics

 

     Zestoretic Zestoretic           Yes  Talia                take 1           

[facility]



                              [place]                tablet           [facility]



                                                  every day           [facility]



                                                                 l



                                                                 [facility]pat[facility]



                                                                 ent



                                                                 Clinics

 

     BusPIRone BusPIRone           Yes  Talia                1 tablet           

[facility]



     HCl  HCl                 [place]                               [facility]



                                                                 [facility]



                                                                 l



                                                                 Outpati



                                                                 ent



                                                                 Clinics







Vital Signs







             Vital Name   Observation Time Observation Value Comments     Source

 

             WEIGHT       2020 10:48:00 89.3 kg                   

 

             WEIGHT       2020 10:48:00 89.3 kg                   

 

             Systolic blood pressure 2021 19:46:00 148 mm[Hg]             

   MD Blackwell

 

             Diastolic blood pressure 2021 19:46:00 95 mm[Hg]             

    MD Blackwell

 

             Heart rate   2021 19:46:00 77 /min                   MD Raymon don

 

             Respiratory rate 2021 19:46:00 17 /min                   MD ANISHA estes

 

             Body weight  2021 19:46:00 90.6 kg                   MD Raymon don

 

             BMI          2021 19:46:00 31.91 kg/m2               MD Raymon don

 

             Oxygen saturation in 2021 19:46:00 98 /min                   

MD Blackwell



             Arterial blood by Pulse                                        



             oximetry                                            

 

             Body temperature 2021 13:56:27 36.39 Mercedes                 MD ANISHA estes

 

             Body height  2020 14:24:00 168.5 cm                  MD Raymon don







Procedures







                Procedure       Date / Time Performed Performing Clinician Sourc

e

 

                OSI MAMMO BILATERAL 2021 21:33:00 Raul Lal MD

 

                OSI MAMMO BILATERAL 2021 21:27:00 Raul Lal MD

 

                CYTOLOGY GYN INTERPRETATION 2021 20:43:00 Lencho Lemus MD

 

                CYTOLOGY HPV 16/18 GENOTYPING 2021 20:43:00 Kael Lemus



                AND HIGH RISK POOL                                 

 

                PETCT SUBSEQUENT TREATMENT 2021 14:41:42 Lencho Lemus



                STRATEGY                                        

 

                POC GLUCOSE SCREEN 2021 13:07:00 Lencho Lemus MD

on

 

                PETCT CONTRAST ENHANCED 2021 14:06:47 Ina Barrera MD



                SUBSEQUENT TREATMENT STRATEGY                                 

 

                POC CREATININE  2021 12:28:00 Ina Barrera MD

 

                POC GLUCOSE SCREEN 2021 12:28:00 Ina Barrera MD

on

 

                MRI PELVIS W WO CONTRAST - 2021 23:17:00 Lencho Lemus



                MUSCULOSKELETAL                                 

 

                XR HIP 2 VW BILATERAL W 2021 19:32:29 Lencho Lemus MD



                PELVIS                                          

 

                MRI PELVIS W WO CONTRAST - 2021 13:18:14 Tunde Davenport MD



                GYN                                             

 

                (CT) INSERTION OF UTERINE 2021 13:53:00 Paula Mchugh MD



                TANDEM AND VAGINAL OVOID FOR                                 



                CLINICAL BRACHYTHERAPY                                 

 

                PELVIC EXAMINATION UNDER 2021 13:53:00 Paula Mchugh MD



                ANESTHESIA                                      

 

                REMOTE AFTERLOADING HDR 2021 13:53:00 Paula Mchugh MD



                RADIONUCLIDE                                    



                BRACHYTHERAPY,2-12 CHANNELS                                 

 

                CT SIMULATION   2021 13:53:00 Paula Mchugh MD

 

                COVID-19 (SARS-COV-2) 2021-01-10 16:45:00 Oma Decker MD



                PCR-ASYMPTOMATIC MC                                 

 

                (CT) INSERTION OF UTERINE 2021 13:26:00 Rahel Lopes MD



                TANDEM AND VAGINAL OVOID FOR                                 



                CLINICAL BRACHYTHERAPY                                 

 

                PELVIC EXAMINATION UNDER 2021 13:26:00 Rahel Lopes MD



                ANESTHESIA                                      

 

                REMOTE AFTERLOADING HDR 2021 13:26:00 Rahel Lopes MD



                RADIONUCLIDE                                    



                BRACHYTHERAPY,2-12 CHANNELS                                 

 

                CT SIMULATION   2021 13:26:00 Rahel Lopes MD

 

                HC 2019-NCOV COVID-19 2021 17:44:00 Marianne, Rahel Wagner

 

                COMPLETE BLOOD COUNT W/ 2021 17:21:00 Ina Barrera MD



                DIFFERENTIAL                                    

 

                BASIC METABOLIC PANEL, 2021 17:21:00 Ina Barrera MDson



                CALCIUM TOTAL                                   

 

                BILIRUBIN TOTAL 2021 17:21:00 Ina Barrera MD

 

                ALANINE AMINOTRANSFERASE 2021 17:21:00 Ina Barrera MD

 

                ASPARTATE AMINOTRANSFERASE 2021 17:21:00 Ina Barrera

 

                MAGNESIUM LEVEL 2021 17:21:00 Ina Barrera MD

 

                Results CBC     2021 17:21:00 Ina Barrera MD

 

                MANUAL DIFFERENTIAL 2021 17:21:00 Ina Barrera MD

son

 

                GLUCOSE LEVEL   2021 17:21:00 Ina Barrera MD

 

                ELECTROLYTE PANEL 2021 17:21:00 Ina Barrera MDo

n

 

                SERUM CREATININE 2021 17:21:00 Ina Barrera MD

 

                .GLOMERULAR FILTRATION RATE 2021 17:21:00 Ina Barrera MD

 

                CALCIUM LEVEL TOTAL 2021 17:21:00 Ina Barrera MD Raymon

son

 

                BLOOD UREA NITROGEN 2021 17:21:00 Ina Barrera MD

son

 

                (CT) INSERTION OF UTERINE 2021 13:37:00 Angeles Ybarra MD



                TANDEM AND VAGINAL OVOID FOR                                 



                CLINICAL BRACHYTHERAPY                                 

 

                PELVIC EXAMINATION UNDER 2021 13:37:00 Angeles Ybarra MD



                ANESTHESIA                                      

 

                REMOTE AFTERLOADING HDR 2021 13:37:00 Angeles Ybarra MDrson



                RADIONUCLIDE                                    



                BRACHYTHERAPY,2-12 CHANNELS                                 

 

                CT SIMULATION   2021 13:37:00 Angeles Ybarra MD

 

                COVID-19 (SARS-COV-2) 2021 15:52:00 Umu Guzman MD And

adriana



                PCR-ASYMPTOMATIC                                  

 

                COMPLETE BLOOD COUNT W/ 2020 17:13:00 Ina Barrera MD



                DIFFERENTIAL                                    

 

                BASIC METABOLIC PANEL, 2020 17:13:00 Ina Barrera MD

derson



                CALCIUM TOTAL                                   

 

                BILIRUBIN TOTAL 2020 17:13:00 Ina Barrera MD

 

                ALANINE AMINOTRANSFERASE 2020 17:13:00 Ina Barrera MD

 

                ASPARTATE AMINOTRANSFERASE 2020 17:13:00 Ina Barrera

 

                MAGNESIUM LEVEL 2020 17:13:00 Ina Barrera MD

 

                Results CBC     2020 17:13:00 Ina Barrera MD

 

                MANUAL DIFFERENTIAL 2020 17:13:00 Ina Barrera MD Raymon

son

 

                GLUCOSE LEVEL   2020 17:13:00 Ina Barrera MD

 

                ELECTROLYTE PANEL 2020 17:13:00 [facility]

[facility]

 

                SERUM CREATININE 2020 17:13:00 Ina Barrera MD

 

                .GLOMERULAR FILTRATION RATE 2020 17:13:00 Ina Barrera MD

 

                CALCIUM LEVEL TOTAL 2020 17:13:00 Ina Barrera MD Raymon

son

 

                BLOOD UREA NITROGEN 2020 17:13:00 Ina Barrera MD Raymon

son

 

                (CT) INSERTION OF UTERINE 2020 13:31:00 Angeles Ybarra MD



                TANDEM AND VAGINAL OVOID FOR                                 



                CLINICAL BRACHYTHERAPY                                 

 

                PELVIC EXAMINATION UNDER 2020 13:31:00 Angeles Ybarra MD



                ANESTHESIA                                      

 

                REMOTE AFTERLOADING HDR 2020 13:31:00 Angeles Ybarra MDrson



                RADIONUCLIDE                                    



                BRACHYTHERAPY,2-12 CHANNELS                                 

 

                CT SIMULATION   2020 13:31:00 Angeles Ybarra MD

 

                POC GLUCOSE SCREEN 2020 13:14:00 Angeles Ybarra MD

on

 

                COVID-19 (SARS-COV-2) 2020 15:54:00 Oma Decker MD



                PCR-ASYMPTOMATIC MC                                 

 

                COMPLETE BLOOD COUNT W/ 2020 17:56:00 Ina Barrera MD



                DIFFERENTIAL                                    

 

                BASIC METABOLIC PANEL, 2020 17:56:00 Ina Barrera MD

derson



                CALCIUM TOTAL                                   

 

                BILIRUBIN TOTAL 2020 17:56:00 Ina Barrera MD

 

                ALANINE AMINOTRANSFERASE 2020 17:56:00 Ina Barrera MD

 

                ASPARTATE AMINOTRANSFERASE 2020 17:56:00 Ina Barrera

 

                MAGNESIUM LEVEL 2020 17:56:00 Ina Barrera MD

 

                Results CBC     2020 17:56:00 Ina Barrera MD

 

                MANUAL DIFFERENTIAL 2020 17:56:00 Ina Barrera MD

son

 

                GLUCOSE LEVEL   2020 17:56:00 Ina Barrera MD

 

                ELECTROLYTE PANEL 2020 17:56:00 Ina Barrera MDo

n

 

                SERUM CREATININE 2020 17:56:00 Ina Barrera MD

 

                .GLOMERULAR FILTRATION RATE 2020 17:56:00 Ina Barrera MD

 

                CALCIUM LEVEL TOTAL 2020 17:56:00 Ina Barrera MD Raymon

son

 

                BLOOD UREA NITROGEN 2020 17:56:00 Ina Barrera MD

 

                ULTRASOUND GUIDANCE - 2020 14:55:32 Jackelyn Davenport MD



                INTRAOPERATIVE                                  

 

                (CT) INSERTION OF UTERINE 2020 13:38:00 Angeles Ybarra MD



                TANDEM AND VAGINAL OVOID FOR                                 



                CLINICAL BRACHYTHERAPY                                 

 

                INTRAOPERATIVE ULTRASOUND FOR 2020 13:38:00 Scott Ybarra MD



                BRACHYTHERAPY                                   

 

                PELVIC EXAMINATION UNDER 2020 13:38:00 Angeles Ybarra MD



                ANESTHESIA                                      

 

                REMOTE AFTERLOADING HDR 2020 13:38:00 Angeles Ybarra MD



                RADIONUCLIDE                                    



                BRACHYTHERAPY,2-12 CHANNELS                                 

 

                CT SIMULATION   2020 13:38:00 Angeles Ybarra MD

 

                HC 2019-NCOV COVID-19 2020 17:57:00 Jackelyn Davenport MD

 

                MRI PELVIS W WO CONTRAST - 2020 01:23:00 Tunde Davenport MD



                GYN                                             

 

                MAGNESIUM LEVEL 2020 15:51:00 Jackelyn Davenport MD And

erson

 

                COMPLETE BLOOD COUNT W/ 2020 15:51:00 Jackelyn Davenport MD



                DIFFERENTIAL                                    

 

                BASIC METABOLIC PANEL, 2020 15:51:00 Jackelyn Davenport MD



                CALCIUM TOTAL                                   

 

                Results CBC     2020 15:51:00 Jackelyn Davenport MD And

erson

 

                GLUCOSE LEVEL   2020 15:51:00 Jackelyn Davenport MD And

erson

 

                MANUAL DIFFERENTIAL 2020 15:51:00 Jackelyn Davenport MD

 

                BLOOD UREA NITROGEN 2020 15:51:00 Jackelyn Davenport MD

 

                ELECTROLYTE PANEL 2020 15:51:00 Jackelyn Davenport MD

 

                SERUM CREATININE 2020 15:51:00 Jackelyn Davenport MD

 

                .GLOMERULAR FILTRATION RATE 2020 15:51:00 Good Davenport MD

 

                CALCIUM LEVEL TOTAL 2020 15:51:00 Jackelyn Davenport MD

 

                EKG, 12-LEAD (SCHEDULED) 2020 00:00:00 Ana Combs MD

 

                COMPLETE BLOOD COUNT W/ 2020 22:01:00 Ina Barrera MD



                DIFFERENTIAL                                    

 

                BASIC METABOLIC PANEL, 2020 22:01:00 Ina Barrera MD



                CALCIUM TOTAL                                   

 

                BILIRUBIN TOTAL 2020 22:01:00 Ina Barrera MD

 

                ALANINE AMINOTRANSFERASE 2020 22:01:00 Ina Barrera MD

 

                ASPARTATE AMINOTRANSFERASE 2020 22:01:00 Ina Barrera

 

                MAGNESIUM LEVEL 2020 22:01:00 Ina Barrera MD

 

                Results CBC     2020 22:01:00 Ina Barrera MD

 

                MANUAL DIFFERENTIAL 2020 22:01:00 Ina Barrera MD Raymon

son

 

                GLUCOSE LEVEL   2020 22:01:00 Ina Barrera MD

 

                ELECTROLYTE PANEL 2020 22:01:00 Ina Barrera MD

 

                SERUM CREATININE 2020 22:01:00 Ina Barrera MD

 

                .GLOMERULAR FILTRATION RATE 2020 22:01:00 Ina Barrera MD

 

                CALCIUM LEVEL TOTAL 2020 22:01:00 Ina Barrera MD Raymon

son

 

                BLOOD UREA NITROGEN 2020 22:01:00 Ina Barrera MD Raymon

son

 

                COMPLETE BLOOD COUNT W/ 2020 20:41:13 Ina Barrera MDrson



                DIFFERENTIAL                                    

 

                BASIC METABOLIC PANEL, 2020 20:41:13 Ina Barrera MD



                CALCIUM TOTAL                                   

 

                BILIRUBIN TOTAL 2020 20:41:13 Ina Barrera MD

 

                ALANINE AMINOTRANSFERASE 2020 20:41:13 Ina Barrera MD

 

                ASPARTATE AMINOTRANSFERASE 2020 20:41:13 Ina Barrera

 

                MAGNESIUM LEVEL 2020 20:41:13 Ina Barrera MD

 

                Results CBC     2020 20:41:13 Ina Barrera MD

 

                MANUAL DIFFERENTIAL 2020 20:41:13 Ina Barrera MD Raymon

son

 

                GLUCOSE LEVEL   2020 20:41:13 Ina Barrera MD

 

                BLOOD UREA NITROGEN 2020 20:41:13 Ina Barrera MD Raymon

son

 

                ELECTROLYTE PANEL 2020 20:41:13 Ina Barrera MD

 

                SERUM CREATININE 2020 20:41:13 Ina Barrera MD

 

                .GLOMERULAR FILTRATION RATE 2020 20:41:13 Ina Barrera MD

 

                CALCIUM LEVEL TOTAL 2020 20:41:13 Ina Barrera MD Raymon

son

 

                BASIC METABOLIC PANEL, 2020 21:52:00 Unke, Ina     MD An

derson



                CALCIUM TOTAL                                   

 

                BILIRUBIN TOTAL 2020 21:52:00 Ina Barrera MD

 

                ALANINE AMINOTRANSFERASE 2020 21:52:00 Ina Barrera MD

 

                ASPARTATE AMINOTRANSFERASE 2020 21:52:00 Ina Barrera

 

                MAGNESIUM LEVEL 2020 21:52:00 Ina Barrera MD

 

                COMPLETE BLOOD COUNT W/ 2020 21:52:00 Ina Barrera MD



                DIFFERENTIAL                                    

 

                GLUCOSE LEVEL   2020 21:52:00 Ina Barrera MD

 

                ELECTROLYTE PANEL 2020 21:52:00 Ina Barrera MD Andquintino

rosey

 

                SERUM CREATININE 2020 21:52:00 Ina Barrera MD

 

                .GLOMERULAR FILTRATION RATE 2020 21:52:00 Ina Barrera MD

 

                CALCIUM LEVEL TOTAL 2020 21:52:00 Ina Barrera MD Raymon

son

 

                Results CBC     2020 21:52:00 Ina Barrera MD

 

                MANUAL DIFFERENTIAL 2020 21:52:00 Ina Barrera MD Raymon

son

 

                BLOOD UREA NITROGEN 2020 21:52:00 Ina Barrera MD Raymon

son

 

                COMPLETE BLOOD COUNT W/ 2020 20:43:32 Ina Barrera MDrson



                DIFFERENTIAL                                    

 

                BASIC METABOLIC PANEL, 2020 20:43:32 Ina Barrera MDson



                CALCIUM TOTAL                                   

 

                BILIRUBIN TOTAL 2020 20:43:32 Ina Barrera MD

 

                ALANINE AMINOTRANSFERASE 2020 20:43:32 Ina Barrera MD

 

                ASPARTATE AMINOTRANSFERASE 2020 20:43:32 Ina Barrera

 

                MAGNESIUM LEVEL 2020 20:43:32 Ina Barrera MD

 

                Results CBC     2020 20:43:32 Ina Barrera MD

 

                MANUAL DIFFERENTIAL 2020 20:43:32 Ina Barrera MD Raymon

son

 

                GLUCOSE LEVEL   2020 20:43:32 Ina Barrera MD

 

                BLOOD UREA NITROGEN 2020 20:43:32 Ina Barrera MD Raymon

son

 

                ELECTROLYTE PANEL 2020 20:43:32 Ina Barrera MD Anderso

n

 

                SERUM CREATININE 2020 20:43:32 Ina Barrera MD

 

                .GLOMERULAR FILTRATION RATE 2020 20:43:32 Ina Barrera MD

 

                CALCIUM LEVEL TOTAL 2020 20:43:32 Ina Barrera MD Raymon don

 

                PETCT CONTRAST ENHANCED 2020 22:30:00 Ina Barrera MD



                INITIAL TREATMENT STRATEGY                                 

 

                PROTHROMBIN TIME 2020 20:12:00 Ina Barrera MD

 

                TYPE AND SCREEN 2020 20:12:00 Ina Barrera MD

 

                APTT            2020 20:12:00 Ina Barrera MD

 

                ABORH           2020 20:12:00 Ina Barrera MD

 

                ANTIBODY SCREEN 2020 20:12:00 Ina Barrera MD

 

                CLOT EXPIRATION DATE 2020 20:12:00 Ina Barrera MD Vincent

rson

 

                TMP INTERPRETATION ANTIBODY 2020 20:12:00 Ina Barrera MD



                SCREEN NEGATIVE                                 

 

                CONFIRM ABORH TYPE 2020 20:09:00 Ina Barrera MD

on

 

                URINE CULTURE   2020 20:08:00 Ina Barrera MD

 

                URINALYSIS WITH MICROSCOPIC 2020 20:08:00 Ina Barrera MD



                IF INDICATED                                    

 

                HUMAN CHORIONIC GONADOTROPIN, 2020 20:08:00 Ina Barrera MD



                QUALITATIVE, URINE                                 

 

                URINALYSIS MICROSCOPIC 2020 20:08:00 Ina Barrera MD

[facility] 2019-NCOV COVID-19 2020 15:21:00 Raul Lal MD And

erson

 

                IR CT GUIDED BIOPSY 2020 20:42:54 Ina Barrera MD Raymon don



                MUSCLE/SOFT TISSUE PELVIC                                 

 

                PATHOLOGY BIOPSY 2020 20:24:00 Ina Barrera MD



                INTERPRETATION                                  

 

                PROTHROMBIN TIME 2020 17:41:00 Himanshu Gotti MD

 

                HUMAN CHORIONIC GONADOTROPIN, 2020 17:31:00 Himanshu Gotti MD



                QUALITATIVE, URINE                                 

 

                US HEAD NECK SOFT TISSUE 2020 16:38:15 Ina Barrera MD

 

                US FINE NEEDLE ASPIRATION 2020 16:38:15 Ina Barrera MD

 

                CYTOLOGY IMAGE-GUIDED FNA 2020 14:39:00 Ina Barrera MD



                INTERPRETATION                                  

 

                MRI PELVIS W WO CONTRAST - 2020-10-30 21:36:00 Ina Barrera



                GYN                                             

 

                CT CHEST W CONTRAST 2020-10-28 12:24:00 Ina Barrera MD Raymon

son

 

                PATHOLOGY BIOPSY 2020-10-26 15:20:00 Ina Barrera MD



                INTERPRETATION                                  

 

                COMPREHENSIVE METABOLIC PANEL 2020-10-26 15:16:00 Ina Barrera MD

 

                COMPLETE BLOOD COUNT W/ 2020-10-26 15:16:00 Ina Barrera MD

nderson



                DIFFERENTIAL                                    

 

                HEMOGLOBIN A1C  2020-10-26 15:16:00 Ina Barrera MD

 

                HC HIV 1/2 AG AND AB 4TH GEN 2020-10-26 15:16:00 Ina Barrera MD

 

                GLUCOSE LEVEL   2020-10-26 15:16:00 Ina Barrera MD

 

                BLOOD UREA NITROGEN 2020-10-26 15:16:00 Ina Barrera MD Raymon

son

 

                ELECTROLYTE PANEL 2020-10-26 15:16:00 [facility]

n

 

                SERUM CREATININE 2020-10-26 15:16:00 Ina Barrera MD

 

                .GLOMERULAR FILTRATION RATE 2020-10-26 15:16:00 Ina Barrera MD

 

                CALCIUM LEVEL TOTAL 2020-10-26 15:16:00 Ina Barrera MD Raymon

son

 

                ALBUMIN LEVEL   2020-10-26 15:16:00 Ina Barrera MD

 

                ALKALINE PHOSPHATASE 2020-10-26 15:16:00 Ina Barrera MD Vincent

rson

 

                ALANINE AMINOTRANSFERASE 2020-10-26 15:16:00 Ina Barrera MD

 

                ASPARTATE AMINOTRANSFERASE 2020-10-26 15:16:00 Ina Barrera

 

                TOTAL PROTEIN   2020-10-26 15:16:00 Ina Barrera MD

 

                FRACTIONATED BILIRUBIN 2020-10-26 15:16:00 Ina Barrera MD

derson

 

                Results CBC     2020-10-26 15:16:00 Ina Barrera MD

 

                MANUAL DIFFERENTIAL 2020-10-26 15:16:00 Ina Barrera MD Raymon

son

 

                TMP HIV 1/2 AG&AB PATH INTERP 2020-10-26 15:16:00 Ina Barrera MD

 

                MICHELLE MISCELLANEOUS TEST 2020-10-26 15:16:00 Ina Barrera MD

nderson

 

                HC -NCOV COVID-19 2020-10-22 18:14:00 Raul Lal MD And

adriana

 

                PATHOLOGY OUTSIDE 2020-10-13 00:00:00 Carla Vasquez MD Andquintino

rosey



                INTERPRETATION                                  

 

                OSI US PELVIC   2020 16:48:17 Raul Lal MD







Plan of Care







             Planned Activity Planned Date Details      Comments     Source

 

             Future Scheduled Test 1984 00:00:00 COVID-19 Vaccination     

         MD Blackwell



                                       (1) [code = COVID-19              



                                       Vaccination (1)]              







Encounters







        Start   End     Encounter Admission Attending Care    Care    Encounter 

Source



        Date/Time Date/Time Type    Type    Clinicians Facility Department ID   

   

 

        2020-10-27         Inpatient         BAILEE LAL     GYN     [id number] 

MD



        17:10:12                         RAUL currie

 

        2020-10-21         Outpatient         SYSTEM, BAILEE     MDA     [id number]

 MD



        10:32:06                         PROVIDER                         Alpesh currie

 

        2021 Outpatient                 [facility]  [facility]  [id number]

 [facility]



        00:00[facility]00 [facility]00:00                                                 [facility]



                                                                        [facility]



                                                                        [facility]



                                                                        [facility]in[facility]

 

        2021 Outpatient BAILEE HOROWITZ     MDA     [id number]

[id number] MD



        16:21:30 16:21:30                 RAUL currie

 

        2021 Outpatient BAILEE HOROWITZ     MDA     [id number]

[id number] MD



        15:55:57 15:55:57                 RAUL currie

 

        2021 Outpatient BAILEE HOROWITZ     MDA     [id number]

[id number] MD



        15:55:53 15:55:53                 RAUL currie

 

        2021 Outpatient BAILEE HOROWITZ     MDA     [id number]

[id number] MD



        15:55:50 15:55:50                 RAUL currie

 

        2021 Outpatient BAILEE HOROWITZ     MDA     [id number]

[id number] MD



        15:55:44 15:55:44                 RAUL currie

 

        2021 Outpatient       BAILEE LAL     MDA     [id number]

[id number] MD



        15:55:40 15:55:40                 RAUL currie

 

        2021 Outpatient BAILEE HOROWITZ     MDA     [id number]

[id number] MD



        15:55:36 15:55:36                 RAUL currie

 

        2021 Outpatient BAILEE HOROWITZ     MDA     [id number]

[id number] MD



        15:55:29 15:55:29                 RAUL currie

 

        2021 Outpatient BAILEE RIVAS     MDA     [id number]

[id number] MD



        14:03:47 23:59:00                 ANGELES currie

 

        2021 Outpatient IVY LEMUS MDA     MDA     [id number]

[id number] MD



        07:42:28 07:42:28                 LENCHO currie

 

        2021 Outpatient                 [facility]  [id number]

 [facility]



        00[facility]00                                                 [facility]



                                                                        [facility]



                                                                        [facility]



                                                                        Clinics

 

        2021 Outpatient                 [facility]  [id number]

 [facility]



        00:00[facility]00 [facility]00:00                                                 [facility]



                                                                        [facility]



                                                                        [facility]



                                                                        Clinics

 

        2021 Outpatient                 [facility]  [id number]

 [facility]



        00:00[facility]00 [facility]00:00                                                 [facility]



                                                                        [facility]



                                                                        [facility]



                                                                        Clinics

 

        2020 Outpatient                 [facility]  [id number]

 [facility]



        00:00[facility]00 00[facility]00:00                                                 [facility]



                                                                        [facility]



                                                                        [facility]



                                                                        Clinics

 

        2020 Outpatient BAILEE HOROIWTZ     MDA     [id number]

[id number] MD



        10:26:34 13:05:48                 RAUL currie

 

        2020 Outpatient INA DURAN MDA     MDA     [id number]

[id number] MD



        14:20:38 14:20:38                                                 Alpesh currie

 

        2020 Outpatient INA DURAN MDA     MDA     [id number]

[id number] MD



        13:48:22 13:48:22                                                 Alpesh currie

 

        2020 Outpatient INA DURAN MDA     MDA     [id number]

[id number] MD



        09:14:15 09:43:40                                                 Alpesh currie

 

        2020-11-10 2020-11-10 Outpatient INA DURAN MDA     MDA     [id number]

[id number] MD



        09:02:42 09:42:36                                                 Alpesh currie

 

        2020 Outpatient INA DURAN MDA     MDA     107

[id number] MD



        12:41:43 23:59:00                                                 Alpesh currie

 

        2020 Outpatient HIMANSHU LIRA MDA     MDA     [id number]

[id number] MD



        11:31:33 12:40:00                                                 Alpesh currie

 

        2020 Outpatient INA DURAN MDA     MDA     107

[id number] MD



        07:37:43 07:37:43                                                 Alpesh currie

 

        2020 Outpatient IVY LAL MDA     MDA     [id number]

290 MD



        10:30:00 23:59:00                 RAUL currie

 

        2020-10-30 2020-10-30 Outpatient INA DURAN MDA     MDA     107

[id number] MD



        14:40:32 14:40:32                                                 Alpesh currie

 

        2020-10-30 2020-10-30 Outpatient                 STLMLC  STLMLC  [id number]

 [facility]



        00:00:00 00:00:00                                                 [facility]

 

        2020-10-28 2020-10-28 Outpatient IVY LAL, MDA     MDA     [id number]

270 MD



        11:46:26 11:46:26                 RAUL currie

 

        2020-10-28 2020-10-28 Outpatient INA DURAN MDA     MDA     107

[id number] MD



        07:02:02 07:02:02                                                 Alpesh currie

 

        2020-10-26 2020-10-26 Outpatient INA DURAN MDA     MDA     107

[id number] MD



        09:58:12 10:17:54                                                 Alpesh currie

 

        2020-10-26 2020-10-26 Outpatient EL      HALI, MDA     MDA     [id number]

228 MD



        08:01:09 08:01:09                 RAUL currie

 

        2020-10-26 2020-10-26 Outpatient EL      HALI, MDA     MDA     [id number]

533 MD



        07:54:14 07:56:50                 RAUL currie

 

        2020-10-22 2020-10-22 Outpatient EL      HALI, MDA     MDA     [id number]

419 MD



        13:06:29 13:06:29                 RAUL currie

 

        2020 Outpatient                 [facility]

[id number] [facility]



        08:20:00 08:20:00                         t [facility]



                                                Medicine                 Outpati



                                                                        ent



                                                                        Clinics

 

        2020 Outpatient                 [facility] [address]

[id number] [facility]



        16:20:00 16:20:00                         t [facility]



                                                Medicine                 Outpati



                                                                        ent



                                                                        Clinics

 

        2020 Outpatient                 [facility]

[id number] [facility]



        16:00:00 16:00:00                         t [facility]  Medicine         l



                                                Medicine                 Outpati



                                                                        ent



                                                                        Clinics

 

        2020 Outpatient                 [facility]

[id number] [facility]



        15:12:00 15:12:00                         t [facility]



                                                Medicine                 Outpati



                                                                        ent



                                                                        Clinics

 

        2020 Outpatient                 [facility]

[id number] [facility]



        15:00:00 15:00:00                         t [facility]  Medicine         l



                                                Medicine                 Outpati



                                                                        ent



                                                                        Clinics

 

        2020 Outpatient                 [facility] [address]

[id number] [facility]



        17:35:00 17:35:00                         t [facility]



                                                Medicine                 Outpati



                                                                        ent



                                                                        Clinics

 

        2020 Outpatient                 [facility] [address]

[id number] [facility]



        16:20:00 16:20:00                         t [facility]  Medicine         l



                                                Medicine                 Outpati



                                                                        ent



                                                                        Clinics

 

        2020 Outpatient                 [facility] [address]

[id number] [facility]



        11:36:00 11:36:00                         t [facility]  Medicine         l



                                                Medicine                 Outpati



                                                                        ent



                                                                        Clinics

 

        2019-10-24 2019-10-24 Outpatient                 [facility] [address]

[id number] [facility]



        08:00:00 08:00:00                         [facility]



                                                Medicine                 Outpati



                                                                        ent



                                                                        Clinics

 

        2019 Outpatient                 [facility] [address]

[id number] [facility]



        16:00:00 16:00:00                         t [address]         [place]

[facility]



                                                                        [facility]



                                                                        [facility]

 

        2019 Outpatient                 [facility] [address]

[id number] [facility]



        11:46:00 11:46:00                         t [facility]



                                                                        ent



                                                                        [facility]

 

        2019 Outpatient                 [address]

[id number] [facility]



        16:40:00 16:40:00                         t [facility]



                                                                        ent



                                                                        [facility]

 

        2018 Outpatient FAIZAN RUIZ,  [facility]    [id number]

[address]



        03:56:00 05:45:00                 QUINTIN                          Med[facility]

[facility]

 

        2018 Outpatient FAIZAN RUIZ,  [facility]    [id number]

[address]



        04:10:00 07:30:00                 QUINTIN                          Medica

[facility]

 

        [id number]

[address]



        04:29:00 06:32:00                 [facility]







Results







           Test Description Test Time  Test Comments Results    Result     [facility]

e



                                                       Comments   

 

           [facility] Mammo  2021            Study acquired at            MD Vincent brannon



                      21:33:33              another               



                                            institution. For            



                                            comparison only. No            



                                            MD Blackwell            



                                            originated            



                                            interpretation            



                                            requested or            



                                            available.            









                    Cytology GYN Interpretation 2021 14:07:09 









                      Test Item  Value      Reference Range Interpretation Comme

nts









                          Gross                     

h9zoxMGpKHKqmWNKQQWtR3xzlrXxJPIfbMAqK6VqukfhNVozVU4kYY0mhGpfvVEehUCcUF2HHKMiNwSp

PYGcuLGdqmXfAuSpKQPraCBvzXI7ZULzLJ4fkkmsVBivOCuyWZIxbuI3FGGwjZUwQ9EvRFIaVU9azumh

SNU8JLmheA5scxVLNqbpSn5evUTnuUltOgHyTcYjMHXkCOHtWNPaiBbbMFF                     

                    



                          Description               

jFOk8mF0TLjodL85tj8N3Zog9HGCgBPCuV4DqNK9jSINxuJLaU01XVarcPTA0PVHQAwmbJYSyOL1Xw1s

mFTTugERlMDT1HKhlfFMiRPTzUUUzPDj6APLfXPtbyGFvDY6obJzhMzvynKvbp8FkhNSlNDtwLPBiPKN

fMRpgLJRqJV0DJkUiPNKkBBS7VnlsAHe7BFa4VH8ORzMkTLGiQEl2QOV7Gk                     

                    



                          (test code =              

SjTSd9FPiuHQ6AGJp0Eqp7WQB6SdR3BIE0EUBsOEDqVjQyXOIfWQRyRRkpGFhkdjEsNVAdOXDmUJerVj

vfZRysW97cbRxbdY0eSwjjyzPtRRF4LUJsukEUEpwjvMTtfzrosUlzQhBtzVHkFhFiOLnvjHPqaFUuUQ

zvbnItfzkgQIWYQimiwHKasJrwIlYyHtDfQMYaXFJ3hdMRFZGmHNScZYevt                     

                    



                          [id number])               

eIfDPq7FLYRRxhmpJIywpbieHhxZkVugIJdTkEivCptdT22NNOlmJNwETZ5DX4aVUAagloxAOCxGHClV

QL3KFaluE85xZTaDYSfAQPnnOHicM5JACIzORR7PVuslD49wHAfHI2GYTVhXBJ4XNBazWZtOWF1AS9lm

Q0KfQ==                                                     

 

                          Specimen                  

o5ebuBYzNDAeeSMqHpSaUEXrFYZwu5wiIKVjkOXoMeQsJkMeYnMpSgzomJJbFGFxOnHyc2pps968qQFp

i5fmGBMaLmK1fCLmCWTvlPPdJ566IXFqJGaef5apg0HfONYqsRSwx3X0JOQEevekxJf2c1miOvClEm5l

fSGMo1CfqVEhKS0wrqk1kPdrY08bt0R8EjtmS4foABVaXCMwW3JiEQ3zQRV                     

                    



                          Information               

hZdx9AFB1EHO2WHUtUPKeL2IwMD2cFUPlcUAjBObxscRzPStmtiCrkaWwGtk2QRH2QCW0KKZoJZXbZCl

nrlZsjcMiFgy6BCStRBR7KUClQGU6UScaxuGlxjGlMdv2NWJlA796IOZ4kLspo5egDVF9ZZMnCFKkGlH

pLn4gwFSbL650QPEyDUDUWYUxfOl7FOKusfIbmaFqoKEDk621P546RVIwFT                     

                    



                          (test code =              

TuEXz1AhHZLYNukjp0oY1jQQJcan26fSvruzExq23vpEOhNGaePcQjQKVjLkawdHSry74qLRqxjZg9l8

zxniRxfKZKUNCidbw1iL0xG437BUB3HOYzZJr7TWnkhMUhlD2tuGC6BHpxGEVgACjxKowfDjWkUXqqDE

EaUJsvsGZxx0G5aFuzGSodixRrGDvoBSJsfhLIURr4m1amQkwbewQ1sLKqa                     

                    



                          82069)                    

5U1wRfsZFuykuUqAtxefxI4KTXtb9OwOQZhy4NjBAUsj5maMM95wHdbamElBNLaoAKhl8OvhV3pPWD9e

Ibvhdqts07rlVGuRN14kCyhlrWmPUDzfYVxAcHxOQryLZOdOHTxSKJsCDZiUTDkHXmbeKAtwJ6wdMA6H

OgrDZxwIFJaEUtlP935LUU2MOHqSOv5XKylaPFgsQ0sxUS8RAj7TTVcMfZe                     

                    



                                                    

PnalAeUnZIwcTTLfTFtosXUta4V0hFwnAQnziuYvMReyZXAosvXFRBs8m5vxCShtuuG5uNAnd3H0sGgp

OUjseoHzUDeiosMsDDHko1ZrDAEbt2YrYIXep1mlOL84kZjobgXbEAIqeSCaz1ZxfN5xIOQ2iRjpbkYj

CXZgYSl2BEzirUPoaM5ggHM7OCo0BXRmFLWoUffoZpWwHBodYMBzXJswuTR                     

                    



                                                    

wg9L2uNsxGWrxyoTvPtveNTXclrUUCzc1s4pkFNVej51jgMOkENtwRMQyqfXtucw8x6oiIPFns71jmIA

lRGimAdWxJLRwSqYaHMByBZTmKNPkFULfDUQoFRILoV9ix5I2x5NeQ092QANpLQFvpTHRMQUOY248YKD

gVFOlXbTvUwXlJXDGG6BWO757BHKmEWGvlLbsBGMmYSGqQEwwER5txQCamJ                     

                    



                                                    

S6rFbuD1QyGnw5oEknBkJyNBdqUVYzeK8tT946HKLxHKeykSpyW2P6ASOxkExfh1YoIAnoQPUxeL3iI6

75WTMaTDohQzZuVqZaHVhdKMyajABpEEZqH057ELGrHXmcycO2nEDpPzCjQyIrTEg8kPZbtAm6AXdkxJ

qvTTI9THN6Nyr4C7u1vSX0SyJgyIw4Fkh5QGU8EXg8BOc1nYM8XABboAc2P                     

                    



                                                    

dmmMSS2LGLpGEn4aNu1UBTfx2TsFWUoURcseNQfIRJnIo7ivDF6sJHvP662QITtRJknrrA1tLJlCfEdT

wKdEmk3OSBuWHH5RVHvATYkVbtpbtDxP1DyNYHkUYsiLs47jM4oJC5hPPLmzw47dSpvuuSvGYGgYHl8R

TbyFLxexhS4EWXbBzPvfmEaOuScvaUcKWFhCYPpYfHyjXGqsz3Qq6Bbv3Jm                     

                    



                                                    

Fl7diTn9g1naqtPsTDZpAFNnyQNhRSv3d7tqsyDnINGkD9Shw34cN030DQDxRqJaZiRpMsEpMVTJbGNi

d5MahJDgW642SYBgZjQarCDGRMDsIBCcGFb0d4mbpwD6ZKKbY2J0EVmPAZazMZDri9IpR640LCAnCrtw

lxDUe18cUK24L951r6ujNNRjegQbsGrCbxpae3cyO261ZIFoaOBccaShVzK                     

                    



                                                    

eCGPjkDXqdOR7YAScES3ppikgTTrqTSnpQVSzsjN3TXLkrMBsE2FqIRPnAB5btwddXDS1PZhyCERqLAV

0ViIfMKBew6Yocfl3LoOrke6iqc20GOM5m2CtoDszMNK2PJK0TmJuBv2rvWPeVWQnQE1iWjQlaYBhVPE

qne93kEbdDOyraiJeiG6nIiPoVCYrwLGyKWBcWL2gcXZuNAXceF9xdqbtUB                     

                    



                                                    

KjDvInbnfdYBIcuQsfnuOtPj3fbPxgASU2GQhqC5neyN9qAeH9MYnlK4kevQ0qMJp0PEfypXX9LZNjqT

5qAX2hwrsqh1kwXDsaJKzqAMTnsdB3rzE9MFAbqMZpC0BxkE3uTCKyLB7giorsk1mwBOY5GLhkBIDhBV

T3NwZzHUCwb5Nzynn4ReXrk9UiiMDoKKrhR65ww005OCYzvlOcX4ddtVDdx                     

                    



                                                    

eukdHJxarykVNopspA0VRThdhXrzCpuyA5yYwPvDuBsFHxuUP8gIGNhK1ytjYUeYOMcHQVyZ5ttHpWdv

A1rmTppTRaqSbAhUdYzGEKZKaXKXUN2fBelKAN5zsRBDUDjHXKHvTU9kXBiLpGwCLOtKBHnhEPvDOBmp

60gQPC3GGTbRJpnFCI5                                         

 

             Specimen     Satisfactory for evaluation                           



             Adequacy                                            



             (test code =                                        



             9847)                                               

 

             TZ/Endocervi Endocervical/transformation zone component present    

                       



             angella (test                                           



             code = 9848)                                        

 

             Diagnosis    Negative for intraepithelial lesion or malignancy     

                      



             (test code =                                        



             9849)                                               

 

             Diagnosis    Partial atrophyTherapy effect                         

  



             Additional                                          



             Statements                                          



             (test code =                                        



             9850)                                               

 

             HPV Reflex   Yes                                    



             for Gyn                                             



             (test code =                                        



             9974)                                               

 

                          Informationa              

s9dsxNXzHQXqgACzIcQdLMWvHBHnp8kyVJAopBWuGwFeNuEcRbKfJykmuDQtHFIdUzRij1ebd974rWIw

t5hdGSXwApW9cVLjEPKslREoH476IMTkIRtbj8bir7WbXEXwzTArw8S9RAZFNEqbAUSSGSz4j4dhVyQr

NkA5lBHjMIcjX7lqkfEglYWkUKBjCNn8tJ74EQOgjG2tdOOdRPhpifYzSuZ                     

                    



                          l Points                  

9GKkxOIAuNzV8WCAbqTFfRFAgW5vaCMEuWMapOJCtTMwrmELjXCO3rOzfa4C7qMNguTGuyGswZwWdAyW

pOsRYy5LcVAy9qHgiK0PeEYJaKkK7xOWaPANpWXyqZISvMRAyphT9wR92RVcfscX8uHIgd4Hsu37hc12

6eT3vfLUqMWT9ARLtPVZspEZyLTRlNOC7YMTknANbW7ptGWZcBZ7voweiYR                     

                    



                          (test code =              

ujNEvsFDZfmYL9AUTsjBAjU3JpFCEnBXtuYNLlmxd5QjKmPk9bzTEoeVwwHEfrs0all9wwlRRuYoe5CH

LyNgRaVhbrMQgxp8Cvf3wbHZTofn3fCYC6jTPiyHmtv8W1bBBzKGUppRXgzmVyXUYeHmE3CEglTG9hen

58OXOtVLO4qm0bdVLtcUqswpFwnFToPOzrD6PgKGWhc844DCFdR9WkNROup                     

                    



                          9836)                     

0O7mzVoShFaAVNvyGC5gpS2TAMbOUo7eARofyS1ucJroBOyL0pkhF9zVVPqVE4cvxssy6oxJHrzSYkxM

YBabTK2jwQ3MAGmqDWhK0VneE0kCJFxMNluXSNdhod1SnRzRl1puENguDciKXekUppuWSyiRXGedsBep

gFitDotYICgLTFfRHpcKVRlBMwtPOTuASGjOkTiaQwzpWbhkU5sJfFpQmGx                     

                    



                                                    

UAnyTA3xORIvR7lpvZUnOOGqCUQqB6zoTyIbcC3aoTjgBMihdsI7QBqrD8Hqsvlhw7IjF3xeYPtmY7x7

z3qlD3kjzSJxPYLgX9YuWB3wxdgjaMZpJ3OujUVwQBT1TwlhU8LswV2gJtTdg5PsahOrHFElmePrSLDw

RLWtWNceAOGhx7DgyQl2RKEpCHWig9DqzVHfEFZbNE7vbpIycmRcbGKslIA                     

                    



                                                    

ct0ryruEgSREdaOomRzRtnR8guMOqEF2hlSUtkYDdz7D3PZciUMAuf29oGFYaDAj8eUEoLDLmsCB0dBA

gssDeIzWmzWIdIQ5zYTQpk2EgTDHnXBLeylQzipAiOCOkARW7u9fmeRszcrN0zYLaVJPrk6KlBS1jXMV

2teBwmfElJ6ultatwSDvpLHZ7NI1tTNLmvrUEi10rTQGyq6DiWBYagU1jeN                     

                    



                                                    

IsAXxstyXgvWJ6JMgvijZbAdMcfcMfJWGphP4xTSHmPN5bNFLusvTgcd1dtiXpDGYvUJKoK1MznpbjsO

ltvoVvZCUquk7qvoUoSBW6JZKLCI8QXFGbCZUed84xLAJmpNyobZ2ofWXdryZhKZXzy3QdvP2xpOAVWN

OdA6vsCV2sAMaaz5RykIKnvJNmvOH4NBMhc8MfDaMadiCbpTBgtKMsW2Qtk                     

                    



                                                    

KurE6qtXOQwMENqkwNpsXQwf2FhCOLswIP2iVBfMQ7CUcYRt49pNCAxQECMetQfOZWvgLxufMA2qlF1x

S5lUwYgaDwiwZ2lTtEfLhGkOcdaTC2eCCEdG3tnjZDqBQAeOLKzZ8wcEvSxsS9zwWtiVvljngBlBAIjb

n0=                                                         



[facility]/18 Gen[facility] and High Risk [id number] 22:35:33





             Test Item    Value        Reference Range Interpretation Comments

 

             HPV Type 16 (test code Negative     Negative,                 



             = 9853)                   Indeterminate,              



                                       Invalid                   

 

             HPV Type 18 (test code Positive     Negative,    A            



             = 9854)                   Indeterminate,              



                                       Invalid                   

 

             HPV High Risk Negative     Negative,                 



             Non-16/18 (test code =              Indeterminate,              



             9855)                     Invalid                   

 

             Informational Points The parminder HPV Test                          

 



             (test code = 9852) ([facility],                           



                          [place], [place]) is a                           



                          qualitative in vitro                           



                          diagnostic test for                           



                          the detection of Human                           



                          Papillomavirus in                           



                          cervical specimens                           



                          collected in                           



                          PreservCyt Solution                           



                          or SurePathTM                           



                          Preservation Fluid.                           



                          The test utilizes                           



                          amplification of                           



                          target DNA by the                           



                          Polymerase Chain                           



                          Reaction (PCR) and                           



                          nucleic acid                           



                          hybridization for the                           



                          detection of 14                           



                          high-risk (HR) HPV                           



                          types in a single                           



                          analysis. The test                           



                          specifically                           



                          identifies types HPV16                           



                          and HPV18 while                           



                          concurrently detecting                           



                          the other high risk                           



                          types (31, 33, 35, 39,                           



                          45, 51, 52, 56, 58,                           



                          59, 66, and 68). The                           



                          performance                            



                          characteristics of                           



                          this test were                           



                          validated and                           



                          determined by the [facility] cytology                           



                          laboratory. These                           



                          validation analyses                           



                          have confirmed the                           



                          accurate performance                           



                          of the assay of the                           



                                                    



                                                            



                          s stated limit of                           



                          detection for the                           



                          target of the test in                           



                          various specimen                           



                          types.  The [facility]                           



                          cytology laboratory is                           



                          authorized under                           



                          Clinical Laboratory                           



                          Improvement Amendments                           



                          (CLIA) to perform                           



                          high-complexity                           



                          testing. The [facility]                           



                          Department of                           



                          Pathology is                           



                          accredited by the                           



                          [employer]                           



                          Pathologists (CAP).                           

 

             Lab Interpretation Abnormal                               



             (test code = 67282-4)                                        



MD BlackwellPEELIZABETH Subsequent Treatment [id number] 16:49:51No activity in 
the uterine cervix or FDG avid laura metastases from treated disease. Fibroid 
uterus.New focal activity in the uterus from a degenerating fibroid. Stable 
subcentimeter right breast nodule since CT chest 10/28/2020 from possible 
intramammary node, however can be further evaluated with mammography if not 
already performed.   Interface, Radiology Results In - 2021 11:52 AM 
CDTFormatting of this note might be different from the original.FULL 
RESULT:Examination: FDG PET/CT, 2021 9:41 AMClinical History: 50-year-old 
female with malignant neoplasm of the endometrium and uterine cervixIndication: 
Restaging, subsequent treatment strategyComparison: PET/CT 2021Technique: 
F-18fluorodeoxyglucose (FDG) 10 mCi was administered intravenously via right 
antecubital vein.  To allowfor distribution and uptake of radiotracer, the 
patient was asked to rest quietly for approximately 60-90 minutes.  PET/CT 
imaging was performed from the skull to the proximal thighs. Serum blood glucose
at the time of the injection was 106 mg/dL. CT scanning was done for attenuation
correction, imageregistration, and diagnosis with scan parameters optimized to 
minimize radiation exposure to the patient. SUV measurements are reported as 
maximum SUV based on body weight unless otherwise specified.Findings: Head and 
Neck: Physiologic radiotracer activity is noted in the brain. The paranasal 
sinuses are clear. No enlarged or hypermetabolic cervical lymph nodes are 
identified.Heterogeneous thyroid gland from underlying nodules of which the 
dominant nodule was biopsied in the inferior right lobe on 2020 revealing 
colloid nodule with cystic degeneration.Chest: Stable indeterminate non-FDG avid
subcentimeter lung nodules in the right upper lobe series 4 image 57, left upper
lobe image 74 and right middle lobe image 86 measuring up to 0.5 cm compared to 
CT chest 10/28/2020.There are no enlarged or hypermetabolic mediastinal, hilar 
or axillary lymph nodes.A stable non-FDG avid 0.6 cm right breast nodule on 
series 3 image 108 is unchanged from CT chest 10/28/2020. Abdomen and Pelvis: 
The unenhanced spleen, gallbladder, liver, pancreas, adrenal glands and kidneys 
are normal. No hydronephrosis.Similar tiny left common iliac lymph node on 
series 3 image 181 without associated activity from treated disease. There are 
no new enlarged or hypermetabolic abdominal or pelvic lymph nodes. No focal 
hypermetabolism in the uterine cervix. Fibroid uterus. New focal activity on 
image 212 with an SUV of 5.6localizing to an anterior mid body intramural 
fibroid, better seen on the prior contrast-enhanced examination.Musculoskeletal:
No suspicious osseous lesions. Again seen is activity close to the femoral
trochanters from inflammation.IMPRESSION:No activity in the uterine cervix or 
FDG avid laura metastases from treated disease.Fibroid uterus. New focal 
activity in the uterus from a degenerating fibroid.Stable subcentimeter right 
breast nodule since CT chest 10/28/2020 from possible intramammary node, however
can be further evaluated with mammography if not already performed.MD Blackwell
POC Glucose [id number] 13:28:12





             Test Item    Value        Reference Range Interpretation Comments

 

             POC Glucose (test 106 mg/dL    70-99        H            Capillary 

blood



             code = 13817-5)                                        samples, e.g

.



                                                                 obtained by



                                                                 fingerstick, ma

y have



                                                                 inaccurate resu

lts in



                                                                 patients with



                                                                 decreased perip

heral



                                                                 blood flow. Met

hod



                                                                 description: Al

l



                                                                 results are malinda

sured



                                                                 using



                                                                 Electrochemistr

y test



                                                                 methodology. Th

e



                                                                 glucose in the 

sample



                                                                 mixes with the



                                                                 reagents on the

 test



                                                                 strip. The reac

tion



                                                                 produces an shira

ctric



                                                                 current. The am

ount



                                                                 of current prod

uced



                                                                 is proportional

 to



                                                                 the glucose



                                                                 concentration i

n the



                                                                 blood.

 

             PO Sample Type (test Venous                                 



             code = 9554)                                        

 

             Performing Lab (test [facility]



             code = 83675) [facility]



                                                                 [facility] ,[address], 

[place]



                                                                 [place] [address],

 [place]



                                                                 of Care Lab Dir

qian:



                                                                 Tracey Malhotra MD

 

             Lab Interpretation Abnormal                               



             (test code =                                        



             48968-4)                                            



MD BlackwellPETCT Contrast Enhanced Subsequent Treatment [id number] 
16:28:491. Interval decrease in F-18 FDG activity associated with the primary 
malignancy within the cervix and left paracervical region, likely representing 
treated disease 2. Interval decrease in size and resolution of hypermetabolism 
associated with a left common iliac lymph node.  3. No new or distant sites of 
disease.  I personally reviewed these image(s) along with the 
resident's/fellow's interpretations, certify that if a procedure was performed I
was physically present, and agree with the final report.Interface, Radiology 
Results In - 2021 11:30 AM CDTFormatting of this note might be different
from the original.FULL RESULT:Examination:  Contrast-Enhanced F-18 FDG PET/CT, 
2021 9:06 AMClinical History: Squamous cell carcinoma of the 
cervixIndication: Restaging for subsequent treatment planComparison: PET/CT 
2020, MRI pelvis 3/4/2021Technique:  F-18 fluorodeoxyglucose (FDG) 8.1 mCi
was administered intravenously via a left antecubital fossa vein.  To allow for 
distribution and uptake of radiotracer, the patient was asked to rest quietly 
for approximately 60-90 minutes.  PET/CT imaging was performed from the skull 
base to mid thighs. Serum blood glucose at the time of the injection was 101 
mg/dL. CT scanning was done for attenuation correction, image registration, and 
diagnosis with scan parameters optimized to minimize radiation exposure to the 
patient. The CT portion of the examination was performed with intravenous and 
enteric contrast. SUV measurements are reported as maximum SUV based on body 
weight unless otherwise specified. Findings: Head and Neck: No F-18 FDG avid 
lesions within the brain parenchyma. The paranasal sinuses are well aerated. No 
cervical or supraclavicular lymphadenopathy. Stable mildly enlarged right 
thyroid gland with multiple non-F-18 FDG nodules, most likely multinodular 
goiter.Chest: No F-18 FDG avid mediastinal, hilar or axillary lymphadenopathy.No
F-18 FDG avid pulmonary nodularity. Stable non-F-18 FDG avid 5 mm pulmonary 
nodule in the right upper lobe (image 197). No pleural or pericardial 
effusion.Stable non-F-18 FDG avid 5 mm right breast nodule (image 169).Abdomen 
and Pelvis: No F-18 FDG avid lesions in the liver, gallbladder, spleen, pan
creas, adrenal glands, or kidneys. Physiologic gastrointestinal activity. No F-
18 FDG avid abdominal, retroperitoneal lymphadenopathy. There is been an 
interval decrease in size and F-18 FDG avidity ofa left common iliac lymph node,
which measures now approximately 0.7 x 0.4 cm with no significant radiotracer 
activity above background (image 302). Previously, this measured 1.4 x 1.1 cm 
with a maximum SUV of 4.5.There is interval decrease in F-18 FDG activity 
associated with a previously seen hypermetabolic mass in the cervix and left 
paracervical region (SUV max 4.8, previously 13.6). Enlarged uterus with 
multiple fibroids, unchanged. Non mass-like activity at the vaginal introitus 
likely represents excreted urinary activity.Musculoskeletal: No F-18 FDG FDG 
avid osseous lesions.IMPRESSION:1. Interval decrease in F-18 FDG activity 
associated with the primary malignancy within the cervix and leftparacervical 
region, likely representing treated disease2. Interval decrease in size and 
resolution of hypermetabolism associated with a left common iliac lymph node. 3.
No new or distant sites of disease.I personally reviewed these image(s) along 
with the resident's/fellow's interpretations, certify that if a procedure was 
performed I was physically present, and agree with the final report.MD Blackwell
POC [id number] 12:30:22





             Test Item    Value        Reference Range Interpretation Comments

 

             POC Crea (test code 0.8 mg/dL    0.6-1.3                   Medicati

ons, especially



             = 23294-2)                                          hydroxyurea or



                                                                 supplements, rosenbaum

ch as



                                                                 ascorbate, can 

interfere



                                                                 with test resul

ts



                                                                 causing a false

ly and



                                                                 significantly h

igher



                                                                 result than exp

ected. If



                                                                 a problem is rosenbaum

spected



                                                                 with a patient'

s result,



                                                                 a sample should

 be sent



                                                                 to the [facility] for



                                                                 confirmatory te

sting.



                                                                 Method descript

ion: The



                                                                 i-STAT is an an

alyzer



                                                                 used for in vit

ro



                                                                 quantification 

of



                                                                 various analyte

s in



                                                                 whole blood. Th

e device



                                                                 uses a single d

isposable



                                                                 cartridge which

 contains



                                                                 microfabricated

 sensors,



                                                                 a calibration s

olution,



                                                                 fluidics system

, and a



                                                                 waste chamber. 

Each test



                                                                 cartridge conta

ins



                                                                 chemically sens

itive



                                                                 biosensors on a

 silicon



                                                                 chip that are c

onfigured



                                                                 to perform spec

ific



                                                                 tests. The



                                                                 microfabricated

 sensors



                                                                 measure analyte



                                                                 concentration b

y an



                                                                 electrochemical

 assay.

 

             POC eGFR-AA (test 101          See_Comment               Normal eGF

R >= 60



             code = 93513-3)                                        mL/min/1.73 

m2 The eGFR



                                                                 is calculated u

sing the



                                                                 CKD-EPI equatio

n. The



                                                                 eGFR declines w

ith age.



                                                                 eGFR <60 mL/min

/1.73 m2



                                                                 is considered a

s



                                                                 "decreased" Thi

s



                                                                 equation should

 only be



                                                                 used for patien

ts 18 and



                                                                 older. Accordin

g to the



                                                                 National Kidney



                                                                 Foundation's Ki

dney



                                                                 Disease Outcome

 Quality



                                                                 Initiative (KDO

QI)



                                                                 classification 

and 2012



                                                                 Kidney Disease 

Improving



                                                                 Global Outcomes

 (KDIGO)



                                                                 Clinical Practi

ce



                                                                 Guideline, the 

stage of



                                                                 CKD should be



                                                                 categorized bas

ed on



                                                                 estimated GFR. 

Stage



                                                                 Description GFR



                                                                 mL/min/1.73 m21

 Kidney



                                                                 damage with nor

mal or



                                                                 high GFR     >=

902



                                                                 Kidney damage w

ith mild



                                                                 decrease in GFR



                                                                 60-893a Mild to

 moderate



                                                                 decrease in GFR



                                                                 45-593b Moderat

e to



                                                                 severe decrease

 in GFR



                                                                    Severe

 decrease



                                                                 in GFR    15-29

5 Kidney



                                                                 failure     <15

 (or



                                                                 dialysis)  [Aut

omated



                                                                 message] The sy

stem



                                                                 which generated

 this



                                                                 result transmit

kristina



                                                                 reference range

: >=60



                                                                 mL/min/1.73 m2.

 The



                                                                 reference range

 was not



                                                                 used to interpr

et this



                                                                 result as



                                                                 normal/abnormal

.

 

             POC eGFR-ZORAIDA (test 87           See_Comment               Normal eG

FR >= 60



             code = 69154-3)                                        mL/min/1.73 

m2 The eGFR



                                                                 is calculated u

sing the



                                                                 CKD-EPI equatio

n. The



                                                                 eGFR declines w

ith age.



                                                                 eGFR <60 mL/min

/1.73 m2



                                                                 is considered a

s



                                                                 "decreased" Thi

s



                                                                 equation should

 only be



                                                                 used for patien

ts 18 and



                                                                 older. Nathanin

g to the



                                                                 National Kidney



                                                                 Foundation's Ki

dney



                                                                 Disease Outcome

 Quality



                                                                 Initiative (KDO

QI)



                                                                 classification 

and 2012



                                                                 Kidney Disease 

Improving



                                                                 Global Outcomes

 (KDIGO)



                                                                 Clinical Practi

ce



                                                                 Guideline, the 

stage of



                                                                 CKD should be



                                                                 categorized bas

ed on



                                                                 estimated GFR. 

Stage



                                                                 Description GFR



                                                                 mL/min/1.73 m21

 Kidney



                                                                 damage with nor

mal or



                                                                 high GFR     >=

902



                                                                 Kidney damage w

ith mild



                                                                 decrease in GFR



                                                                 60-893a Mild to

 moderate



                                                                 decrease in GFR



                                                                 45-593b Moderat

e to



                                                                 severe decrease

 in GFR



                                                                    Severe

 decrease



                                                                 in GFR    15-29

5 Kidney



                                                                 failure     <15

 (or



                                                                 dialysis)  [Aut

omated



                                                                 message] The sy

stem



                                                                 which generated

 this



                                                                 result transmit

kristina



                                                                 reference range

: >=60



                                                                 mL/min/1.73 m2.

 The



                                                                 reference range

 was not



                                                                 used to interpr

et this



                                                                 result as



                                                                 normal/abnormal

.

 

             POC Clean Dev (test Yes                                    



             code = 6672)                                        

 

             Performing Lab (test [facility]                                Diagnos

[facility]



             code = 43668)                                        [facility]

stic



                                                                 [facility] [place],



                                                                 [id number] [address]

[facility],



                                                                 [place], 

[address]; [place]



                                                                 of Care Lab Dir

qian:



                                                                 MD MD Rishi FraustoMRI Pelvis with and without Contrast - [id number] 
13:03:42No evidence of metastatic involvement of the skeletal pelvis.Significant
bilateral gluteus medius enthesopathy and low-grade tear that is most 
significant on the left side. Interface, Radiology ResultsIn - 2021  8:05 
AM CDTFormatting of this note might be different from the original.FULL RESULT:
Examination: MRI PELVIS W WO CONTRAST - MUSCULOSKELETAL, 2021 6:17 
PM.Clinical History: A 50-year-old patient with cervix carcinoma on treatment 
with CISplatin with Radiation since 2020Indication: Hip pain, Hip 
painComparison: MRI or 2021Technique: Multiplanar multisequence magnetic 
resonance imaging of the pelvis was performed without and with intravenous 
administration of contrast.Findings: Bones:*  No focal bone lesion or fracture 
detected.Nodes:*  No pelvic or inguinal lymphadenopathy demonstratedVisceral 
pelvis:*  Multiple uterine fibroids. No discernible enhancing or diffusion
restricting cervical mass. Small amount of fluid within the pelvic 
cul-de-sac.Skeletal pelvis:*  Bilateral increased T2 signal adjacent to the 
greater trochanters indicative of gluteus medius enthesopathy and low-grade tear
most significant on the right and left side (series 4 image 23 and series 8 hussein
ge 30).*  Small subcortical synovial cyst pit within the anterior right femoral 
neck (series 8 image28). No evidence of AVN IMPRESSION:No evidence of metastatic
involvement of the skeletal pelvis.Significant bilateral gluteus medius 
enthesopathy and low-grade tear that is most significant on the leftside.MD BlackwellXR HIP 2 VW BILATERAL W [id number] 20:57:58Possible minimal 
degenerative changes at the left hip.   Interface, Radiology Results In - 
2021  4:00 PM CDTFormatting of this note might be different from the 
original.FULL RESULT:Examination: XR HIP 2 VW BILATERAL W PELVIS, 2021 
at 2:12 PMClinical History: Pain in unspecified hipIndication: Pain in 
unspecified hipComparison: Images through the pelvis and hips from PET/CT of 
2020 Technique: AP view of the pelvis and coned AP and frog-leg 
lateral views of both hipsFindings: A tiny calcification at the lateral margin 
of the left acetabulum is of uncertain significance. Itcould represent a small 
dystrophic calcification in soft tissues. Small spurs also present and could
indicate otherwise inapparent degenerative change. No other bony abnormality is 
noted. Hip joints and sacroiliac joints are maintained in 
width.IMPRESSION:Possible minimal degenerative changes at the left hip.MD BlackwellJAY Pelvis with and without Contrast - [id number] 16:36:511.  The 
site of known residual disease in the cervical region and left parametrial 
nodule are difficult to clearly visualize but the appearances remain unchanged 
since 2020 and the significant interval improvement compared to MRI from 
10/30/2020 is maintained.2.  Residual subcentimeter left common iliac node 
remains stable since prior study, decreased compared to 10/30/2020. Interface, 
Radiology Results In - 2021 10:38 AM CSTFormatting of this note might be 
different from the original.FULL RESULT:Examination: MRI PELVIS W WO CONTRAST - 
GYN on 3/4/2021 7:18 AMClinical History: Malignant neoplasm of overlapping sites
of cervix uteriIndication: Determine extent of tumor/ disease, vascularinvasion,
tumor encapsulation, satellite nodules, multi-focal disease, therapy 
responseComparison: None.Technique: MRI pelvis with and without intravenous 
contrastFindings: The site of known residual disease has further decreased in 
size and is difficult to clearly visualize on the current examination. There is 
only some nodular thickening noted in this region [for example series 5, image 
13]. Similarly the 7 mm residual nodularity noted in the site of known left 
paratracheal nodule appears stable since prior study and decreased compared to 
initial MRI from 10/30/2020. As seen before, questionable minimal restricted 
diffusion is seen in this region [series 1050, image 84].Thickened junctional 
zoneis again noted in the uterus, likely secondary to adenomyomatosis. Multiple 
fibroids are again noted, with the largest measuring 3.3 cm in the anterior 
fundus. Small bilateral ovarian cysts are again seen.Stable small volume pelvic 
nodes. For example a 9 mm left common iliac node [series 9, image 9] and a 5 x 
10 mm right common iliac node and a 3 mm left external iliac node [image 23] 
remain stable since prior study.IMPRESSION:1.  The site of known residual 
disease in the cervical region and left parametrial nodule are difficult to 
clearly visualize but the appearances remain unchanged since 2020 and the 
significant interval improvement compared to MRI from 10/30/2020 is 
maintained.2.  Residual subcentimeter left common iliac node remains stable 
since prior study, decreased compared to 10/30/2020.MD BlackwellCOVID-19 
(SARS-CoV-2) PCR-Asymptomatic [id number] 22:18:59





             Test Item    Value        Reference Range Interpretation Comments

 

             COVID19 (SARS Not Detected Not Detected              This test is a



             CoV-2) Result                                        qualitative



             (test code =                                        reverse-transcr

iptase



             38252-0)                                            polymerase lester

n



                                                                 reaction (RT-PC

R)



                                                                 developed for t

he



                                                                 Roche PARMNIDER 680

0



                                                                 system and inte

nded



                                                                 for the detecti

on of



                                                                 SARS CoV-2 RNA 

in



                                                                 human nasophary

ngeal



                                                                 specimens from



                                                                 patients who me

et



                                                                 COVID-19 clinic

al



                                                                 and/or epidemio

logical



                                                                 criteria. This 

assay



                                                                 has been approv

ed by



                                                                 the FDA for use

 only



                                                                 under Emergency

 Use



                                                                 Authorization (

EUA) in



                                                                 laboratories th

at have



                                                                 been CLIA-certi

fied to



                                                                 perform



                                                                 moderate-comple

xity



                                                                 and high-comple

xity



                                                                 tests. The perf

ormance



                                                                 characteristics

 of



                                                                 this assay were



                                                                 verified by the



                                                                 Microbiology



                                                                 Laboratory at [facility]



                                                                 [place], CLIA



                                                                 Accreditation #

:



                                                                 [id number] and 

CAP



                                                                 Accreditation #

:



                                                                 [id number]. Result

s must



                                                                 be interpreted 

within



                                                                 the context of 

all



                                                                 relevant clinic

al and



                                                                 laboratory find

ings



                                                                 and should not 

form



                                                                 the sole basis 

for a



                                                                 diagnosis or tr

eatment



                                                                 decision. "Pres

umptive



                                                                 Positive" resul

ts are



                                                                 due to partial



                                                                 amplification o

f



                                                                 SARS-CoV-2 targ

ets and



                                                                 indicates low a

rick



                                                                 of virus presen

t in



                                                                 the specimen at

 or



                                                                 near the limit 

of



                                                                 detection. Rega

rdless,



                                                                 individuals wit

h



                                                                 "Presumptive Po

sitive"



                                                                 results should 

be



                                                                 managed per



                                                                 institutional



                                                                 guidelines as



                                                                 individuals pos

itive



                                                                 for SARS-CoV-2 

virus,



                                                                 including use o

f



                                                                 appropriate inf

ection



                                                                 control protoco

ls.



                                                                 Internal contro

ls are



                                                                 included to ass

ess for



                                                                 possible amplif

ication



                                                                 inhibitors. If



                                                                 inhibition is



                                                                 detected, testi

ng is



                                                                 repeated and if



                                                                 inhibition is



                                                                 confirmed the s

pecimen



                                                                 is resulted as



                                                                 "Invalid". When

 an



                                                                 "Invalid" resul

t



                                                                 occur, it is



                                                                 recommended to 

wait 3



                                                                 days before sub

mitting



                                                                 a new specimen 

for



                                                                 testing if clin

ically



                                                                 indicated.

 

             COVID19 SARS NP Swab                                



             Source (test code                                        



             = 82308)                                            

 

             COVID19 SARS Pre-Radiation                           



             Indication (test Therapy                                



             code = 05962)                                        



[facility] COVID-19 (PRASHANTH-CoV-2) PCR [id number] 14:41:41





             Test Item    Value        Reference    Interpretation Comments



                                       Range                     

 

             COVID19 SARS Inpatient Admission                           



             Indication (test                                        



             code = 44976)                                        

 

             COVID19 SARS Result Not Detected Not Detected              



             (test code =                                        



             37091-3)                                            

 

             COVID19 SARS SARS-CoV-2 NOT Detected.                           



             Interpretation (test Reference Range: Not                          

 



             code = 58805) Detected Methodology: The                           



                          [employer] RealTime SARS-CoV-2                           



                          assay is a qualitative                           



                          real-time reverse                           



                          transcription polymerase                           



                          chain reaction (rRT-PCR)                           



                          test to detect RNA from                           



                          SARS-CoV-2 in nasal,                           



                          nasopharyngeal and                           



                          oropharyngeal swabs from                           



                          patients with signs and                           



                          symptoms of infection who                           



                          are suspected of COVID-19                           



                          by their health care                           



                          provider. The [employer]                           



                          RealTime SARS-CoV-2                           



                          performed on the [employer]                           



                          m2000 System is a dual                           



                          target assay with primers                           



                          and probes for the RdRp and                           



                          N genes. Results must be                           



                          interpreted within the                           



                          context of all relevant                           



                          clinical and laboratory                           



                          findings, and                           



                          epidemiological risk                           



                          factors. Positive results                           



                          are indicative of the                           



                          presence of SARS-CoV-2 RNA;                           



                          clinical correlation with                           



                          patient history and other                           



                          diagnostic information is                           



                          necessary to determine                           



                          patient infection status.                           



                          Positive results do not                           



                          rule out bacterial                           



                          infection or co-infection                           



                          with other viruses.                           



                          Negative results do not                           



                          preclude SARS-CoV-2                           



                          infection and should not be                           



                          used as the sole basis for                           



                          patient management                           



                          decisions. The [employer]                           



                          RealTime SARS-CoV-2 assay                           



                          is for in vitro diagnostic                           



                          use under FDA Emergency Use                           



                          Authorization only. Testing                           



                          is limited to laboratories                           



                          certified under the                           



                          Clinical Laboratory                           



                          Improvement Amendments of                           



                          1988 (CLIA), 42U.S.C.                           



                          263a, to perform high                           



                          complexity tests. The Test                           



                          was performed by the                           



                          CLIA-certified,                           



                          high-complexity Molecular                           



                          Diagnostics Laboratory                           



                          (MDL) at [facility] under the Food and                           



                                                    Drug Administration (FDA)



                                                            



                          s Emergency Use                           



                          Authorization. Factsheet                           



                          for patients:                           



                          https://www.[employer].org/                           



                          [employer]FactSheetPatientsFact                           



                          sheet for healthcare                           



                          providers:                             



                          https://www.[employer].org/                           



                          AbbottFactSheetHCP Test                           



                          performed by:[facility]                           



                          [facility]                           



                          [id number] [place] [address]                           



MD BlackwellGlomerular Filtration [id number] 18:19:19





             Test Item    Value        Reference Range Interpretation Comments

 

             eGFR-AA (test code 123          See_Comment               Normal eG

FR >= 60 mL/min/1.73



             = 8062)                                             m2 Note: The eG

FR is



                                                                 calculated usin

g the CKD-EPI



                                                                 equation. The e

GFR declines



                                                                 with age. eGFR 

<60



                                                                 mL/min/1.73 m2 

is considered



                                                                 as "decreased".

 This equation



                                                                 should only be 

used for



                                                                 patients 18 and

 older.



                                                                 According to 

e National



                                                                 Kidney Foundati

on's Kidney



                                                                 Disease Outcome

 Quality



                                                                 Initiative (KDO

QI)



                                                                 classification 

and 2012



                                                                 Kidney Disease 

Improving



                                                                 Global Outcomes

 (KDIGO)



                                                                 Clinical Practi

ce Guideline,



                                                                 the stage of CK

D should be



                                                                 categorized bas

ed on



                                                                 estimated GFR. 

Stage



                                                                 Description    

   GFR



                                                                 mL/min/1.73 m21

 Normal or



                                                                 high GFR       

    >=902



                                                                 Mildly decrease

d GFR



                                                                       60-893a M

ildly to



                                                                 moderately decr

eased GFR



                                                                 45-593b Moderat

ely to



                                                                 severely decrea

sed GFR



                                                                  Severely

 decreased GFR



                                                                       Kid

kathy failure



                                                                    <15  Testing

 Performed at



                                                                 [facility]



                                                                 [address],



                                                                 [place], 

30  [Automated



                                                                 message] The sy

stem which



                                                                 generated this 

result



                                                                 transmitted ref

erence range:



                                                                 >=60 mL/min/1.7

3 sq. m. The



                                                                 reference range

 was not used



                                                                 to interpret 

is result as



                                                                 normal/abnormal

.

 

             eGFR-ZORAIDA (test code 107          See_Comment               Normal e

GFR >= 60 mL/min/1.73



             = 8063)                                             m2 Note: The eG

FR is



                                                                 calculated usin

g the CKD-EPI



                                                                 equation. The e

GFR declines



                                                                 with age. eGFR 

<60



                                                                 mL/min/1.73 m2 

is considered



                                                                 as "decreased".

 This equation



                                                                 should only be 

used for



                                                                 patients 18 and

 older.



                                                                 According to 

e National



                                                                 Kidney Foundati

on's Kidney



                                                                 Disease Outcome

 Quality



                                                                 Initiative (KDO

QI)



                                                                 classification 

and 2012



                                                                 Kidney Disease 

Improving



                                                                 Global Outcomes

 (KDIGO)



                                                                 Clinical Practi

ce Guideline,



                                                                 the stage of CK

D should be



                                                                 categorized bas

ed on



                                                                 estimated GFR. 

Stage



                                                                 Description    

   GFR



                                                                 mL/min/1.73 m21

 Normal or



                                                                 high GFR       

    >=902



                                                                 Mildly decrease

d GFR



                                                                       60-893a M

ildly to



                                                                 moderately decr

eased GFR



                                                                 45-593b Moderat

ely to



                                                                 severely decrea

sed GFR



                                                                  Severely

 decreased GFR



                                                                       Kid

kathy failure



                                                                    <15 Testing 

Performed at



                                                                 [facility],



                                                                 [address] [place] [address] [address],



                                                                 [place], 

30  [Automated



                                                                 message] The sy

stem which



                                                                 generated this 

result



                                                                 transmitted ref

erence range:



                                                                 >=60 mL/min/1.7

3 sq. m. The



                                                                 reference range

 was not used



                                                                 to interpret th

is result as



                                                                 normal/abnormal

.



MD B[id number] 18:19:18





             Test Item    Value        Reference Range Interpretation Comments

 

             Magnesium (test code = 1.8 mg/dL    1.6-2.6                   Testi

ng Performed at



             6359)                                               [facility], [address] [place] [address], 

[address]



                                                                 [address], [place], [place]

X [address]



MD BlackwellCalcium [id number] 18:19:17





             Test Item    Value        Reference Range Interpretation Comments

 

             Calcium Lvl (test 9.3 mg/dL    8.4-10.2                  Testing Pe

rformed at



             code = 5258)                                        [facility], [address]



                                                                 [address],



                                                                 [place], 

[address]



MD BlackwellBilirubin, [id number] 18:19:16





             Test Item    Value        Reference Range Interpretation Comments

 

             Bili Total (test 0.4 mg/dL    See_Comment               Indocyanine

 Green (ICG)



             code = 5096)                                        may cause false

ly elevated



                                                                 bilirubin resul

ts. Total



                                                                 and direct bili

bernal must



                                                                 not be measured

 from



                                                                 samples contain

ing



                                                                 indocyanine gre

en. False



                                                                 elevation of to

nicholas



                                                                 bilirubin can b

e seen in



                                                                 patients with I

gG



                                                                 concentrations 

above 28



                                                                 g/L.Testing Per

formed at



                                                                 [facility], [address],



                                                                 [address], [facility], [place]



                                                                 [id number]  [Automat

ed message]



                                                                 The system [employer] generated



                                                                 this result tra

nsmitted



                                                                 reference range

: <=1.2.



                                                                 The reference r

varghese was



                                                                 not used to int

erpret this



                                                                 result as nicole

l/abnormal.



MD [id number] 18:19:15





             Test Item    Value        Reference Range Interpretation Comments

 

             AST (test code = 26 U/L       See_Comment               Testing Per

formed at [facility]



             4731)                                               Lab Ambulatory 

[facility],



                                                                 [address], [address]



                                                                 [address], [place], [place]

[place] [address]



                                                                 [Automated mess

age] The



                                                                 system which ge

nerated this



                                                                 result transmit

kristina



                                                                 reference range

: <=32. The



                                                                 reference range

 was not



                                                                 used to interpr

et this



                                                                 result as nicole

l/abnormal.



MD [id number] 18:19:14





             Test Item    Value        Reference Range Interpretation Comments

 

             ALT (test code = 4705) 40 U/L       See_Comment  H            Testi

ng Performed at



                                                                 [facility]

ory



                                                                 [facility], [address] 

[address]



                                                                 [address], [place], [place]

[place]



                                                                 [id number] [Automate

d



                                                                 message] The sy

stem



                                                                 which generated

 this



                                                                 result transmit

kristina



                                                                 reference range

:



                                                                 <=33. The refer

ence



                                                                 range was not u

sed to



                                                                 interpret this 

result



                                                                 as normal/abnor

mal.

 

             Lab Interpretation (test Abnormal                               



             code = 88719-4)                                        



MD BlackwellElectrolyte [id number] 18:19:13





             Test Item    Value        Reference Range Interpretation Comments

 

             Sodium Lvl (test code = 140          See_Comment               Test

ing Performed at [facility]



             7344)                                               Lab Ambulatory 

[facility],



                                                                 [address]



                                                                 [address], [place], [place]

X [address]



                                                                 [Automated mess

age] The



                                                                 system which ge

nerated



                                                                 this result tra

nsmitted



                                                                 reference range

: 136 -



                                                                 145 mEq/L. The 

reference



                                                                 range was not u

sed to



                                                                 interpret this 

result as



                                                                 normal/abnormal

.

 

             Potassium Lvl (test 3.9          See_Comment               Testing 

Performed at [facility]



             code = 6854)                                        Lab Ambulatory 

[facility],



                                                                 [address]



                                                                 [address], [place], [place]

[place] [address]



                                                                 [Automated mess

age] The



                                                                 system which ge

nerated



                                                                 this result tra

nsmitted



                                                                 reference range

: 3.5 -



                                                                 5.1 mEq/L. The 

reference



                                                                 range was not u

sed to



                                                                 interpret this 

result as



                                                                 normal/abnormal

.

 

             Chloride (test code = 104          See_Comment               Testin

g Performed at [facility]



             5278)                                               Lab Ambulatory 

[facility],



                                                                 [address], [address]



                                                                 [address], [place], [place]

[place] [address]



                                                                 [Automated mess

age] The



                                                                 system which ge

nerated



                                                                 this result tra

nsmitted



                                                                 reference range

: 98 - 107



                                                                 mEq/L. The refe

rence



                                                                 range was not u

sed to



                                                                 interpret this 

result as



                                                                 normal/abnormal

.

 

             CO2 (test code = 5227) 25           See_Comment               Testi

ng Performed at [facility]



                                                                 Lab Ambulatory 

[facility],



                                                                 [address], [address]



                                                                 [address], [place], [place]

[place] [address]



                                                                 [Automated mess

age] The



                                                                 system which ge

nerated



                                                                 this result tra

nsmitted



                                                                 reference range

: 22 - 29



                                                                 mEq/L. The refe

rence



                                                                 range was not u

sed to



                                                                 interpret this 

result as



                                                                 normal/abnormal

.

 

             Anion Gap (test code = 11           See_Comment               Testi

ng Performed at [facility]



             9325)                                               Lab Ambulatory 

[facility],



                                                                 [address]



                                                                 [address], [place], [place]

[place] [address]



                                                                 [Automated mess

age] The



                                                                 system which ge

nerated



                                                                 this result tra

nsmitted



                                                                 reference range

: 4 - 14



                                                                 mEq/L. The refe

rence



                                                                 range was not u

sed to



                                                                 interpret this 

result as



                                                                 normal/abnormal

.



MD Blackwell.Serum [id number] 18:19:11





             Test Item    Value        Reference Range Interpretation Comments

 

             Creatinine (test code 0.62 mg/dL   0.51-0.95                 Testin

g Performed at



             = 5399)                                             [facility], [address] 

[address]



                                                                 [address], [place], [place]

[place]



                                                                 [address]



MD [id number] 18:19:10





             Test Item    Value        Reference Range Interpretation Comments

 

             BUN (test code = 6 mg/dL      6-23                      Testing Per

formed at [facility]



             5055)                                               [facility],



                                                                 [facility], [address]



                                                                 [address], [place], [place]

[place] [address]



MD BlackwellGlucose [id number] 18:19:09





             Test Item    Value        Reference Range Interpretation Comments

 

             Glucose Level (test code 176 mg/dL    70-99        H            Ref

erence range is



             = 5699)                                             valid for fasti

ng



                                                                 specimens only.



                                                                 Guidelines



                                                                 established by 

the



                                                                 American Diabet

es



                                                                 Association



                                                                 guidelines



                                                                 (Standards of



                                                                 Medical Care in



                                                                 Diabetes 2016.



                                                                 Diabetes Care 2

016;



                                                                 39: S13-22) are

 that



                                                                 a fasting gluco

se of



                                                                 greater than or



                                                                 equal to 126 mg

/dL



                                                                 or a random glu

cose



                                                                 greater than or



                                                                 equal to 200 mg

/dL



                                                                 with symptoms, 

that



                                                                 are confirmed b

y



                                                                 repeat testing 

on a



                                                                 different day, 

meet



                                                                 the criteria fo

r



                                                                 diabetes melltyrell

us.



                                                                 Testing Perform

ed at



                                                                 [facility], [address]



                                                                 [facility], 

[address]



                                                                 [address], [place], 

[place]



                                                                 [address]

 

             Lab Interpretation (test Abnormal                               



             code = 23693-0)                                        



MD Bla[id number] 17:33:16





             Test Item    Value        Reference Range Interpretation Comments

 

             Neutrophil % (test 84.4 %       42.0-66.0    H            As part o

f



             code = 6491)                                        Differential



                                                                 performed at [facility], [address]



                                                                 [facility],



                                                                 [address],



                                                                 [place], [place] [address]

[address]

 

             Lymphocyte % (test 10.9 %       24.0-44.0    L            



             code = 6194)                                        

 

             Monocyte % (test code 3.1 %        2.0-7.0                   



             = 6422)                                             

 

             Eosinophil % (test 0.8 %        1.0-4.0      L            



             code = 5520)                                        

 

             Basophil % (test code 0.0 %        0.0-1.0                   



             = 5068)                                             

 

             IGRE % (test code = 0.8 %        0.0-0.4      H            IGRE % c

ount



             5958)                                               includes



                                                                 Metamyelocytes,



                                                                 Myelocytes, and



                                                                 Promyelocytes. 

As



                                                                 part of



                                                                 Differential



                                                                 performed at [facility], [address]



                                                                 [facility],



                                                                 [address],



                                                                 [place], [place] [address]

[address]

 

             Neutrophil Abs (test 1.09 K/uL    1.70-7.30    L            



             code = 6492)                                        

 

             Lymphocyte Abs (test 0.14 K/uL    1.00-4.80    L            



             code = 6195)                                        

 

             Monocyte Abs (test 0.04 K/uL    0.08-0.70    L            



             code = 6423)                                        

 

             Eosinophil Abs (test 0.01 K/uL    0.04-0.40    L            



             code = 5521)                                        

 

             Basophil Abs (test 0.00 K/uL    0.00-0.10                 



             code = 5069)                                        

 

             IG Abs (test code = 0.01 K/uL    0.00-0.04                 



             5954)                                               

 

             PRABHJOT (test code = PRABHJOT) Please schedule                           



                          on  when                           



                          patient is here                           



                          for radiation                           

 

             Lab Interpretation Abnormal                               



             (test code = 71085-8)                                        



MD Blackwell.[id number] 17:33:12





             Test Item    Value        Reference Range Interpretation Comments

 

             WBC (test code = 1.3 K/uL     4.0-11.0     L            



             8034)                                               

 

             RBC (test code = 3.75         See_Comment  L             [Automated



             6332)                                               message] The sy

stem



                                                                 which generated



                                                                 this result



                                                                 transmitted



                                                                 reference range

:



                                                                 4.00 - 5.50 M/u

L.



                                                                 The reference r

varghese



                                                                 was not used to



                                                                 interpret this



                                                                 result as



                                                                 normal/abnormal

.

 

             Hgb (test code = 10.8         See_Comment  L            As part of 

CBC or



             5898)                                               as an individua

l



                                                                 orderable testi

ng



                                                                 performed at [facility], [address], [address],



                                                                 [place], [place] [address]

[address]



                                                                 [Automated mess

age]



                                                                 The system whic

h



                                                                 generated this



                                                                 result transmit

kristina



                                                                 reference range

:



                                                                 12.0 - 16.0 gm/

dL.



                                                                 The reference r

varghese



                                                                 was not used to



                                                                 interpret this



                                                                 result as



                                                                 normal/abnormal

.

 

             Hct (test code = 33.3 %       37.0-47.0    L            As part of 

CBC or



             5860)                                               as an individua

l



                                                                 orderable testi

ng



                                                                 performed at [facility], [address], [address],



                                                                 [place], [place] [address]

[address]

 

             MCV (test code = 89 fL        82-98                     



             6222)                                               

 

             MCH (test code = 28.8 pg      27.0-31.0                 



             6220)                                               

 

             MCHC (test code = 32.4         See_Comment                [Automate

d



             6221)                                               message] The sy

stem



                                                                 which generated



                                                                 this result



                                                                 transmitted



                                                                 reference range

:



                                                                 31.0 - 36.0 gm/

dL.



                                                                 The reference r

varghese



                                                                 was not used to



                                                                 interpret this



                                                                 result as



                                                                 normal/abnormal

.

 

             RDW-SD (test code = 53.6 fL      35.1-46.3    H            



             6972)                                               

 

             RDW-CV (test code = 17.6 %       12.0-15.5    H            



             6971)                                               

 

             Platelet count (test 105 K/uL     140-440      L            As part

 of CBC or



             code = 6832)                                        as an individua

l



                                                                 orderable testi

ng



                                                                 performed at [facility], [address], [address],



                                                                 [place], [place] [address]

0

 

             MPV (test code = 8.8 fL       4.0-10.4                  



             6282)                                               

 

             INRBC (test code = 0.0 %        See_Comment               The INRBC



             5974)                                               (instrument NRB

C)



                                                                 value reflects 

the



                                                                 enumerationof



                                                                 nucleated red b

lood



                                                                 cells contained

 in



                                                                 a 200uL sampleo

f



                                                                 whole blood



                                                                 analyzed by the



                                                                 instrument. Thi

s



                                                                 value maydiffer



                                                                 from the NRBC v

alue



                                                                 reported in a



                                                                 manual



                                                                 differential,wh

ich



                                                                 is based on a 1

00



                                                                 cell differenti

al.



                                                                 As part of CBC



                                                                 testing perform

ed



                                                                 at [facility]



                                                                 Ambulatory Care



                                                                 [id number],



                                                                 [place] [address]

0



                                                                 [Automated mess

age]



                                                                 The system [employer]



                                                                 generated this



                                                                 result transmit

kristina



                                                                 reference range

:



                                                                 <=0.0. The



                                                                 reference range

 was



                                                                 not used to



                                                                 interpret this



                                                                 result as



                                                                 normal/abnormal

.

 

             PRABHJOT (test code = PRABHJOT) Please schedule                           



                          on  when                           



                          patient is here                           



                          for radiation                           

 

             Lab Interpretation Abnormal                               



             (test code = 19314-6)                                        



MD BlackwellUltrasound Guidance - Intraoperative (with Radiologist) (CPT 38479)
2020 15:50:03Ultrasound guidance for placement of tandem and 
ovoids.Interface, Radiology Results In - 20209:52 AM CSTFormatting of this
note might be different from the original.FULL RESULT:Examination: ULTRASOUND 
GUIDANCE - INTRAOPERATIVE on 2020 8:55 AMClinical History: Cervical 
cancerIndication: Localization of MassComparison: MRI 2020Technique: 
Operative ultrasound was performed in the radiation therapy department, in 
conjunction with radiation therapists, Dr. Elliott:Additional saline 
was requested in the urinary bladder to position the uterus adequately in view 
of its extremely anteverted anteflexed position.Under ultrasound guidance sounds
were placed through the cervical canal, reaching up to 1 cm proximal to the 
uterine fundus, where a small amount of endometrial fluid was already located. 
This was confirmed at real-time examination for satisfactory placement of 
ovoids.IMPRESSION:Ultrasound guidance for placement of tandem and ovoids.MD BlackwellUrine [id number] 13:34:14





             Test Item    Value        Reference Range Interpretation Comments

 

             Final Report (test code 51-99,999 cfu/ml              A            



             = 8488)      Normal site constantin                           



                          present.Generally of                           



                          low                                    



                          significance.Correlate                           



                          with clinical data and                           



                          culture history.                           

 

             Path Review - Urine The results have been              A           

 



             (test code = 8483) reviewed and                           



                          electronically signed                           



                          by Pathologist:GEOVANNA CHIRINOS MD #[id number]                           

 

             PRABHJOT (test code = PRABHJOT) Please schedule at                           



                          Main campus                            

 

             Lab Interpretation Abnormal                               



             (test code = 15425-1)                                        



MD BlackwellPETCT Contrast Enhanced Initial Treatment [id number] 14:54:20
Primary neoplasm is noted within the cervix. Nodular density is noted in the 
paracervical region, may represent a paracervical lymph node Left common iliac 
lymph node has been biopsied and demonstrated no evidence of lymphoid tissue.   
Interface, Radiology Results In - 2020  8:56 AM CSTFormatting of this note
might be different from the original.FULL RESULT:Examination:  Contrast-Enhanced
FDG PET/CT, 2020 4:30 PMClinical History: Squamous cell cancer of the 
cervixIndication: Evaluate disease statusComparison: None correlation was made 
to an MRI dated 2020Technique: Following intravenous administration 
of 10.9 mCi of F-18 FDG via the left and cubital vein a PET/CT was performed 
from the vertex of the skull to the proximal thighs. The blood glucose level at 
the time of injection was 105 mg per DL. CT scanning was done for attenuation 
correction, image registration, and diagnosis with scan parameters optimized to 
minimize radiation exposure to the patient. The CT portion of the examination 
was performed with intravenous and enteric contrast. SUV measurements are 
reported as maximum SUV based on body weight unless otherwise specified. 
Findings: Head and Neck: No FDG avid disease is noted within the brain. No neck 
adenopathy is noted to suggest metastatic disease.Chest: Biopsy-proven pulmonary
nodules are noted within the thyroid gland. No lung nodules identified to sugge
st metastatic disease. Nonspecific right upper lobe pulmonary nodule is noted 
measures less than 5 mm and is unchanged since the prior study. The heart is 
normal size.Abdomen and Pelvis: No focal hepatic lesions identified to suggest 
malignancy. There is no intrahepatic or extra hepatic biliary ductaldilation. 
The spleen the adrenal glands and the pancreas are normal. There is no evidence 
hydronephrosis. The gallbladder is normal.A left common iliac lymph node is 
noted measures 1.4 x 1.1 cm is FDG avid has a maximum SUV of 4.5 has been 
biopsied previously however no lymphoid tissue is noted withinthis lymph node.A 
mass is noted within the cervix has a maximum SUV of 13.6. Left paracervical 
nodule is identified has a maximum SUV of 4.0 and is of concern for metastatic 
disease. Fibroids are notedwithin the uterus. Cysts are noted within the ovaries
bilaterally.Musculoskeletal: No definite skeletal metastases are 
noted.IMPRESSION:Primary neoplasm is noted within the cervix. Nodular density is
noted in the paracervical region, may represent a paracervical lymph nodeLeft 
common iliac lymph node has been biopsied and demonstrated no evidence of 
lymphoid tissue.MD BlackwellTMP Interpretation Antibody Screen [id number]
02:03:55





             Test Item    Value        Reference Range Interpretation Comments

 

             TMP Auto Neg  At the present                           ____________

____________



             ABSC Interp  time, patient                           ______________

__________



             (test code = plasma shows no                           ____SHELBY GARCIAS MD -



             7535)        evidence of RBC                           00914Hcmrpod

d by: SHELBY



                          alloantibodies.                           MD Shahzad LOWE



                                                                 [id number] D

ate/Time:



                                                                 2020 20:0

3 PM CST



                                                                  Transcribed Da

te/Time:



                                                                 2020 20:0

3 PM



                                                                 CSTElectronical

ly Signed



                                                                 By: MD Shahzad MOROCHO



                                                                 [id number]  on 



                                                                 20:03 PM



MD BlackwellAntibody [id number] 23:23:07





             Test Item    Value        Reference Range Interpretation Comments

 

             ABSC. (test code = Negative ABSC                           



             0-4)                                              

 

             PRABHJOT (test code = PRABHJOT) Please schedule at [facility]                                 



MD [id number] 23:22:52





             Test Item    Value        Reference Range Interpretation Comments

 

             ABORh. (test code = A POS                                  



             882-1)                                              

 

             PRABHJOT (test code = PRABHJOT) Please schedule at [facility]                                 



MD BlackwellClot Expiration [id number] 23:22:50





             Test Item    Value        Reference Range Interpretation Comments

 

             T & S Expiration (test code = 2020                           

  



             5318)                                               



MD BlackwellConfirm [id number] 23:22:13





             Test Item    Value        Reference Range Interpretation Comments

 

             ABORh Confirm. (test code = 882-1) A POS                           

       



MD BlackwellPartial Thromboplastin [id number] 20:54:36





             Test Item    Value        Reference Range Interpretation Comments

 

             aPTT (test   27.3         See_Comment               Testing Perform

ed



             code =                                              at[facility] Lab Ambul

atory



             [address]-5)                                            Care [id number]



                                                                 [facility], 

[address]



                                                                 [address]



                                                                 [id number] Licen

se:



                                                                 [id number]



                                                                 [Automated mess

age]



                                                                 The system [employer]

[facility]



                                                                 generated this



                                                                 result transmit

kristina



                                                                 reference range

:



                                                                 24.2 - 36.0



                                                                 second(s). The



                                                                 reference range

 was



                                                                 not used to



                                                                 interpret this



                                                                 result as



                                                                 normal/abnormal

.

 

             PRABHJOT (test code Please schedule at                           



             = PRABHJOT)       [facility] lab                           



                          cannot be scheduled                           



                          at the following                           



                          locations due to                           



                          collection/proccessi                           



                          ng restrictions:                           



                          [facility]                           



                          and [facility]                           



                          CTR.                                   



MD BlackwellPT/[id number] 20:54:35





             Test Item    Value        Reference Range Interpretation Comments

 

             PT (test code 13.7         See_Comment               Testing Perfor

med



             = 5902-2)                                           [facility]

[facility]



                                                                 [id number]



                                                                 [address] 

[address]



                                                                 [address] [address]

[address]



                                                                 [Automated mess

age]



                                                                 The system [employer]



                                                                 generated this



                                                                 result transmit

kristina



                                                                 reference range

:



                                                                 12.0 - 14.3



                                                                 second(s). The



                                                                 reference range

 was



                                                                 not used to



                                                                 interpret this



                                                                 result as



                                                                 normal/abnormal

.

 

             INR (test code 1.10         0.90-1.10                 Testing Perfo

rmed



             = 6301-6)                                           [facility] Amb[facility]

atory



                                                                 [id number]



                                                                 [facility], 

[address]



                                                                 [address] [address]

[address]

 

             PRABHJOT (test code Please schedule at                           



             = PRABHJOT)       [facility] lab                           



                          cannot be scheduled                           



                          at the following                           



                          locations due to                           



                          collection/proccessi                           



                          ng restrictions:                           



                          [facility]                           



                          and [facility]                           



                          CTR.                                   



MD BlackwellUrinalysis with [id number] 20:51:13





             Test Item    Value        Reference    Interpretation Comments



                                       Range                     

 

             UA WBC (test code = 107          See_Comment  H             [Automa

kristina



             7904)                                               message] The



                                                                 system which



                                                                 generated this



                                                                 result



                                                                 transmitted



                                                                 reference range

:



                                                                 0 - 2 /HPF. The



                                                                 reference range



                                                                 was not used to



                                                                 interpret this



                                                                 result as



                                                                 normal/abnormal

.

 

             UA RBC (test code = 89           See_Comment  H             [Automa

kristina



             7891)                                               message] The



                                                                 system which



                                                                 generated this



                                                                 result



                                                                 transmitted



                                                                 reference range

:



                                                                 0 - 2 /HPF. The



                                                                 reference range



                                                                 was not used to



                                                                 interpret this



                                                                 result as



                                                                 normal/abnormal

.

 

             UA Mucous (test code 2+           TRACE /HPF   A            



             = 7887)                                             

 

             UA Bacteria (test 1+           NOT SEEN /HPF A            



             code = 7870)                                        

 

             UA Squam Epi (test 4+           OCC /HPF     A            



             code = 7896)                                        

 

             PRABHJOT (test code = Some reporting                           



             PRABHJOT)         parameters within                           



                          the Urinalysis test                           



                          have changed due to                           



                          the implementation                           



                          of new                                 



                          instrumentation in                           



                          the [facility],                           



                          allowing greater                           



                          sensitivity of                           



                          measurement.                           



                          Urinalysis results                           



                          reported by the                           



                          [facility] using                           



                          existing                               



                          instrumentation, as                           



                          well as Urinalysis                           



                          testing performed                           



                          manually or by                           



                          backup methodology                           



                          at the [facility]                           



                          will remain                            



                          relatively                             



                          unchanged. New                           



                          reporting parameters                           



                          and units will now                           



                          be reported for all                           



                          [place].                              

 

             Lab Interpretation Abnormal                               



             (test code =                                        



             83113-8)                                            



MD BlackwellUrinalysis with [id number] 20:48:46





             Test Item    Value        Reference Range Interpretation Comments

 

             UA Color (test code = Yellow       Yellow                    



             7877)                                               

 

             UA Appear (test code = Cloudy       Clear        A            



             7868)                                               

 

             UA Glucose (test code = NEG          NEG mg/dL                 



             7881)                                               

 

             UA Bili (test code = 7871) NEG          NEG                       

 

             UA Ketones (test code = NEG          NEG mg/dL                 



             7884)                                               

 

             UA Spec Grav (test code = 1.018        1.002-1.035               



             7894)                                               

 

             UA Blood (test code = Moderate     NEG          A            



             7872)                                               

 

             UA pH (test code = 7909) 5.0          4.5-8.0                   

 

             UA Protein (test code = 30 mg/dL     NEG          A            



             7890)                                               

 

             UA Urobilinogen (test code NEG          NEG                       



             = 7903)                                             

 

             UA Nitrite (test code = NEG          NEG                       



             7888)                                               

 

             UA Leuk Est (test code = Moderate     NEG          A            



             7886)                                               

 

             PRABHJOT (test code = PRABHJOT) Please schedule at                           



                          [facility]                            

 

             Lab Interpretation (test Abnormal                               



             code = 67778-0)                                        



MD BlackwellUrine [id number] 20:23:57





             Test Item    Value        Reference Range Interpretation Comments

 

             U beta hCG Ql Negative     Negative                  Very dilute ur

ine



             (test code =                                        specimens may c

ause



             4181)                                               false negative



                                                                 results. Sugges

t



                                                                 repeat in 48 ho

urs



                                                                 with a first mo

rning



                                                                 voided urine or



                                                                 request quantit

ative



                                                                 serum beta HCG 

test.



                                                                 Testing Perform

ed



                                                                 atA Lab [facility]

[facility] [facility]



                                                                 [address] [address]

[address]

 

             PRABHJOT (test code Please schedule at                           



             = PRABHJOT)       [facility]                            



MD BlackwellPathology Biopsy [id number] 21:52:00





             Test Item    Value        Reference Range Interpretation Comments

 

             Diagnosis (test code = 34) r3lurXPiZCKwhRG5QbZ                     

      



                          zRQQvm4xhc3HnvRWjdS                           



                          BfLRhxeHMnhlNkqa19r                           



                          VT3vH28RJ0wJKFrUrO4                           



                          FXLegxL9Lwl4OVUcLCX                           



                          ozCYsR117b5aov0ibyc                           



                          HxhXB9mEhpMPWoECTdI                           



                          YauUBLrSoRoHQ3eGGnu                           



                          tHnfnj2nSTbrOSmtjFH                           



                          ymSRmDYi4oFG5TLVeht                           



                          fluKrgTUrkzJ99DqHsS                           



                          cdwzo1oNDnaf8CwZUlk                           



                          wKutqSt3a3o9EAngahP                           



                          gP5kxicpgAYSoDVKnoW                           



                          4dVFNmrZWbHR00ug3lh                           



                          UCaXQ1cXUy0hUMdIP5m                           



                          XSSvzSron6OoJJswTI8                           



                          7oIRiOIRiLNPfKPWdd1                           



                          4uIZ78BZrdGDOgyKlzF                           



                          WoxmtNmfYViGBbCQ5ZE                           



                          XHBhcn0=                               

 

             Comment (test code = 9835) j9srhOPzHEWigAQ2TcE                     

      



                          kYERqf5gyn2OyxFOtvA                           



                          IeRVlfjLCpciUqvs78i                           



                          ND9qX95KC6qRBYhSpY0                           



                          BDYftiI9Mqv1OWIjJLA                           



                          cmZKqJ932u8vyi3azcg                           



                          XyxUJ0lUgfBPAdPKEwX                           



                          WluXGZzMjAgUmVwZWF0                           



                          OGRur0Q3UYDnhmSclLY                           



                          6pC2qZTsiBKxomjYmtg                           



                          SiFU7dTFQwrc5=                           

 

             Gross Description (test i0sspIYhJXMhd3atSHS                        

   



             code = [id number]) qErVgQJRJs6njm084qN                           



                          GhJYawHmRgXbV8tGNgW                           



                          XZymEDgw1T5EQvfrISv                           



                          BdEWbnuyzGv1p7yzI6e                           



                          bgs0zVN1iNuGwRKQyGO                           



                          QwXGZwcnEyIFRpbWVzI                           



                          L1izkZVl84zhgq3d8wu                           



                          JBssfS8vJCZpOWHidKN                           



                          qu7M5DEyiwPDiQXIDr3                           



                          HmhUQjYT4jmsf4t3wlZ                           



                          Vyay8teh4ViQlYdZWLc                           



                          ZXQwXGZwcnEyIEFyaWF                           



                          bPT7mieHufft1i3bvHJ                           



                          XzWs6cMGSpqydsR4umm                           



                          hLgnDRnQjJxvLTfO488                           



                          szdtasn5c4zdERPxZb8                           



                          foXtrW5vmhxYahEPiPx                           



                          BycTIgVHJveSBNaWNyb                           



                          sFkeTA7eKzaOwYqJYRu                           



                          o55ebeltJ4mtwjBpfUQ                           



                          pVjGqzIYwA4aaTf6zC9                           



                          23SPAeOPgxg5say9HkR                           



                          mNoYXJzZXQwXGZwcnEy                           



                          OMLdS78iMWHQP051UBE                           



                          vXBPwVUOni1dph3jdR3                           



                          hhcnNldDBcZnBycTIgQ                           



                          OPzBQy2kW3Ta5ydm7vu                           



                          beHxsAX9QIMaAFKsB6D                           



                          pQU3bWGIgaAJbW1wtAV                           



                          MpOLeuswKexdL2EBDfj                           



                          JDgEJnpfhGdBhBkV2Ul                           



                          YU9eDDpyfUPuLuU2YVX                           



                          qRIR0QAfdWSRbYOwpNh                           



                          m2HKQ5G4wgYVZ3K3oic                           



                          vKeovMlHMUbzMQ1Qwqh                           



                          dgGbVzxmE4ZdOL20NCj                           



                          kwBGpGgg2WRJkKEl3DG                           



                          taGZJwOBHmEft2FEb4F                           



                          7bqEUIrZVYaH5ZrFE9r                           



                          KWThRvg0WPPvARqtxjW                           



                          gNFW6OCryGXHyFYS3OH                           



                          XocFZiLiq5RCTtLWE7F                           



                          7asxeHzjaN0G9jqlXPw                           



                          RVNqX8zoFLWxSGrjG2Z                           



                          gZK5dZJfuXsl6RDA3DW                           



                          tutaNxKTw9GIpiPIAgK                           



                          Pa0XXYxiPRiKfT0QAZj                           



                          HSP5GDqajuUankC9UVu                           



                          vfZHuRIbhZ8roMCTpPO                           



                          gjJ8TmMW3uIDjqXjs7Q                           



                          TIwNztccmVkMjIzXGdy                           



                          ZWVuMjIzXGJsdWUyMjM                           



                          7XHJlZDIzOVxncmVlbj                           



                          VyVSlygVFmXvW9X4qqL                           



                          KDaBFNnJ8HhDI5hZXXh                           



                          Rxk8HQH4HLsdmxEsAGv                           



                          wcdBtdhYbWfi2VFY1NZ                           



                          y9Woghn3C2uUCcsGHow                           



                          HtcczEgaGVhZGluZyAx                           



                          P003XEAfPAvlPXEwysa                           



                          wChc6n1ufFhUmZZHwzW                           



                          7nBOD0yUmetaFjwJPtY                           



                          HqrPkE5H890DVL5OLgc                           



                          RSLqxepoCJj4x0okSlQ                           



                          aFOEndK1hARL8bB7BCh                           



                          olKQDovwiaGHs8ONtmJ                           



                          IZsgygfUrL4GLxwJINx                           



                          pEu8HJciDSComlD8Pam                           



                          tYXJndDcyMFxtYXJnYj                           



                          ryROzdTQTeHCJ8QkKpS                           



                          KFnn7Frpgt1TeBtGjNo                           



                          BRQURstybATyRVS8QGO                           



                          0ZjFcZXBpYzkyMDBcYW                           



                          7xiAjaxZd9nZncNXLlb                           



                          kO7oAPoKLzeo6xtUAC3                           



                          y0irtqxtJSHkIAbvGb2                           



                          udHRibHtcZjAgQXJpYW                           



                          m1yJ10NDWmeA0fdDYiO                           



                          Kq2YXJxloJmuAodmC9s                           



                          VkAfCSVMYxVBrQ4mwXA                           



                          Pu4EhIENeXZTWEIW7ED                           



                          HQFHm9jIX9NJOyjkWte                           



                          3HhNN0eCPFcezMrC72t                           



                          LEHll5IroGxenpBon5V                           



                          9WKTcc1B9IWM8vMV3GQ                           



                          RdJ7BjW9B8ODA3yqTeA                           



                          jAgeCAwLjMgeCAwLjEg                           



                          V997DHZeyJfzZTz2YRD                           



                          1Se7fgPFrAFRjteQMUJ                           



                          3mUAjmzt26RAO0KRHng                           



                          8TgF4JkXUqOACXcu6Np                           



                          D2AkOCzbjCVigvvvcsU                           



                          uWIDgfa6flHQbuVr8                           

 

             Disclaimer (test code = u2iqpIEhXGEqeQMmSvU                        

   



             9844)        yNTYsOBVdj4edDEWhhN                           



                          FuZzEwMzNcZnRuYmpcd                           



                          GLpKOFuZtFyw2tds137                           



                          mYXuc4pdXXCfLcB2tSA                           



                          bPEDcvDMgA054RGCeZJ                           



                          nfb8trz5LjZRHebBFob                           



                          3R5BBXOmghdzUw7uOgo                           



                          S13gy7Z5RvbqM0xoZYR                           



                          qKYDwG1ElKN8xZOOeRl                           



                          r1DSE8RHR9VHMbDBZyT                           



                          1EsFS1vXRUatGLeWJx8                           



                          e4qbxQnwOPIkCME9o2t                           



                          xDBzdqrPsFH8iig5vvE                           



                          c3d5lcbqEhFVCpVUEuq                           



                          OESFCKgP3BdhQdlYo7e                           



                          jPr1zYluOozzOYQ0Fwx                           



                          8WD2nuz96nlp0bEkdXG                           



                          FsyrfkObV9VRsmRNElc                           



                          odtWAh8QIxhGGJgzSR6                           



                          KCCxxYAoJ3AaQHSoIX5                           



                          jwbq5PQK5AZhpDYVsQc                           



                          F9WQWqyTObGDFwcIvpG                           



                          Tkyu012UBW1QuWwSB4y                           



                          J0Jty5J7zN0qaCWvQHF                           



                          uqOBbOsExFLEfon8qiD                           



                          CiYTjmb6HzBVG2kgY7o                           



                          EHgwYHwQAVoUL19Kycy                           



                          j0DrBkdiSMI3HMRtxlB                           



                          at5Tdz7qvHxFkqyExR1                           



                          ciZ9LbRWHkIGLaETDrV                           



                          eHumsLpy7Mfk6FwyGUt                           



                          jRz8r8knLOWcCKIjqGq                           



                          ez2zvJLH9HDVpC9Y9fX                           



                          Cfh0ioBIkmTVZpaJD4f                           



                          vB8AVUshLYfF0EacO6d                           



                          HPEsDG5bchm0q0pcHWV                           



                          2OLkySQRjCzQ6gtM2OZ                           



                          BcaGVhZGVyeTcyMFxmb                           



                          750VBW5YvLtTUMet6Ui                           



                          Q1FncToiX76fdUbmS12                           



                          lUKKmeCblxF6nqPxxyX                           



                          5cZjBcZnMyNFxxbFxwb                           



                          TDbdupgZZzjrxU4LQvw                           



                          ttkdRQCcVIhbC8eoAyF                           



                          iOOAskCeuONdtt0EkVW                           



                          FtSDYsQohaakA1SYXXe                           



                          77pYHEmw1EsILWqhU1f                           



                          wSOvIBeglvZkoJQ3ZEv                           



                          hdmUgYmVlbiBkZXZlbG                           



                          6dSDEfJW8aCQQtiiBjy                           



                          d4vuuKtQHWhMQOzW9Vz                           



                          cmlzdGljcyBkZXRlcm1                           



                          lgvXeKHP5VBEXEV1VQY                           



                          NsYVMtc03eACQokBqnd                           



                          X6siNFeqnBbAGEqb0Gp                           



                          gC8exHADBVCdX3epNN9                           



                          oCFgpt0TqcGLftJYjgB                           



                          R4DUZsv0BrLnKrszZpi                           



                          ANmzHLjE4OxiDrcG9dp                           



                          HFQtARXcipDoiWHvm4E                           



                          dQBKdsCB7iXMhYD8VEf                           



                          XRv69bXIXiARFEdxCsU                           



                          ZJhsPjbbXT7rvL5rO5d                           



                          LiBJZiBhcHBsaWNhYmx                           



                          tNWRbh084kg6egrY8ZF                           



                          VsDQYrnsdqr6GtAXEyI                           



                          QIhjN22WQRaFDRymt0d                           



                          bibiiINssyJtK0Prrtm                           



                          9uU1mHAMqNEelEZSrLU                           



                          ZzMjJcbGFuZzEwMzNca                           



                          GljaFxmMVxkYmNoXGYx                           



                          HIuxU0btQnKaTjAbJca                           



                          wYXJ9                                  



MD BlackwellIR CT GUIDED BIOPSY MUSCLE/SOFT TISSUE [id number] 21:13:01Date 
of Procedure:  20  Attending Physician: Mj Chun MD Assistant:  None 
Pre Procedure Diagnosis:  Malignant neoplasm of cervix uteri, not otherwise 
specified   Post Procedure Diagnosis:  Unchanged  Indication:  Evaluate for 
metastasis Protocol Number:  None Title of Procedure:PercutaneousComputed 
Tomography-Guided Biopsy Operative Findings:  Percutaneous image-guided biopsy 
of 1.8cm left retroperitoneum lesion. Consent:  The procedure, risks, 
indications and alternatives were explained. All questions were answered and 
informed consent was obtained.    I have reviewed the history and physical 
dictated by the mid-level practitioner / fellow. Sedation/Anesthesia:  Moderate 
sedation for pain control and anxiety was administered by a dedicated nurse 
under my supervision.  There was continuous monitoring of oxygen saturation, 
heart rate and intermittent monitoring of blood pressure during the procedure.  
Medication given was midazolam and fentanyl.   I was present for the 
administrationof the medications indicated above.Procedure Events Event Event 
Time Sedation Start 2020  2:15 PM Sedation End 2020  2:41 PM   
Procedure in Detail:  A time out was performed prior to the start of the 
procedure and the correct patient, procedure, presence of consent, site, and 
side were confirmed with all members of the team. With the patient in the prone 
position, the skin overlying the area of interest was prepped and draped in the 
usual sterile fashion.  Lidocaine 1% was used for local anesthesia. Using a 
posterior approach under Computed Tomography image-guidance, a 17 gauge needle 
wasadvanced down to the left retroperitoneum. An image was obtained and placed 
into the medical record.Samples were obtained for evaluation.  Sampling: Core 
Biopsy:  An 18 gauge needle used to obtain samples for surgical pathology 
evaluation.  Total number of samples:   3 Specimens Disposition: Diagnostic 
Biopsy:  The biopsy samples were submitted to pathology. Additional Comments:  
This was an exceedingly difficult biopsy due to target location.  If repeat 
biopsy is necessary due to non-diagnostic result, recommend considering an 
anterior approach. Estimated Blood Loss:  Minimal Immediate Complications:  None
Disposition:  PACU Plan: 1. No follow-up with Interventional Radiology required.
MD BlackwellCytology Image-Guided FNA [id number] 17:49:00





             Test Item    Value        Reference Range Interpretation Comments

 

             Gross Description (test v5efkPSdNSUib9ahNWG                        

   



             code = [id number]) zGyRkGXTYn3prs528uJ                           



                          CcKSqvPlRjWmD9xLMaY                           



                          YVopVWgw8F3OPbkpUJf                           



                          VvGHifkegTs3l4igT9e                           



                          ewc4nWI8tBzLvINZyUF                           



                          QwXGZwcnEyIFRpbWVzI                           



                          N3hkyNCh84qrrq9h8rd                           



                          BLopcX4nWBZrPLOfyED                           



                          cn3U3PSgfvOAhWNSYb3                           



                          KeiCOzXE0nhwm4r0buS                           



                          Zofz9evu6WrUbBcCWNt                           



                          ZXQwXGZwcnEyIEFyaWF                           



                          uGL9ezhJesjw4k2yvFE                           



                          ElXu3nYVXobllqO7noo                           



                          cCczHPwKfUobLGiW992                           



                          glepixt9e1glCNDzSs2                           



                          adTckU9ajaqNqdRTpVj                           



                          BycTIgVHJveSBNaWNyb                           



                          dFuzIR7gAfrEuAaPGCg                           



                          z70yzhigZ2eeojMctNB                           



                          mQbXstSPcP0btMw1bF1                           



                          94HIUiLXlpg0fhz0VuB                           



                          mNoYXJzZXQwXGZwcnEy                           



                          XWBgN01aJTQSN954UEB                           



                          kZMPpLHJmh1hav9gzX6                           



                          hhcnNldDBcZnBycTIgQ                           



                          UOaUUc6rC5Ay1pcg1fk                           



                          bvMwaOI5MOHsCGJeG8N                           



                          tRT9lEYDscGStJ2cyEG                           



                          IkBQqzvbSltnW7YNOpw                           



                          YFvYGkyyjJuEmQgY6Sg                           



                          AB5hIVojlOTzHwF9IFS                           



                          kJFV6HGleGFGlYHtyMp                           



                          f1CEX7A4xlNUM1I2xcn                           



                          wUhniHtTXWsrAY9Gbng                           



                          lrAlDbyjQ0JlLZ30ZWb                           



                          ldTVjOnn7VFVrESm4HK                           



                          whQAYdPFZpZdl6LDy2C                           



                          3huGVTvSSCkM9BnKV1k                           



                          WONuNgs9MKFfXCxgjyQ                           



                          zUFI8RAynGQEqFTH2GL                           



                          NhcLLqUvl4PCSiTXG6L                           



                          3bqufWkxbD9O5kwyZTo                           



                          SZEjO0bzCCJwQOprR9R                           



                          gZF6bZJtzPut2IEK6JR                           



                          ohgxUnNGi2AWsoPRCeN                           



                          Ob6TKIhrDQmPwL2AMWe                           



                          HLV3JFulkdAcdeX4JSb                           



                          bvWTxGQjeN5urUQGiLM                           



                          cjT9AsKB7gHYgcEmg8B                           



                          TIwNztccmVkMjIzXGdy                           



                          ZWVuMjIzXGJsdWUyMjM                           



                          7XHJlZDIzOVxncmVlbj                           



                          NwMQqhcUBgZuH2X2wnT                           



                          QJuWDIpJ4ClEK8aHVLj                           



                          Ynm6GIL6TVldttLaZUn                           



                          jtdNbqqQwMvq1SJJ7ZT                           



                          n6Plift7B3hXQdzEPne                           



                          HtcczEgaGVhZGluZyAx                           



                          U605EMSaMRpxTCCmwls                           



                          wPho0g7dyKlUlUILseC                           



                          5aBQF9eTayvbYqsGEfH                           



                          VibXiX9V820NHM0ZJdg                           



                          NCPpcthbLJk5f7gdPkW                           



                          yEQRnfN5iBXO5hI4VMc                           



                          mjWJDkasivAJr7VLsvV                           



                          LUzvfdmPqT6TPdxRQHb                           



                          qVt7JJkvDPMshkX8Bee                           



                          tYXJndDcyMFxtYXJnYj                           



                          xbRHgqHCXiLEK4VaCoR                           



                          MUgq2Fmehd6BkGxQrMk                           



                          LRKIEjpnzGBlOGV6FNM                           



                          0ZjFcZXBpYzkxMDFcYW                           



                          8dsBfagEe3qUiqCOFnf                           



                          sS4dLMmCTgkk8gjGJW2                           



                          w8aucbwiMJPuSSarTe8                           



                          udHRibHtcZjAgQXJpYW                           



                          n7iE71VGOmmP1gfJHvV                           



                          CqhxoTmVuR6OZjeQFQw                           



                          XiE3VNUacKAiJLU2oOp                           



                          wYXJkXHBsYWluXGZzMj                           



                          CmW8GnF4hpHG2oTWYim                           



                          0R4afSbMordNSZbJeRS                           



                          kLRcFHK1bSb7OMFoUIL                           



                          uIHS7ZUtrARRrpCVik1                           



                          xwYXIgMTAgbWwsIFxwc                           



                          q78OJF9DFXgx4OuS6Sx                           



                          WRHtt1ZwxHQmuCGouQW                           



                          jdDBccHJvdGVjdDAgYm                           



                          vop0M7ZOEfc3JtQ1TiY                           



                          ZmucRDlxdsona95DQX9                           



                          MFxmczIwICBmbHVpZFx                           



                          tqg28LLG9CFOdcJeavC                           



                          3dSpRiMBYuoV7mGwAUZ                           



                          UupPIBlMCJJkUUin0Uk                           



                          blxwYXJccGFyXHBhciB                           



                          WaKwvVvNnX7vlEzAdyW                           



                          WbEs92FFzuYp78IBenX                           



                          G62TTUeDNAkwqmegMRg                           



                          gBC1NEMMjC1dDSzgiHE                           



                          gYXNzZXNzbWVudCBmb3                           



                          Xhv0QjI7ehCA9oHOFlb                           



                          ULmJ7bdn6AhUR4kDKTa                           



                          SMGye2FgZ6ZpbTTvaSR                           



                          jdDAgeDFccHJvdGVjdD                           



                          AgXHBsYWluXGZzMjAgI                           



                          YK5WGGxCqVDvX3yxDhk                           



                          QIYkGQXkcb2rkNWfaOi                           



                          9                                      

 

             Immediate Assessment (test Adequate                               



             code = 9837) cellularity, favor                           



                          benign                                 

 

             Major Classification (test NFMC/benign                            



             code = 9839)                                        

 

             Diagnosis (test code = 34) d3hsvWGxSRTvsZJ6KVC                     

      



                          zRDGow2ick0EjdWKetM                           



                          EjRUzxqMVmnkZocq42w                           



                          VH1uM98SN5eYIVkTjQ3                           



                          RFZfqeD6Ifj2DSLbXGS                           



                          uoTAwX072y4eji8dhvj                           



                          PohRW0jAlqCXKdTKRpF                           



                          KfrRMLvIjGjZG3nDMi4                           



                          po0qDKpttF6xYIFgl8W                           



                          iatdkiLMseO6wUCpcDb                           



                          luZSBuZWVkbGUgYXNwa                           



                          XJhdGlvbjpccGFyXHRh                           



                          ExhgMSBroZx9LqXbsFl                           



                          uNzIwIENvbGxvaWQgbm                           



                          8zgPzwQTrgmJhqQ9muq                           



                          QrvHUEeI9JlPNBqbEct                           



                          blxwYXJ9                               

 

             Retained/Biomarker Testing k9hnmHYeJRClgJH3KYH                     

      



             (test code = 9838) pEUFqk7ete5SlmVQivM                           



                          MxFMoxbJCbbnNjpo04j                           



                          BG9jX35UV8vBZXxBjZ0                           



                          IDRqswR4Vei5RCUdBFI                           



                          muPZtR233o9eru9ghrj                           



                          GxeSM2sUisCPJqTLBtO                           



                          NvdRHOfBrAbI7O4GNlz                           



                          A7uaOYY6                               

 

             Informational Points (test e5akvGJpBVUtmYScQuI                     

      



             code = 9836) zVOWmCFIvq8efXADjzZ                           



                          FuZzEwMzNcZnRuYmpcd                           



                          XYiAAPdTsKby3foq327                           



                          hSBom1svBONjDuJ7wOF                           



                          zEXAxzEKcU747NPMfJN                           



                          ldr0elz1SwNFTxuCIgr                           



                          0S4ZKFQJHobZWRCGEa0                           



                          e5miIyOvAfH9tBRiSHg                           



                          uB5bitlQlbIRbZKBzYU                           



                          j8yE24QKNqnF9fkTAyV                           



                          JxfhqOvCqK0AGapGMNy                           



                          InM6CBHyeBJaCZJbA9s                           



                          yZWQwXGdyZWVuMFxibH                           



                          ZcNBO4pDnsw2O3sAHeb                           



                          GVldHtcZjBcZnMyMiBO                           



                          c6TiYPt2eKnnQ9JvVRV                           



                          zIpS1oIUkYSLaJRksXW                           



                          CbJJDnfrJ4vE37TQgun                           



                          aI4zAOmp0Kdy00no466                           



                          kV7ymXCaNHX1RIGiIEB                           



                          gkRIvDIHzVQK1GRPvcP                           



                          LuL6ykAFOuUW1denkeF                           



                          ZjaPEzbMNWhaNN2FREm                           



                          jNBpS9EpZPIgRNmsPHP                           



                          xrhy5XxHaQg5ytMBamQ                           



                          wgZWnbm7jgv3tutNYnC                           



                          nq4CYBgDoIsPbcgQZks                           



                          m7Arn3buUSTwix4eSUN                           



                          6gDEoeXglf4Z9oVRvQY                           



                          QujMElvmQwQBZqLxK8X                           



                          DykDR3mdr62ORAwXYN2                           



                          fn2kbLNaeLmwhyIjgUV                           



                          vHUjeJ5FxWHQyq101NL                           



                          ImP4ZaFWDmv0G6lbRzR                           



                          fFxKOFriRV3quL1NQEg                           



                          XAl2xFYasqG4ayMilVY                           



                          lV2pucD0kGJDnGY7dsl                           



                          prz5ftRLoyJDjjKRFam                           



                          EV5kjA7XDTzsVUjI7Ng                           



                          aV4nXPCtTNykPYLvnmv                           



                          7AoAvKg4jfVZmwWogZP                           



                          xzYmtwYWdlXHBnbmNvb                           



                          nRccGduZGVjXHBsYWlu                           



                          XHBsYWluXGYwXGZzMjR                           



                          pgKqipVehkH9fRrAkVi                           



                          YwIMocER8eYVBfJ6tfe                           



                          VNqWCBuUGAzN6rfSaRr                           



                          iW1kdVkkPKjgdzP9HAa                           



                          kU47qFRE6XAT1etJsZU                           



                          BvlzUkFAUmLOUkUS4fw                           



                          WMoABAlWNFaZW3oIEP2                           



                          ZWxvcGVkIGFuZCBwZXJ                           



                          bn2VxVX9yPNKucAJsWW                           



                          S0YAIrn6OsQ8QmGMT3J                           



                          JEskZ6bVHArfZXASTNA                           



                          RCBBbmRlcnNvbiBQYXR                           



                          tu0atY4edPU0mXVobWx                           



                          9yYXRvcnkgTWVkaWNpb                           



                          zDiIOBjAYJfMULsn7Pm                           



                          JCifhqNmvm08KQFhAE3                           



                          ex3TiN2rznWKnlWk7YQ                           



                          NbHTTaVCCqx8JjPDSdy                           



                          u15VQVeHjqucKahDQJg                           



                          Fu7zXl1sELMeruJbBIH                           



                          0OoQYQV5bfktpsOJsgB                           



                          pfkl3cLDWfNBnnAGSrD                           



                          GZzMjJcbGFuZzEwMzNc                           



                          aGljaFxmMlxkYmNoXGY                           



                          dVCouD9jdIcBdRmGrGx                           



                          xwYXJ9                                 



MD AndersonUS HEAD NECK SOFT [id number] 17:36:34 1. Percutaneous 
Ultrasound-Guided Biopsy of a 3.5 cm right inferior pole thyroid nodule.  2. 
Enlarged bilateral multinodular thyroid with no one nodule showing specific 
internal features to suggest malignancy. 3. Immediate cytologic assessment on 
2020 of the biopsied right thyroid nodule is negative for malignancy with 
findings favoring colloid nodule.. Final cytologic report is pending to be fo
llowed.  Interface, Radiology Results In - 2020 11:38 AM CSTFormatting of 
this note might be different from the original.FULL RESULT:Examination: US HEAD 
NECK SOFT TISSUE, US FINE NEEDLE ASPIRATION on 2020 10:38 AMClinical 
History: Multinodular thyroid.Indication: Multinodular thyroid with a 3.5 cm 
nodule requiring FNA.Comparison: None.Technique: Real-time grayscale and power 
Doppler ultrasound of the neck.Findings: Ultrasound shows an enlarged right 
thyroid lobe that measures 5.9 x 2.3 x3 cm. Within the inferior pole of the 
right thyroid, there is a 3.5 x 1.8 x 2.9 cm nodule. The nodule is predominantly
isoechoic to adjacent thyroid with regards to the soft tissue, although shows 
several large internal cysts. No suspicious findings.A nodule in the right 
superior pole measures up to 1.6 cm and shows comet tail artifact likely related
to colloid. A right mid nodule measures up to 1.3 cm and is mixed cystic and 
solid with no internal suspicious features.The left lobe measures 5.1 x 1.4 x 
1.5 cm with several nodules all of which measure under 1 cm without suspicious 
appearing features.No lymphadenopathy.The 3.5 cm right inferior nodule was 
selected for targeted FNA.Ultrasound-guided fine-needle aspiration of the 3.5 cm
right-sided thyroid nodule:Risks and benefits of procedure were explained to 
patient. Informed consent was obtained. The right neck was cleansed with 
alcohol. 1% Xylocaine was used for local anesthesia.Ultrasound-guided fine 
needle aspiration of the 3.5 cm right thyroid nodule was performed using a 20-
gauge needle under sonographic guidance. One pass was made. A second pass was 
made with a 25-gauge needle.Immediate cytologic assessment:Adequate 
cellularityPreliminary cytology result is negative for malignancy and final 
results are pending. Estimated Blood Loss: None.Immediate Complications: There 
were no immediate complications, and the patient was discharged instable 
condition.IMPRESSION:1. Percutaneous Ultrasound-Guided Biopsy of a 3.5 cm right 
inferior polethyroid nodule. 2. Enlarged bilateral multinodular thyroid with no 
one nodule showing specific internal features to suggest malignancy.3. Immediate
cytologic assessment on 2020 of the biopsied right thyroid nodule is 
negative for malignancy with findings favoring colloid nodule.. Final cytologic 
report is pending to be followed.MD Antonio Fine Needle [id number] 
17:36:34 1. Percutaneous Ultrasound-Guided Biopsy of a 3.5 cm right inferior 
pole thyroid nodule.  2. Enlarged bilateral multinodular thyroid with no one 
nodule showing specific internal features to suggest malignancy. 3. Immediate 
cytologic assessment on 2020 of the biopsied right thyroid nodule is negat
amina for malignancy with findings favoring colloid nodule.. Final cytologic 
report is pending to be followed.  Interface, Radiology Results In - 2020 
11:38 AM CSTFormatting of this note might be different from the original.FULL 
RESULT:Examination: US HEAD NECK SOFT TISSUE, US FINE NEEDLE ASPIRATION on 
2020 10:38 AMClinical History: Multinodular thyroid.Indication: 
Multinodular thyroid with a 3.5 cm nodule requiring FNA.Comparison: 
None.Technique: Real-time grayscale and power Doppler ultrasound of the 
neck.Findings: Ultrasound shows an enlarged right thyroid lobe that measures 5.9
x 2.3 x3 cm. Within the inferior pole of the right thyroid, there is a 3.5 x 1.8
x 2.9 cm nodule. The nodule is predominantly isoechoic to adjacent thyroid with 
regards to the soft tissue, although shows several large internal cysts. No 
suspicious findings.A nodule in the right superior pole measures up to 1.6 cm 
and shows comet tail artifact likely related to colloid. A right mid nodule 
measures up to 1.3 cm and is mixed cystic and solid with no internal suspicious 
features.The left lobe measures 5.1 x 1.4 x 1.5 cm with several nodules all of 
which measure under 1 cm without suspicious appearing features.No 
lymphadenopathy.The 3.5 cm right inferior nodule was selected for targeted 
FNA.Ultrasound-guided fine-needle aspiration of the 3.5 cm right-sided thyroid 
nodule:Risks and benefits of procedure were explained to patient. Informed 
consent was obtained. The right neck was cleansed with alcohol. 1% Xylocaine was
used for local anesthesia.Ultrasound-guided fine needle aspiration of the 3.5 cm
right thyroid nodule was performed using a 20-gauge needle under sonographic 
guidance. One pass was made. A second pass was made with a 25-gauge 
needle.Immediate cytologic assessment:Adequate cellularityPreliminary cytology 
result is negative for malignancy and final results are pending. Estimated Blood
Loss: None.Immediate Complications: There were no immediate complications, and 
the patient was discharged instable condition.IMPRESSION:1. Percutaneous 
Ultrasound-Guided Biopsy of a 3.5 cm right inferior polethyroid nodule. 2. 
Enlarged bilateral multinodular thyroid with no one nodule showing specific 
internal features to suggest malignancy.3. Immediate cytologic assessment on 
2020 of the biopsied right thyroid nodule is negative for malignancy with 
findings favoring colloid nodule.. Final cytologic report is pending to be 
followed.MD [facility]Path[facility] Outside [id number] 19:13:00





             Test Item    Value        Reference Range Interpretation Comments

 

             Materials Received (test o5ovuGDoGVWioMSeZmEd                      

     



             code = 9973) ONMoVVEuo1zfRKQyeULj                           



                          ZzEwMzNcZnRuYmpcdWMx                           



                          CMEfRkBfg0scu912qBGn                           



                          r4soAGPwLqS1dAJsULYq                           



                          tMIxR102DIRfZQrae2zw                           



                          b8VeSCClrZFgv6U9OKXH                           



                          sbleeQj8xRiqV67vb7P9                           



                          GwwyV7biBEVxMTHcN0Ym                           



                          FP8hKRLvOav7LAS0YFI2                           



                          LRYxSQTaC2WvBO9fTZDv                           



                          xNQsVJb2m2pihDthQDFx                           



                          JXF7q0tkHThlnkHlXA4g                           



                          ze7mtMv8h3cxkyBrHPIy                           



                          MFSxiSWUZDIqN2FjiFlj                           



                          Ps0jnGv5oRhzYstfSHR7                           



                          Piy7IE5vix08jol1zLbw                           



                          MKPytcmqLxU5KCveFFFy                           



                          ahdnMJq2INoeCKTiiEcj                           



                          MFxtYXJncjcyMFxtYXJn                           



                          mSN5PYLpwCSoX3LdLVJe                           



                          ZNtwQMQnehx0YqYjWd0y                           



                          cXBwfMlvSXxot4tlq1co                           



                          dOUiBwz6QLUzDaRkDarx                           



                          CQroe9Nsx2hzHELetx1v                           



                          GCY7wPAciFyad9H6iVTw                           



                          XIHikRUvsyXdOAXxbl74                           



                          lBFivUUxtTKjoe3apsWx                           



                          uDTbcTKhWVD0bUHffnKl                           



                          FNPhqJSeBNKdKL4opSIb                           



                          WOWtuW3kixpiWKAdGyJf                           



                          sfupSBNxoVejdaNiFl7k                           



                          dXcyPBF3VClpD5fryP9u                           



                          KlM6EOprM4outQ4pGDd7                           



                          MNfimHP2BCHhhP5dBV3v                           



                          uyewl1ohCcOwIW7tjekw                           



                          y6tiVsEdZA9ymoo5p9rb                           



                          CPT5DQkdXLLhCxW3hnJ0                           



                          NDBcaGVhZGVyeTcyMFxm                           



                          k532KSS4SvEySEJhs4Bz                           



                          W7OvvRqdV19bdAfwH34g                           



                          QWOgxXaefB1clJfzuY0u                           



                          WeAdRmTeTDk2kl12WNg2                           



                          btobtEhtUCm0mlOwKXFu                           



                          OBQ3AGEnoYOaSPDzS1t3                           



                          nnZtXWAlREV0ARVhhNEp                           



                          BSFhY8x2mnCpGJN0MVu1                           



                          cnBhZGRmdDNcdHJwYWRk                           



                          YjBcdHJwYWRkZmIzXHRy                           



                          lLXpvUCdpLTeiB9gvVep                           



                          XDZziJCijR5tRJA5GBKw                           



                          cmgzMjBcdHJoZHJcbHRy                           



                          ib76XTIszzUmwILkeCmn                           



                          oWVvEXX6GEYaGUOgRQJs                           



                          NDZ4JRFjCrTtpxJmKOfe                           



                          bGJyZHJiXGJyZHJzXGJy                           



                          VKC3EHOmCoOgyuUuWVus                           



                          bGJyZHJsXGJyZHJzXGJy                           



                          XBV3QHMwTrSnkgSqHIwl                           



                          bGJyZHJyXGJyZHJzXGJy                           



                          RRX8NONvOlMffhLpNBsz                           



                          bHBhZHQxMFxjbHBhZGZ0                           



                          P7gqzUEeARBgEVtfjYMn                           



                          DAJnU3lnhQDxBKaaDMGh                           



                          cGFkZmwzXGNscGFkYjBc                           



                          K7qgUGApMsRkN7OiaQd0                           



                          MDAwXGNsdmVydGFsdFxj                           



                          jQIlGWU4RMKfLSLaKMWs                           



                          HAJ2LXEeIfJxhsLdFYaq                           



                          bGJyZHJiXGJyZHJzXGJy                           



                          OUY3EZPuIfXsrxZgPRwt                           



                          bGJyZHJsXGJyZHJzXGJy                           



                          TGA1VYVjJzKrsgUiQWyd                           



                          bGJyZHJyXGJyZHJzXGJy                           



                          SSF9DZIvQcFmgwDlCDgv                           



                          bHBhZHQxMFxjbHBhZGZ0                           



                          J7rltNGmJBHuQLippPHe                           



                          ONTbF9lazOHnYNouDOGk                           



                          cGFkZmwzXGNscGFkYjBc                           



                          K8xgFZGdDmNvJ0NzrXs2                           



                          NjAwXGNsdmVydGFsdFxj                           



                          gFLwFPX1TOMrJQBbBBRu                           



                          DWS1GLPsSdGfruAaJNak                           



                          bGJyZHJiXGJyZHJzXGJy                           



                          YZZ1BVEsYtXqawBtHFqd                           



                          bGJyZHJsXGJyZHJzXGJy                           



                          JJY4BLSpIlDfzbJqKLke                           



                          bGJyZHJyXGJyZHJzXGJy                           



                          YJW2OUQtCyBxehSbIYbh                           



                          bHBhZHQxMFxjbHBhZGZ0                           



                          B8jkqNBzHJZyLRvmxNCj                           



                          CMLdJ0jgxBIbKQksRPPv                           



                          cGFkZmwzXGNscGFkYjBc                           



                          F2egNIKlOuNkQ7RpxAp0                           



                          KpKxWTYtbvLghY39Kupj                           



                          c0SkGNNgBAT4ELouNPew                           



                          bFxwbGFpblxmMVxmczIw                           



                          THztcfqoLRWaVIuzN6ht                           



                          QnPjERHxkDtcVQsac4Jl                           



                          XGYxXGNmMlxmczIwXGIg                           



                          BGJiOHLnyT9yCfmjH3Fd                           



                          lC5pWLpcFkroI8vuVDEv                           



                          e1IwhP5rAGignIXwynbg                           



                          MVxmczIwXGxhbmcxMDMz                           



                          AOywS0lxJiTnBFAhqCgy                           



                          INcou4QmIGRhMEHyQbpb                           



                          dkHcPYi8gaJsTJKrcIby                           



                          kKXbUSgjssUrlKucg9Uu                           



                          jnKrqGhoVTMaFRh7iuQp                           



                          qzadlWn5aTYqqUbrEFQd                           



                          nAxnsU2sQqJfIaPuSNrr                           



                          bGFpblxmMVxmczIwXGxh                           



                          epvyMHSwTGhuE8hiAaBy                           



                          TVQxsXmuIIyny4PwIHSq                           



                          JTQuXzqtefExHDMkS86x                           



                          bGVjdGVkXHBsYWluXGYx                           



                          XGZzMjBcbGFuZzEwMzNc                           



                          aGljaFxmMVxkYmNoXGYx                           



                          XBvzB6znFaUpO0IzAPMw                           



                          GbOfqCQhW0mqU2UbaInu                           



                          YXJkXGludGJsXHNzcGFy                           



                          XRQ6mSNnxqTqbDWsgOKy                           



                          LTYtUVycYPX3iXVrtevc                           



                          dBYluzeqFXrmtjR5SWNe                           



                          YWluXGYxXGZzMjBcbGFu                           



                          ZzEwMzNcaGljaFxmMVxk                           



                          OgTyNXGaLWjhF5vvNiAz                           



                          C1OnALQsZnJtDbTHFOCx                           



                          aXZlZFxwbGFpblxmMVxm                           



                          czIwXGxhbmcxMDMzXGhp                           



                          K0aqKqEbWRShhXmdQUxo                           



                          x9DqWAFrTORcEvwlpdDh                           



                          MFz5ttFjDQQgwVudiP06                           



                          Hcmgqo12FWAcg4ekQXCs                           



                          E4EviSMoTIQzpWIjSRsi                           



                          MDhcdHJwYWRkZmwzXHRy                           



                          cGFkZHIxMDhcdHJwYWRk                           



                          ZnIzXHRycGFkZHQwXHRy                           



                          qZObHCJ1D1c4xiTvUTYv                           



                          WBd0csXeTQUhBfGbtOIe                           



                          ZNX2ZZd5IxqwhaE5hUHs                           



                          U4k1UzmommUcCMdpvKEd                           



                          fj94FWFzoyNhcXIigXzp                           



                          eZDlFGM1XMRmFMXjOGTs                           



                          CYH5EMXbWyAvgbVzBMzs                           



                          bGJyZHJiXGJyZHJzXGJy                           



                          FQF7FVPaRgVcywLaFZlz                           



                          bGJyZHJsXGJyZHJzXGJy                           



                          JCW4JXZkYbMikiMcSHho                           



                          bGJyZHJyXGJyZHJzXGJy                           



                          NII9OPFxNfDrkvPqAKqp                           



                          bHBhZHQxMFxjbHBhZGZ0                           



                          O3ierIKdZDJfIQyfvHKw                           



                          HMIvM9antRXxNHquAFLh                           



                          cGFkZmwzXGNscGFkYjBc                           



                          C6xaWQQcIwKiC6BewKi1                           



                          MDAwXGNsdmVydGFsdFxj                           



                          aJTiSJG4QDLeBYYjPKCs                           



                          SQT3GMKaCsHfeaUpGDpx                           



                          bGJyZHJiXGJyZHJzXGJy                           



                          PJE3ZOKqEjXdztDkTRuc                           



                          bGJyZHJsXGJyZHJzXGJy                           



                          ZUE4FUPeHhVfuqZmBJxy                           



                          bGJyZHJyXGJyZHJzXGJy                           



                          DGO0OQVxZfRjvjWgGQnv                           



                          bHBhZHQxMFxjbHBhZGZ0                           



                          A7qucOWpSWHpWAstxWHq                           



                          OKVeL1cglDIhEDvmLNPr                           



                          cGFkZmwzXGNscGFkYjBc                           



                          F4fvHUTgBbIwF7VoqHq4                           



                          NjAwXGNsdmVydGFsdFxj                           



                          iEJjXSI7NQLtAMVxJSUi                           



                          FTP7GGZkGfAxyzNyZSzh                           



                          bGJyZHJiXGJyZHJzXGJy                           



                          MGU7VXTeFoFmgjFkKAud                           



                          bGJyZHJsXGJyZHJzXGJy                           



                          UWV2CZKeClCmunCwJHno                           



                          bGJyZHJyXGJyZHJzXGJy                           



                          IXG7KLVrPxZduhSuZGoa                           



                          bHBhZHQxMFxjbHBhZGZ0                           



                          X7rvtRWwPZStTChifZSp                           



                          MEBgE5pneNGiPSoyPBRr                           



                          cGFkZmwzXGNscGFkYjBc                           



                          U3pmFHPqGdIfN9BkiRg9                           



                          QtCbVJXipiYdvW30Uqsp                           



                          q4AjLVCrOIA4UWulYNvo                           



                          bFxwbGFpblxmMFxmczI0                           



                          XHBsYWluXGYxXGZzMjBc                           



                          bGFuZzEwMzNcaGljaFxm                           



                          WGtaQoXdKOObWFcxY9vp                           



                          LhJsX9OeUZFbJfOmRR0j                           



                          LcY6RWBhLuY3KNH8YXIU                           



                          SOXiCUFOP1ETXTPvSZEP                           



                          H0rnjUHiqzveDHjfqvZk                           



                          VDwbssmdKOXdJWvoW3dl                           



                          IiEcZMOgwAfqLHcdr7Lk                           



                          XGYxXGNmMlxmczIwXGx0                           



                          cmNoXGNlbGxccGFyZFxp                           



                          byGecYruk0OdfzLvcLwp                           



                          XHMwXHFsXHBsYWluXGYw                           



                          VSZcIhZzjDmcmZ7mFwBi                           



                          DxYhVEmhJG4iFWHeP8tl                           



                          kNRiWIRcJZZzE8oeYjKf                           



                          mX4lsQvvPVdaLwWiVwUo                           



                          ICGyAS9cAd9fNJWrNPKz                           



                          YWluXGYxXGZzMjBcbGFu                           



                          ZzEwMzNcaGljaFxmMVxk                           



                          TcJyVHUbPZvjS8lxLtIq                           



                          U5HhGNUxZbXdxOGnQ8ir                           



                          K1RgrMdoDEHqHTcxiHJt                           



                          MUHkvCFsJRK1aIMvinLs                           



                          fIhqgLnmfI8uRkQlVrBm                           



                          NFxwbGFpblxmMVxmczIw                           



                          RUgzblceCREsXOpyB7wj                           



                          SwGlQKHqvDtpLWnfl7Zi                           



                          XGYxXGNmMlxmczIwIDEw                           



                          EhU3UhCbXhMzoRywdP2q                           



                          KvLoKzWuHVpiSK9lRXRw                           



                          L8umaQKjDNCnWQBxG9ml                           



                          KgLqbK9qeQcnABpvVkAi                           



                          ZnMyMFxsdHJjaFxjZWxs                           



                          RKyjiIIlYYHxf0dyAGLt                           



                          FMKhrAKwQOL2vPBrrsDa                           



                          tGsgzKsakM9eTrDqWcEm                           



                          NFxwbGFpblxmMVxmczIw                           



                          XNemuxwcTYFwCDivW1lo                           



                          CjVhEHSlqDyvJChci3Rw                           



                          XGYxXGZzMjBccGFyfQ==                           

 

             Diagnosis (test code = p2fpaJMhZQXjtFF9GCLr                        

   



             34)          LSBrs6nod9NvaMUjdSGd                           



                          EFeepPFnxtBvms63sEK4                           



                          xO77FG7sTUUfUxB8XRFt                           



                          nrF2Vxg6QZXdILOyvTSk                           



                          Q077a9cmk0ckrpGkhAV6                           



                          fVxwYXJkXHBsYWluXGZz                           



                          NuUpJo73fhEeqNWwlYXh                           



                          DHSuvSNhtjWrNaY8GYGw                           



                          CkS9QZVlJO4iSn3rFUJm                           



                          KXAmWNPwX70nyQElCVQq                           



                          YBKvmEybk9Q7KLBkgkfj                           



                          YXJccGFyXHRhYiBDZXJ2                           



                          qHloTHMqx4IrxAAkUXs6                           



                          XHBhclxwYXJcdGFiXHRh                           



                          YaCJMkZTP7eIXXLhZA6H                           



                          UkxZIERJRkZFUkVOVElB                           



                          ISKMCOXGFTMJHA9LIpVS                           



                          UMjYULXSFsWVMi3XSUMY                           



                          TkZJTFRSQVRJTkcgVEhF                           



                          AWOLPXkHEVRZAJkOJ62R                           



                          I3XcH4VdJKcBKBZRT7PZ                           



                          OVnaOcGUWJ0hmIFcZFGk                           



                          ozc0QZIxLC0yf92coTXo                           



                          vM4tCYQ9szI6kFohV9Q3                           



                          XHBhclxwYXJcbGkxNDQw                           



                          CLhrfyG8WKBsHTPKTVdV                           



                          JPesXS6QK79FRWSEDQqj                           



                          DWeJTNNGMLLBXAKnS5lU                           



                          SCBQQVBJTExBUlkgRkVB                           



                          JDTENWAnDVDVB8CUZWul                           



                          Of0ODBYLYS4LPL4WIAQF                           



                          FC3BM7DUX1wSV52XANrE                           



                          QURFIDEsIElOIEEgQkFD                           



                          Z4eKI9ADPKFMWaQHECVB                           



                          REYMUtkgVS0VA91RVRBO                           



                          ND8kVJRslakcgNTtmLrx                           



                          MFxwYXJcdGFiXHRhYlxw                           



                          YXJccGFyfQ==                           

 

             Disclaimer (test code = f6bgjNRoEIYjlYZrPgNt                       

    



             9844)        QXAbDWIzd9iaUTVbnZTu                           



                          ZzEwMzNcZnRuYmpcdWMx                           



                          WHPxLoHni0opa038yPXf                           



                          c0gxGUSoNoQ6gVJmBTKm                           



                          lCQbG187NUMlVLskj4ce                           



                          q0LiAMMblTSuc0Y8TRVF                           



                          coaqqLj5qCsvP99wm0P8                           



                          ByuvV1euSIPaOMZbB5Ys                           



                          BC1nOWKiAag8GPI8RZU1                           



                          CXNeERKtH3IrWM2cCXJw                           



                          eDCsCTh7c5dmzEpxMDIp                           



                          WVQ6m1roTUtlfcPiWU4u                           



                          qe3niAu3a0vkrcBgTMXi                           



                          YRZhqAZOKZTrQ6JlcUiw                           



                          Rr3leFj2uKciOyfqSQH8                           



                          Etl1GJ0dvh05alv6aFaf                           



                          SCJglyaoDyZ6OCsnPHHz                           



                          xggkNUv7KGbpLNTphEL1                           



                          RPGhsVEtB2ReIKIwZG0f                           



                          gun9GWF0XQprWPQhNnP2                           



                          NDBcaGVhZGVyeTcyMFxm                           



                          r868CEK9DvKtYW8iD7Fp                           



                          h9I1oG9aiUUiMUUltHSp                           



                          SfWiVUSdas5xlGFfFKxy                           



                          d9NhWZS8dzV2cKNzqOWr                           



                          PVWrVA32Nyqzf9FwAkcq                           



                          AGG0YUJogsOuw2Kwc0np                           



                          MeNchjZiY7suQ6LgMSAd                           



                          TYVxEBLkZmJxbjRpl4Wv                           



                          h4BcqCVaqVm0j0hqWPUh                           



                          WOWzrWkbr8cxHHR2OZWc                           



                          D2U6sNTui2erBMqhVLHp                           



                          uCV5ezB1DMWspNNnH1Rm                           



                          pH7tEJWmAP9oeow4z7do                           



                          VZN2PUfgLUDjUrD8idN6                           



                          NDBcaGVhZGVyeTcyMFxm                           



                          d944OIK1CgMxPEQbt7Bs                           



                          A1FabSeoB71ohVtlZ70t                           



                          GBLehTglhV8mbEjsuP6j                           



                          ZjBcZnMyNFxxbFxwbGFp                           



                          tozuFPokiiK1VEpaoalk                           



                          XGBuBYhuE2haEiAxBDFg                           



                          kFtdKNnjt4JtWDMfEMJs                           



                          UfveqlC1LNQLg01bGLSu                           



                          r8KqMOUvbW4vxCGoGSnw                           



                          jgTgaCC5KVmerwGjCvZy                           



                          fpPmIYBsaK1uEOVaAY0j                           



                          HNShyeCtoi0srtRkIPCv                           



                          NYAyH5FqryymhHnknyAb                           



                          ULFrof6ynyZeGJO5CEBJ                           



                          BE8HPNQyWHYwt37hBXFf                           



                          uGjgoS3yvTJttiKbIOHa                           



                          e7ApvD9zeKRSKPVmM8ot                           



                          AD2qNYckj9JidAQkfYPe                           



                          rZU1JAJvu0KgEjBzkiYq                           



                          eQWnbVLiU9XbjFmdO1nw                           



                          KBTrRNVoslHmaFBzl7Ir                           



                          JJZqpXH8sAXwDH3EExVU                           



                          z38tLGYyNYOEypQrGHTu                           



                          vWarbVO9lzH0kU6uLtAM                           



                          ZiBhcHBsaWNhYmxlLCBj                           



                          l431rs5wclU5PDHeUQRo                           



                          fhbcp7UpZWQbSICwuK89                           



                          WJJdCTXzml9tbkzcxBOc                           



                          gnPuX9Dmvbr2rH3kDFNc                           



                          YWluXGYxXGZzMjJcbGFu                           



                          ZzEwMzNcaGljaFxmMVxk                           



                          KdAdDBVgZVblI5swVtUj                           



                          ZnMyMlxwYXJ9                           



MD BlackwellOSI US Pelvic2020-10-28 16:48:22Study acquired at another 
institution. For comparison only. No MD Blackwell originated interpretation
requested or available.MD BlackwellCT Chest with Contrast2020-10-28 16:15:47*  
Small bilateral pulmonary nodules are nonspecific and can be followed.*  
Multiple small low-density nodules in the thyroid. Further evaluation with 
ultrasound can be performed if not previously evaluated. I personally reviewed 
these image(s) along with the resident's/fellow's interpretations, certify that 
if a procedure was performed I was physically present, and agree with the final 
report.Interface, Radiology Results In - 10/28/2020 11:17 AM CDTFormatting of 
this note might be different from the original.FULL RESULT:Examination: CT CHEST
W CONTRAST, 10/28/2020 7:24 AMClinical History: 49-year-old female with stage IB
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
tion: Initial stagingComparison: NoneTechnique: CT of the chest was performed 
using intravenous contrast.FINDINGS: Lines and Tubes: NoneLungs and Airways: 
There is a 5 mm subpleural nodule in the rightupper lobe (3/30). Additional 
small (3mm or less) pulmonary nodules are seen series 3, images 53, 74, 87. No 
focal consolidations or evidence of pulmonary edema. The central airways are 
patent.Pleura: No pleural effusion. No pneumothorax.Cardiovascular: The 
pulmonary arteries are unremarkable. The heart is normal in size. There is no 
pericardial effusion. There are no aortic arch or coronary artery ca
lcifications.Lymph Nodes: No enlarged supraclavicular, axillary, mediastinal, or
hilar lymph nodes.Other Mediastinum: Multinodular right thyroid lobe (largest 
nodule measures 0.9 cm on series 2, image 11).Bones and Soft Tissues: No 
aggressive osseous lytic or blastic lesions.Upper Abdomen: The visualized liver,
gallbladder, spleen, and adrenal glands are within normal limits.IMPRESSION:*  
Small bilateral pulmonary nodules are nonspecific and can be followed.*  
Multiple small low-density nodules in the thyroid. Further evaluation with 
ultrasound can be performed if not previously evaluated.I personally reviewed 
these image(s) along with the resident's/fellow's interpretations, certify that 
if a procedure was performed I was physically present, and agree with the final 
report.MD BlackwellPREGNANCY URINE MONOCLONAL**FB**2018 04:58:00





             Test Item    Value        Reference Range Interpretation Comments

 

             PREG UR (test code = PGU) NEGATIVE     NEGATIVE                  



PREGNANCY URINE MONOCLONAL**FB**2018 05:47:00





             Test Item    Value        Reference Range Interpretation Comments

 

             PREG UR (test code = PGU) NEGATIVE     NEGATIVE                  



PREGNANCY URINE MONOCLONAL**FB**2018 04:51:00





             Test Item    Value        Reference Range Interpretation Comments

 

             PREG UR (test code = PGU) NEGATIVE     NEGATIVE                  



PREGNANCY URINE MONOCLONAL**FB**2018 04:50:00





             Test Item    Value        Reference Range Interpretation Comments

 

             PREG UR (test code = PGU) NEGATIVE     NEGATIVE

## 2021-09-14 NOTE — RAD REPORT
EXAM DESCRIPTION:  CT - Chest For Pe Angio - 9/14/2021 8:26 pm

 

CLINICAL HISTORY:  Chest pain;Dyspnea

 

COMPARISON:  No comparisonsNo comparisons

 

FINDINGS:  Chest Wall: Multinodular thyroid.

Lungs: Mild peripheral ground-glass opacities bilaterally.

Pleura: No significant effusions or pneumothorax.

Mediastinum/dotty: No pathologic lymphadenopathy.

Pulmonary arteries/Aorta: No filling defect identified. No aortic aneurysm.

Heart: No significant pericardial effusion. Normal heart size.

Upper abdomen: No acute abnormality.

Bones: No acute abnormality.

 

All CT scans are performed using dose optimization technique as appropriate and may include automated
 exposure control or mA/KV adjustment according to patient size.

 

IMPRESSION:  Negative for pulmonary embolism. Mild peripheral ground-glass opacities concerning for m
ultifocal pneumonia such as Covid-19.

 

Multinodular thyroid which can be further evaluated with ultrasound if clinically indicated.

## 2021-09-15 VITALS — OXYGEN SATURATION: 100 % | SYSTOLIC BLOOD PRESSURE: 136 MMHG | TEMPERATURE: 98 F | DIASTOLIC BLOOD PRESSURE: 78 MMHG

## 2021-09-15 NOTE — EDPHYS
Physician Documentation                                                                           

 [facility]                                                                 

Name: Maricel Varner                                                                              

Age: 50 yrs                                                                                       

Sex: Female                                                                                       

: 1970                                                                                   

MRN: [id number]                                                                                   

Arrival Date: 2021                                                                          

Time: 18:23                                                                                       

Account#: [id number]                                                                            

Bed 23                                                                                            

Private MD:                                                                                       

[facility] Physician Gilberto Blackwell                                                                      

HPI:                                                                                              

                                                                                             

19:31 This 50 yrs old  Female presents to ER via Ambulatory with complaints of       harvey 

      Weakness, Decreased Appetite.                                                               

19:31 The patient presents to the emergency department with weakness of the. Onset: The       harvey 

      symptoms/episode began/occurred 2 week(s) ago. Context: occurred at an unknown              

      location. Associated signs and symptoms: Pertinent positives: dizziness, nausea,            

      weakness. Severity of symptoms: At their worst the symptoms were mild in the emergency      

      department the symptoms are unchanged. Patient's baseline: Neuro: alert and fully           

      oriented. The patient has not experienced similar symptoms in the past.                     

                                                                                                  

Historical:                                                                                       

- Allergies:                                                                                      

18:41 No Known Allergies;                                                                     iw  

- Home Meds:                                                                                      

18:42 Buspirone Oral [Active]; Trintellix oral [Active]; hormone [Active];                    iw  

- PMHx:                                                                                           

18:41 Anxiety; cervical/uterine cancer; Depression; Hepatitis; Hypertension;                  iw  

- PSHx:                                                                                           

18:41  section; bunion removed; mass removed from right breast;                       iw  

                                                                                                  

- Immunization history:: Client reports having NOT received the Covid vaccine.                    

- Social history:: Smoking status: Patient denies any tobacco usage or history of.                

- Family history:: not pertinent.                                                                 

                                                                                                  

                                                                                                  

ROS:                                                                                              

19:31 Constitutional: Negative for fever, chills, and weight loss, Eyes: Negative for injury, harvey 

      pain, redness, and discharge, ENT: Negative for injury, pain, and discharge, Neck:          

      Negative for injury, pain, and swelling, Cardiovascular: Negative for chest pain,           

      palpitations, and edema, Back: Negative for injury and pain, : Negative for injury,       

      bleeding, discharge, and swelling, MS/Extremity: Negative for injury and deformity,         

      Skin: Negative for injury, rash, and discoloration, Psych: Negative for depression,         

      anxiety, suicide ideation, homicidal ideation, and hallucinations, Allergy/Immunology:      

      Negative for hives, rash, and allergies, Endocrine: Negative for neck swelling,             

      polydipsia, polyuria, polyphagia, and marked weight changes, Hematologic/Lymphatic:         

      Negative for swollen nodes, abnormal bleeding, and unusual bruising.                        

19:31 Respiratory: Positive for cough, "sounds productive".                                       

19:31 Abdomen/GI: Positive for nausea and vomiting, anorexia.                                     

                                                                                                  

Exam:                                                                                             

19:31 Constitutional:  This is a well developed, well nourished patient who is awake, alert,  harvey 

      and in no acute distress. Head/Face:  Normocephalic, atraumatic. Eyes:  Pupils equal        

      round and reactive to light, extra-ocular motions intact.  Lids and lashes normal.          

      Conjunctiva and sclera are non-icteric and not injected.  Cornea within normal limits.      

      Periorbital areas with no swelling, redness, or edema. ENT:  Nares patent. No nasal         

      discharge, no septal abnormalities noted.  Tympanic membranes are normal and external       

      auditory canals are clear.  Oropharynx with no redness, swelling, or masses, exudates,      

      or evidence of obstruction, uvula midline.  Mucous membranes moist. Neck:  Trachea          

      midline, no thyromegaly or masses palpated, and no cervical lymphadenopathy.  Supple,       

      full range of motion without nuchal rigidity, or vertebral point tenderness.  No            

      Meningismus. Chest/axilla:  Normal chest wall appearance and motion.  Nontender with no     

      deformity.  No lesions are appreciated. Cardiovascular:  Regular rate and rhythm with a     

      normal S1 and S2.  No gallops, murmurs, or rubs.  Normal PMI, no JVD.  No pulse             

      deficits. Abdomen/GI:  Soft, non-tender, with normal bowel sounds.  No distension or        

      tympany.  No guarding or rebound.  No evidence of tenderness throughout. Back:  No          

      spinal tenderness.  No costovertebral tenderness.  Full range of motion. Skin:  Warm,       

      dry with normal turgor.  Normal color with no rashes, no lesions, and no evidence of        

      cellulitis. MS/ Extremity:  Pulses equal, no cyanosis.  Neurovascular intact.  Full,        

      normal range of motion. Neuro:  Awake and alert, GCS 15, oriented to person, place,         

      time, and situation.  Cranial nerves II-XII grossly intact.  Motor strength 5/5 in all      

      extremities.  Sensory grossly intact.  Cerebellar exam normal.  Normal gait. Psych:         

      Awake, alert, with orientation to person, place and time.  Behavior, mood, and affect       

      are within normal limits.                                                                   

19:31 Respiratory: the patient does not display signs of respiratory distress,  Respirations:     

      normal, Breath sounds: decreased breath sounds, that are mild, rhonchi, that are mild,      

      stridor, is not appreciated, Respiratory rate:  18                                          

19:55 ECG was reviewed by the Attending Physician.                                            [facility] 

                                                                                                  

Vital Signs:                                                                                      

18:38  / 81; Pulse 121; Resp 18 S; Pulse Ox 97% on R/A; Weight 86.18 kg; Height 5 ft. 7 iw  

      in. (170.18 cm);                                                                            

22:17  / 78; Pulse 94; Resp 20; Temp 98; Pulse Ox 100% ;                                wr  

18:38 Body Mass Index 29.76 (86.18 kg, 170.18 cm)                                             iw  

                                                                                                  

MDM:                                                                                              

18:37 Patient medically screened.                                                             [facility] 

19:34 Data reviewed: vital signs, nurses notes, lab test result(s), EKG, radiologic studies,  harvey 

      plain films. Data interpreted: Pulse oximetry: on room air is 97 %. Test                    

      interpretation: by ED physician or midlevel provider: ECG, plain radiologic studies.        

      Counseling: I had a detailed discussion with the patient and/or guardian regarding: the     

      historical points, exam findings, and any diagnostic results supporting the                 

      discharge/admit diagnosis, lab results, radiology results.                                  

09/15                                                                                             

00:34 Counseling: I had a detailed discussion with the patient and/or guardian regarding: the pm1 

      historical points, exam findings, and any diagnostic results supporting the                 

      discharge/admit diagnosis, lab results, radiology results, the need for outpatient          

      follow up, to return to the emergency department if symptoms worsen or persist or if        

      there are any questions or concerns that arise at home.                                     

00:47 ED course: Patient offered Regeneron due to history of cervical CA and obesity.         pm1 

      Informed patient today would be the last day it would be an option for her due to the       

      10-day onset of symptoms. Patient decided that she did not want to take the medication.     

      Patient's current symptoms are fatigue and malaise decreased appetite.                      

                                                                                                  

                                                                                             

18:39 Order name: Basic Metabolic Panel; Complete Time: 20:39                                 [facility] 

                                                                                             

18:39 Order name: CBC with Diff; Complete Time: 20:39                                         [facility] 

                                                                                             

18:39 Order name: LFT's; Complete Time: 20:39                                                 [facility] 

                                                                                             

18:39 Order name: Magnesium; Complete Time: 20:39                                             [facility] 

                                                                                             

18:39 Order name: NT PRO-BNP; Complete Time: 20:39                                            [facility] 

                                                                                             

18:39 Order name: PT-INR; Complete Time: 20:01                                                [facility] 

                                                                                             

18:39 Order name: Troponin (emerg Dept Use Only); Complete Time: 20:39                        [facility] 

                                                                                             

19:36 Order name: Blood Culture Adult (2)                                                                                                                                                  

19:38 Order name: CRP; Complete Time: 22:44                                                   [facility] 

                                                                                             

19:38 Order name: Ferritin; Complete Time: 22:44                                              [facility] 

                                                                                             

20:45 Order name: SARS-COV-2 RT PCR; Complete Time: 20:49                                     [facility]

                                                                                             

23:16 Order name: Troponin (emerg Dept Use Only)                                              pm1 

                                                                                             

18:39 Order name: XRAY Chest (1 view); Complete Time: 20:01                                   [facility] 

                                                                                             

18:39 Order name: EKG; Complete Time: 18:40                                                   [facility] 

                                                                                             

18:39 Order name: Cardiac monitoring; Complete Time: 20:29                                    [facility] 

                                                                                             

18:39 Order name: EKG - Nurse/Tech; Complete Time: 20:29                                      [facility] 

                                                                                             

18:39 Order name: IV Saline Lock; Complete Time: 19:32                                        [facility] 

                                                                                             

18:39 Order name: Labs collected and sent; Complete Time: 19:32                               [facility] 

                                                                                             

20:03 Order name: CT Chest For PE Angio; Complete Time: 20:49                                 [facility] 

                                                                                             

23:16 Order name: EKG; Complete Time: 23:17                                                   pm                                                                                             

23:17 Order name: Troponin (Emerg Dept Use Only); Complete Time: 00:34                        [facility]

                                                                                             

18:39 Order name: O2 Per Protocol; Complete Time: 19:32                                       [facility] 

                                                                                             

18:39 Order name: O2 Sat Monitoring; Complete Time: 19:32                                     [facility] 

                                                                                             

19:36 Order name: Urine Dipstick-Ancillary (obtain specimen)                                  [facility] 

                                                                                             

20:53 Order name: PO challenge; Complete Time: 00:09                                          pm1 

                                                                                             

23:16 Order name: EKG - Nurse/Tech; Complete Time: 23:42                                      pm1 

                                                                                                  

EC/14                                                                                             

19:55 Rate is 101 beats/min. Rhythm is regular. QRS Axis is Normal. FL interval is normal.    harvey 

      QRS interval is normal. QT interval is normal. No Q waves. T waves are Normal. ST           

      Segment is depressed in leads II, III, aVF, V3, V4, V5, V6. Clinical impression: Sinus      

      tachycardia. Interpreted by me. Reviewed by me.                                             

                                                                                                  

Administered Medications:                                                                         

20:21 Drug: NS 0.9% 1000 ml Route: IV; Rate: 1 bolus; Site: left antecubital;                 lh3 

20:21 Drug: Pepcid (famotidine) 20 mg Route: IVP; Site: left antecubital;                     lh3 

20:21 Drug: Rocephin (cefTRIAXone) 1 grams Route: IV; Rate: per protocol; Site: left          lh3 

      antecubital;                                                                                

21:30 Drug: Zofran (Ondansetron) 4 mg Route: IVP; Site: left antecubital;                     wr  

21:31 Drug: Aspirin Chewable Tablet 162 mg Route: PO;                                         wr  

21:36 Drug: Zithromax (azithromycin) 500 mg Route: IVPB; Infused Over: 1 hrs; Site: left      wr  

      antecubital;                                                                                

22:01 Drug: Lovenox (enoxaparin) 1 mg/kg Route: Sub-Q; Site: right lower abdomen;             wr  

                                                                                                  

                                                                                                  

Disposition:                                                                                      

09/15                                                                                             

06:41 Co-signature as Attending Physician, Gilberto Blackwell MD I agree with the assessment and  harvey 

      plan of care.                                                                               

                                                                                                  

Disposition Summary:                                                                              

09/15/21 00:35                                                                                    

Discharge Ordered                                                                                 

      Location: Home                                                                          pm1 

      Problem: new                                                                            pm1 

      Symptoms: have improved                                                                 pm1 

      Condition: Stable                                                                       pm1 

      Diagnosis                                                                                   

        - Dehydration                                                                         pm1 

        - Pneumonia due to SARS-associated coronavirus                                        pm1 

      Followup:                                                                               pm1 

        - With: Emergency Department                                                               

        - When: As needed                                                                          

        - Reason: Worsening of condition                                                           

      Followup:                                                                               pm1 

        - With: Private Physician                                                                  

        - When: 2 - 3 days                                                                         

        - Reason: Recheck today's complaints, Continuance of care, Re-evaluation by your           

      physician                                                                                   

      Discharge Instructions:                                                                     

        - Discharge Summary Sheet                                                             pm1 

        - Dehydration, Adult                                                                  pm1 

        - COVID-19                                                                            pm1 

        - COVID-19 Frequently Asked Questions                                                 pm1 

        - 10 Things You Can Do to Manage Your COVID-19 Symptoms at Home - [facility]                 pm1 

        - Rehydration, Adult                                                                  pm1 

        - COVID-19: Quarantine vs. Isolation - [facility]                                            pm1 

      Forms:                                                                                      

        - Medication Reconciliation Form                                                      pm1 

        - Thank You Letter                                                                    pm1 

        - Antibiotic Education                                                                pm1 

        - Prescription Opioid Use                                                             pm1 

      Prescriptions:                                                                              

        - ondansetron 4 mg Oral tablet,disintegrating                                              

            - place 1 tablet by TRANSLINGUAL route every 8 hours As needed; 12 tablet;        pm1 

      Refills: 0, Product Selection Permitted                                                     

Signatures:                                                                                       

Dispatcher [employer]                           [facility]                                                 

Gilberto Blackwell MD MD cha Williams, Irene, RN                     RN   iw                                                   

Micky Nava, NP                    NP   pm1                                                  

Hemalatha Dunham RN                     RN   lh3                                                  

Giorgi Klein                                                   

                                                                                                  

Corrections: (The following items were deleted from the chart)                                    

                                                                                             

19:36 18:40 CORONAVIRUS+MR.LAB.BRZ ordered. [facility]

09/15                                                                                             

00:45 00:35 Coronavirus infection, unspecified pm1                                            pm1 

                                                                                                  

**************************************************************************************************

## 2021-09-15 NOTE — EKG
Test Date:    2021-09-14               Test Time:    19:53:21

Technician:   RADHA                                     

                                                     

MEASUREMENT RESULTS:                                       

Intervals:                                           

Rate:         101                                    

MA:           172                                    

QRSD:         82                                     

QT:           366                                    

QTc:          474                                    

Axis:                                                

P:            29                                     

MA:           172                                    

QRS:          34                                     

T:            -5                                     

                                                     

INTERPRETIVE STATEMENTS:                                       

                                                     

Sinus tachycardia

T wave abnormality, consider inferior ischemia

Abnormal ECG

Compared to ECG 03/15/2018 07:54:18

T-wave abnormality now present

Possible ischemia now present

Sinus rhythm no longer present

Sinus arrhythmia no longer present



Electronically Signed On 09-15-21 08:14:13 CDT by Aquilino Oden

## 2021-09-15 NOTE — ER
Nurse's Notes                                                                                     

 [facility]                                                                 

Name: Maricel Varner                                                                              

Age: 50 yrs                                                                                       

Sex: Female                                                                                       

: 1970                                                                                   

MRN: [id number]                                                                                   

Arrival Date: 2021                                                                          

Time: 18:23                                                                                       

Account#: [id number]                                                                            

Bed 23                                                                                            

Private MD:                                                                                       

Diagnosis: Dehydration;Pneumonia due to SARS-associated coronavirus                               

                                                                                                  

Presentation:                                                                                     

                                                                                             

18:38 Chief complaint: Patient states: fatigue, no appetite, dizziness on standing , no       iw  

      fever, denies vomiting or diarrhea , symptoms X 1 week, hx of cervical cancer, last         

      chemo was 2021. Coronavirus screen: Client presents with at least one sign or           

      symptom that may indicate coronavirus-19. Ebola Screen: Patient negative for fever          

      greater than or equal to 101.5 degrees Fahrenheit, and additional compatible Ebola          

      Virus Disease symptoms Patient denies exposure to infectious person. Patient denies         

      travel to an Ebola-affected area in the 21 days before illness onset. No symptoms or        

      risks identified at this time. Initial Sepsis Screen: Does the patient meet any 2           

      criteria? No. Patient's initial sepsis screen is negative. Does the patient have a          

      suspected source of infection?. Risk Assessment: Do you want to hurt yourself or            

      someone else? Patient reports no desire to harm self or others. Onset of symptoms was       

      2021.                                                                         

18:38 Method Of Arrival: Ambulatory                                                           iw  

18:38 Acuity: NAY 3                                                                           iw  

                                                                                                  

Triage Assessment:                                                                                

22:20 General: Appears in no apparent distress. well groomed, well developed.                 wr  

                                                                                                  

Historical:                                                                                       

- Allergies:                                                                                      

18:41 No Known Allergies;                                                                     iw  

- Home Meds:                                                                                      

18:42 Buspirone Oral [Active]; Trintellix oral [Active]; hormone [Active];                    iw  

- PMHx:                                                                                           

18:41 Anxiety; cervical/uterine cancer; Depression; Hepatitis; Hypertension;                  iw  

- PSHx:                                                                                           

18:41  section; bunion removed; mass removed from right breast;                       iw  

                                                                                                  

- Immunization history:: Client reports having NOT received the Covid vaccine.                    

- Social history:: Smoking status: Patient denies any tobacco usage or history of.                

- Family history:: not pertinent.                                                                 

                                                                                                  

                                                                                                  

Screenin:39 Abuse screen: Denies threats or abuse. Nutritional screening: no appetite. Tuberculosis lh3 

      screening: No symptoms or risk factors identified. Fall Risk IV access (20 points).         

      Total Crook Fall Scale indicates No Risk (0-24 pts).                                        

                                                                                                  

Assessment:                                                                                       

20:39 General: Appears in no apparent distress. Behavior is calm, cooperative, appropriate    lh3 

      for age. Pain: Denies pain. GI: Abd is soft and non tender X 4 quads. Reports decreased     

      appetite, denies vomiting and nausea. Just not feeling like eating. has only been able      

      to keep down pudding and jello.                                                             

                                                                                                  

Vital Signs:                                                                                      

18:38  / 81; Pulse 121; Resp 18 S; Pulse Ox 97% on R/A; Weight 86.18 kg; Height 5 ft. 7 iw  

      in. (170.18 cm);                                                                            

22:17  / 78; Pulse 94; Resp 20; Temp 98; Pulse Ox 100% ;                                wr  

18:38 Body Mass Index 29.76 (86.18 kg, 170.18 cm)                                             iw  

                                                                                                  

ED Course:                                                                                        

18:23 Patient arrived in ED.                                                                  mr  

18:34 Gilberto Blackwell MD is Attending Physician.                                             harvey 

18:41 Triage completed.                                                                       iw  

18:42 Arm band placed on.                                                                     iw  

19:27 XRAY Chest (1 view) In Process Unspecified.                                             EDMS

19:32 Troponin (emerg Dept Use Only) Sent.                                                    lh3 

19:32 Basic Metabolic Panel Sent.                                                             lh3 

19:32 CBC with Diff Sent.                                                                     lh3 

19:32 LFT's Sent.                                                                             lh3 

19:32 Magnesium Sent.                                                                         lh3 

19:33 NT PRO-BNP Sent.                                                                        lh3 

19:33 PT-INR Sent.                                                                            lh3 

19:33 Inserted saline lock: 20 gauge in left antecubital area, using aseptic technique.       lh3 

20:26 CT Chest For PE Angio In Process Unspecified.                                           EDMS

20:28 Blood Culture Adult (2) Sent.                                                           lh3 

20:38 Micky Nava, JOSÉ is PHCP.                                                           pm1 

20:39 Patient has correct armband on for positive identification. Call light in reach. Door   lh3 

      closed. Verbal reassurance given.                                                           

20:39 No provider procedures requiring assistance completed.                                  lh3 

22:39 Sue Kate, RN is Primary Nurse.                                                     bc5 

23:43 Troponin (Emerg Dept Use Only) Sent.                                                    wr  

                                                                                                  

Administered Medications:                                                                         

20:21 Drug: NS 0.9% 1000 ml Route: IV; Rate: 1 bolus; Site: left antecubital;                 lh3 

20:21 Drug: Pepcid (famotidine) 20 mg Route: IVP; Site: left antecubital;                     [facility] 

20:21 Drug: Rocephin (cefTRIAXone) 1 grams Route: IV; Rate: per protocol; Site: left          3 

      antecubital;                                                                                

21:30 Drug: Zofran (Ondansetron) 4 mg Route: IVP; Site: left antecubital;                       

21:31 Drug: Aspirin Chewable Tablet 162 mg Route: PO;                                         wr  

21:36 Drug: Zithromax (azithromycin) 500 mg Route: IVPB; Infused Over: 1 hrs; Site: left      wr  

      antecubital;                                                                                

22:01 Drug: Lovenox (enoxaparin) 1 mg/kg Route: Sub-Q; Site: right lower abdomen;               

                                                                                                  

                                                                                                  

Outcome:                                                                                          

09/15                                                                                             

00:35 Discharge ordered by MD.                                                                pm1 

02:48 Patient left the ED.                                                                    cc4 

                                                                                                  

Signatures:                                                                                       

Dispatcher [employer]                           EDMS                                                 

Gilberto Blackwell MD MD cha Rivera, Mary                                 mr                                                   

Brianna Bunch, RN                     LARON                                                      

Micky Nava, JOSÉ                    NP   pm1                                                  

Hemalatha Dunham RN RN   [facility]                                                  

Dorcas Whitmore                             4                                                  

Giorgi Klein Bella RN                       RN   bc5                                                  

                                                                                                  

Corrections: (The following items were deleted from the chart)                                    

                                                                                             

19:36 19:32 CORONAVIRUS+MR.LAB.BRZ drawn and sent. [address]

                                                                                                  

**************************************************************************************************

## 2021-09-15 NOTE — EKG
Test Date:    2021-09-14               Test Time:    23:30:29

Technician:   MARSHAL                                    

                                                     

MEASUREMENT RESULTS:                                       

Intervals:                                           

Rate:         88                                     

DC:           162                                    

QRSD:         76                                     

QT:           370                                    

QTc:          447                                    

Axis:                                                

P:            13                                     

DC:           162                                    

QRS:          -3                                     

T:            2                                      

                                                     

INTERPRETIVE STATEMENTS:                                       

                                                     

Normal sinus rhythm

Nonspecific T wave abnormality

Abnormal ECG

Compared to ECG 09/14/2021 19:53:21

Sinus tachycardia no longer present

Possible ischemia no longer present

T-wave abnormality still present



Electronically Signed On 09-15-21 08:14:08 CDT by Aquilino Oden

## 2025-04-02 ENCOUNTER — HOSPITAL ENCOUNTER (OUTPATIENT)
Dept: HOSPITAL 97 - DS | Age: 55
End: 2025-04-02
Attending: INTERNAL MEDICINE
Payer: COMMERCIAL

## 2025-04-02 DIAGNOSIS — R92.8: ICD-10-CM

## 2025-04-02 DIAGNOSIS — N64.89: Primary | ICD-10-CM

## 2025-04-02 PROCEDURE — 77065 DX MAMMO INCL CAD UNI: CPT

## 2025-04-02 PROCEDURE — 88304 TISSUE EXAM BY PATHOLOGIST: CPT

## 2025-04-02 PROCEDURE — 19083 BX BREAST 1ST LESION US IMAG: CPT
